# Patient Record
Sex: MALE | Race: WHITE | NOT HISPANIC OR LATINO | Employment: FULL TIME | URBAN - METROPOLITAN AREA
[De-identification: names, ages, dates, MRNs, and addresses within clinical notes are randomized per-mention and may not be internally consistent; named-entity substitution may affect disease eponyms.]

---

## 2017-01-31 ENCOUNTER — ANTICOAGULATION VISIT (OUTPATIENT)
Dept: VASCULAR LAB | Facility: MEDICAL CENTER | Age: 47
End: 2017-01-31
Attending: NURSE PRACTITIONER
Payer: COMMERCIAL

## 2017-01-31 DIAGNOSIS — D68.51 FACTOR V LEIDEN (HCC): ICD-10-CM

## 2017-01-31 LAB
INR BLD: 3.3 (ref 0.9–1.2)
INR PPP: 3.3 (ref 2–3.5)

## 2017-01-31 PROCEDURE — 99212 OFFICE O/P EST SF 10 MIN: CPT

## 2017-01-31 PROCEDURE — 85610 PROTHROMBIN TIME: CPT

## 2017-01-31 NOTE — MR AVS SNAPSHOT
José Macarioslime   2017 4:15 PM   Anticoagulation Visit   MRN: [id number]    Department:  Vascular Medicine   Dept Phone:  [phone]    Description:  Male : 1970   Provider:  [facility] EXAM 4           Allergies as of 2017     Allergen Noted Reactions    Yellow Dye 2015         You were diagnosed with     Factor V Leiden (CMS-HCC)   [512349]         Vital Signs     Smoking Status                   Never Smoker            Basic Information     Date Of Birth Sex Race Ethnicity Preferred Language    1970 Male White Non- English      Your appointments     2017  4:00 PM   Established Patient with [facility] EXAM 5   [facility]  (--)    [address] [facility] [address] [phone]              Problem List              ICD-10-CM Priority Class Noted - Resolved    Factor V Leiden (CMS-HCC) D68.51   2015 - Present    Chronic anticoagulation Z79.01   2015 - Present    Gastroesophageal reflux disease without esophagitis K21.9   2015 - Present    Type 2 diabetes mellitus without complication (CMS-HCC) E11.9   2015 - Present    Cervicalgia M54.2   2015 - Present    Tinnitus H93.19   2015 - Present      Health Maintenance        Date Due Completion Dates    IMM HEP B VACCINE (1 of 3 - Primary Series) 1970 ---    DIABETES MONOFILAMENT / LE EXAM 3/19/1970 ---    IMM DTaP/Tdap/Td Vaccine (1 - Tdap) 1988 ---    IMM PNEUMOCOCCAL 19-64 (ADULT) MEDIUM RISK SERIES (1 of 1 - PPSV23) 1988 ---    A1C SCREENING 2016, 2016, 10/10/2015    FASTING LIPID PROFILE 2017, 2016, 10/10/2015    URINE ACR / MICROALBUMIN 2017, 10/10/2015    SERUM CREATININE 2017, 2016, 2015, 10/10/2015    RETINAL SCREENING 2017            Results     POCT Protime      Component    INR    3.3                           Current Immunizations     Influenza Vaccine Quad Inj (Preserved) 10/1/2016      Below and/or attached are the medications your provider expects you to take. Review all of your home medications and newly ordered medications with your provider and/or pharmacist. Follow medication instructions as directed by your provider and/or pharmacist. Please keep your medication list with you and share with your provider. Update the information when medications are discontinued, doses are changed, or new medications (including over-the-counter products) are added; and carry medication information at all times in the event of emergency situations     Allergies:  YELLOW DYE - (reactions not documented)               Medications  Valid as of: January 31, 2017 -  4:22 PM    Generic Name Brand Name Tablet Size Instructions for use    Ascorbic Acid (Tab) ascorbic acid 500 MG Take 1,000 mg by mouth every day.        Biotin   Take 10,000 mg by mouth.        MetFORMIN HCl (Tab) GLUCOPHAGE 1000 MG Take 1 Tab by mouth 2 times a day, with meals. Indications: Type 2 Diabetes        Omeprazole (CAPSULE DELAYED RELEASE) PRILOSEC 20 MG Take 1 Cap by mouth every day.        Warfarin Sodium (Tab) COUMADIN 5 MG Take 1 Tab by mouth every day.        Warfarin Sodium (Tab) COUMADIN 5 MG Take 1 Tab by mouth every day.        .                 Medicines prescribed today were sent to:     [facility] [address] [place] [address] (), [place] - [id number] () [place] [address]    Phone: [phone] Fax: [phone]    Open 24 Hours?: No      Medication refill instructions:       If your prescription bottle indicates you have medication refills left, it is not necessary to call your provider’s office. Please contact your pharmacy and they will refill your medication.    If your prescription bottle indicates you do not have any refills left, you may request refills at any time through one of the following ways: The online [employer] system  (except Urgent Care), by calling your provider’s office, or by asking your pharmacy to contact your provider’s office with a refill request. Medication refills are processed only during regular business hours and may not be available until the next business day. Your provider may request additional information or to have a follow-up visit with you prior to refilling your medication.   *Please Note: Medication refills are assigned a new Rx number when refilled electronically. Your pharmacy may indicate that no refills were authorized even though a new prescription for the same medication is available at the pharmacy. Please request the medicine by name with the pharmacy before contacting your provider for a refill.        Warfarin Dosing Calendar   January 2017 Details    Sun Mon Tue Wed Thu Fri Sat     1               2               3               4               5               6               7                 8               9               10               11               12               13               14                 15               16               17               18               19               20               21                 22               23               24               25               26               27               28                 29               30               31   3.3   5 mg   See details           Date Details   01/31 This INR check   INR: 3.3               How to take your warfarin dose     To take:  5 mg Take 1 of the 5 mg tablets.           Warfarin Dosing Calendar   February 2017 Details    Sun Mon Tue Wed Thu Fri Sat        1      7.5 mg         2      7.5 mg         3      5 mg         4      7.5 mg           5      7.5 mg         6      5 mg         7      7.5 mg         8      7.5 mg         9      7.5 mg         10      5 mg         11      7.5 mg           12      7.5 mg         13      5 mg         14      7.5 mg         15               16               17                  18                 19               20               21               22               23               24               25                 26               27               28                    Date Details   No additional details    Date of next INR:  2/14/2017         How to take your warfarin dose     To take:  5 mg Take 1 of the 5 mg tablets.    To take:  7.5 mg Take 1.5 of the 5 mg tablets.              Shareight Access Code: Activation code not generated  Current Shareight Status: Active

## 2017-02-01 NOTE — PROGRESS NOTES
OP Anticoagulation Service Note    Date: 1/31/2017    Anticoagulation Summary as of 1/31/2017     INR goal 2.0-3.0   Selected INR 3.3! (1/31/2017)   Maintenance plan 5 mg (5 mg x 1) on Mon, Fri; 7.5 mg (5 mg x 1.5) all other days   Weekly total 47.5 mg   Plan last modified Lilian Moreno, A.P.N. (10/4/2016)   Next INR check 2/14/2017   Target end date Indefinite    Indications   Pulmonary embolism (CMS-[facility]) (Resolved) [I26.99]  Factor V Leiden (CMS-HCC) [D68.51]         Anticoagulation Episode Summary     [facility] check location [facility]    Preferred lab     Send INR reminders to     Comments       Anticoagulation Care Providers     Provider Role Specialty Phone number    Edmundo Rao M.D. Referring Family Medicine [phone]    [facility] Responsible  [phone]    Lilian Moreno A.P.N. Responsible Cardiology [phone]        Anticoagulation Patient Findings   Negatives Missed Doses, Extra Doses, Medication Changes, Antibiotic Use, Diet Changes, Dental/Other Procedures, Hospitalization, Bleeding Gums, Nose Bleeds, Blood in Urine, Blood in Stool, Any Bruising, Other Complaints          Plan:  INR is high today. Patient has not followed up with our clinic d/t cost but now has insurance which cover our services. Confirmed dosing regimen. No missed or extra doses taken. Patient denies sign/symptoms of bleeding/clotting. No recent medication change. He would like to start eating more green vegetables including juicing with green vegetables and smoothies with kale and spinach. Instructed pt to be consistent. Instructed pt to call clinic with any concerns of bleeding or thrombosis. Instructed pt to take 5 mg tonight only then resume usual regimen. Follow up in 2 weeks        Laura Calles, Pharm D

## 2017-02-14 ENCOUNTER — ANTICOAGULATION VISIT (OUTPATIENT)
Dept: VASCULAR LAB | Facility: MEDICAL CENTER | Age: 47
End: 2017-02-14
Attending: INTERNAL MEDICINE
Payer: COMMERCIAL

## 2017-02-14 DIAGNOSIS — D68.51 FACTOR V LEIDEN (HCC): ICD-10-CM

## 2017-02-14 LAB — INR PPP: 3.5 (ref 2–3.5)

## 2017-02-14 PROCEDURE — 99212 OFFICE O/P EST SF 10 MIN: CPT

## 2017-02-14 PROCEDURE — 85610 PROTHROMBIN TIME: CPT

## 2017-02-14 NOTE — MR AVS SNAPSHOT
José Macarioslime   2017 4:00 PM   Anticoagulation Visit   MRN: [id number]    Department:  Vascular Medicine   Dept Phone:  [phone]    Description:  Male : 1970   Provider:  [facility] EXAM 5           Allergies as of 2017     Allergen Noted Reactions    Yellow Dye 2015         You were diagnosed with     Factor V Leiden (CMS-HCC)   [721373]         Vital Signs     Smoking Status                   Never Smoker            Basic Information     Date Of Birth Sex Race Ethnicity Preferred Language    1970 Male White Non- English      Your appointments     Mar 01, 2017  4:15 PM   Established Patient with [facility] EXAM 1   [facility]  (--)    [address] [facility] [address] [phone]              Problem List              ICD-10-CM Priority Class Noted - Resolved    Factor V Leiden (CMS-HCC) D68.51   2015 - Present    Chronic anticoagulation Z79.01   2015 - Present    Gastroesophageal reflux disease without esophagitis K21.9   2015 - Present    Type 2 diabetes mellitus without complication (CMS-HCC) E11.9   2015 - Present    Cervicalgia M54.2   2015 - Present    Tinnitus H93.19   2015 - Present      Health Maintenance        Date Due Completion Dates    IMM HEP B VACCINE (1 of 3 - Primary Series) 1969 ---    DIABETES MONOFILAMENT / LE EXAM 3/19/1970 ---    IMM DTaP/Tdap/Td Vaccine (1 - Tdap) 1988 ---    IMM PNEUMOCOCCAL 19-64 (ADULT) MEDIUM RISK SERIES (1 of 1 - PPSV23) 1988 ---    A1C SCREENING 2016, 2016, 10/10/2015    FASTING LIPID PROFILE 2017, 2016, 10/10/2015    URINE ACR / MICROALBUMIN 2017, 10/10/2015    SERUM CREATININE 2017, 2016, 2015, 10/10/2015    RETINAL SCREENING 2017            Results     POCT Protime      Component    INR    3.5                           Current Immunizations     Influenza Vaccine Quad Inj (Preserved) 10/1/2016      Below and/or attached are the medications your provider expects you to take. Review all of your home medications and newly ordered medications with your provider and/or pharmacist. Follow medication instructions as directed by your provider and/or pharmacist. Please keep your medication list with you and share with your provider. Update the information when medications are discontinued, doses are changed, or new medications (including over-the-counter products) are added; and carry medication information at all times in the event of emergency situations     Allergies:  YELLOW DYE - (reactions not documented)               Medications  Valid as of: February 14, 2017 -  4:22 PM    Generic Name Brand Name Tablet Size Instructions for use    Ascorbic Acid (Tab) ascorbic acid 500 MG Take 1,000 mg by mouth every day.        Biotin   Take 10,000 mg by mouth.        MetFORMIN HCl (Tab) GLUCOPHAGE 1000 MG Take 1 Tab by mouth 2 times a day, with meals. Indications: Type 2 Diabetes        Omeprazole (CAPSULE DELAYED RELEASE) PRILOSEC 20 MG Take 1 Cap by mouth every day.        Warfarin Sodium (Tab) COUMADIN 5 MG Take 1 Tab by mouth every day.        Warfarin Sodium (Tab) COUMADIN 5 MG Take 1 Tab by mouth every day.        .                 Medicines prescribed today were sent to:     [facility] [address] [place] [address] (), [place] - [id number] () [place] [address]    Phone: [phone] Fax: [phone]    Open 24 Hours?: No      Medication refill instructions:       If your prescription bottle indicates you have medication refills left, it is not necessary to call your provider’s office. Please contact your pharmacy and they will refill your medication.    If your prescription bottle indicates you do not have any refills left, you may request refills at any time through one of the following ways: The online [employer] system  (except Urgent Care), by calling your provider’s office, or by asking your pharmacy to contact your provider’s office with a refill request. Medication refills are processed only during regular business hours and may not be available until the next business day. Your provider may request additional information or to have a follow-up visit with you prior to refilling your medication.   *Please Note: Medication refills are assigned a new Rx number when refilled electronically. Your pharmacy may indicate that no refills were authorized even though a new prescription for the same medication is available at the pharmacy. Please request the medicine by name with the pharmacy before contacting your provider for a refill.        Warfarin Dosing Calendar   February 2017 Details    Sun Mon Tue Wed Thu Fri Sat        1               2               3               4                 5               6               7               8               9               10               11                 12               13               14   3.5   7.5 mg   See details      15      5 mg         16      7.5 mg         17      5 mg         18      7.5 mg           19      7.5 mg         20      5 mg         21      7.5 mg         22      5 mg         23      7.5 mg         24      5 mg         25      7.5 mg           26      7.5 mg         27      5 mg         28      7.5 mg              Date Details   02/14 This INR check   INR: 3.5               How to take your warfarin dose     To take:  5 mg Take 1 of the 5 mg tablets.    To take:  7.5 mg Take 1.5 of the 5 mg tablets.           Warfarin Dosing Calendar   March 2017 Details    Sun Mon Tue Wed Thu Fri Sat        1      5 mg         2      7.5 mg         3      5 mg         4      7.5 mg           5      7.5 mg         6      5 mg         7      7.5 mg         8               9               10               11                 12               13               14               15                  16               17               18                 19               20               21               22               23               24               25                 26               27               28               29               30               31                 Date Details   No additional details    Date of next INR:  3/7/2017         How to take your warfarin dose     To take:  5 mg Take 1 of the 5 mg tablets.    To take:  7.5 mg Take 1.5 of the 5 mg tablets.              [employer] Access Code: Activation code not generated  Current [employer] Status: Active

## 2017-02-15 LAB — INR BLD: 3.5 (ref 0.9–1.2)

## 2017-02-15 NOTE — PROGRESS NOTES
Anticoagulation Summary as of 2/14/2017     INR goal 2.0-3.0   Selected INR 3.5! (2/14/2017)   Maintenance plan 5 mg (5 mg x 1) on Mon, Wed, Fri; 7.5 mg (5 mg x 1.5) all other days   Weekly total 45 mg   Plan last modified Jori Pérez, PHARMD (2/14/2017)   Next INR check 3/7/2017   Target end date Indefinite    Indications   Pulmonary embolism (CMS-[facility]) (Resolved) [I26.99]  Factor V Leiden (CMS-[facility]) [D68.51]         Anticoagulation Episode Summary     [facility]R check location [facility]    Preferred lab     Send INR reminders to     Comments       Anticoagulation Care Providers     Provider Role Specialty Phone number    Edmundo Rao M.D. Referring Family Medicine [phone]    [facility] Responsible  [phone]    TITO Ash Responsible Cardiology [phone]        Anticoagulation Patient Findings      José Bray seen in clinic today  INR  supra-therapeutic.    Denies signs/symptoms of bleeding and/or thrombosis.    Denies changes to diet or medications.   Follow up appointment in 2 week(s).    Decrease weekly warfarin dose as noted    Jori Pérez, PHARMD

## 2017-02-28 ENCOUNTER — OFFICE VISIT (OUTPATIENT)
Dept: MEDICAL GROUP | Facility: MEDICAL CENTER | Age: 47
End: 2017-02-28
Payer: COMMERCIAL

## 2017-02-28 VITALS
TEMPERATURE: 98.9 F | WEIGHT: 224 LBS | RESPIRATION RATE: 16 BRPM | HEIGHT: 74 IN | SYSTOLIC BLOOD PRESSURE: 122 MMHG | HEART RATE: 77 BPM | BODY MASS INDEX: 28.75 KG/M2 | OXYGEN SATURATION: 94 % | DIASTOLIC BLOOD PRESSURE: 82 MMHG

## 2017-02-28 DIAGNOSIS — R10.9 ABDOMINAL CRAMPING: ICD-10-CM

## 2017-02-28 DIAGNOSIS — R14.0 ABDOMINAL BLOATING: ICD-10-CM

## 2017-02-28 DIAGNOSIS — H93.13 TINNITUS, BILATERAL: ICD-10-CM

## 2017-02-28 PROCEDURE — 99213 OFFICE O/P EST LOW 20 MIN: CPT | Performed by: PHYSICIAN ASSISTANT

## 2017-02-28 NOTE — MR AVS SNAPSHOT
"        José Bray   2017 3:40 PM   Office Visit   MRN: [id number]    Department:  [facility]   Dept Phone:  [phone]    Description:  Male : 1970   Provider:  Hetal De La Cruz PA-C           Reason for Visit     Otalgia Tenitis - feels pressure and then release constantly, constant ringing    GI Problem When eating/digesting feels pain in mid torso and rectal pain, some pain when having bowel movements      Allergies as of 2017     Allergen Noted Reactions    Yellow Dye 2015         You were diagnosed with     Tinnitus, bilateral   [477162]       Abdominal bloating   [023815]       Abdominal cramping   [908349]         Vital Signs     Blood Pressure Pulse Temperature Respirations Height Weight    122/82 mmHg 77 37.2 °C (98.9 °F) 16 1.88 m (6' 2\") 101.606 kg (224 lb)    Body Mass Index Oxygen Saturation Smoking Status             28.75 kg/m2 94% Never Smoker          Basic Information     Date Of Birth Sex Race Ethnicity Preferred Language    1970 Male White Non- English      Your appointments     Mar 01, 2017  4:15 PM   Established Patient with [facility] EXAM 1   [facility]  (--)    [address] [place] [address]   [phone]              Problem List              ICD-10-CM Priority Class Noted - Resolved    Factor V Leiden (CMS-HCC) D68.51   2015 - Present    Chronic anticoagulation Z79.01   2015 - Present    Gastroesophageal reflux disease without esophagitis K21.9   2015 - Present    Type 2 diabetes mellitus without complication (CMS-HCC) E11.9   2015 - Present    Cervicalgia M54.2   2015 - Present    Tinnitus H93.19   2015 - Present      Health Maintenance        Date Due Completion Dates    IMM HEP B VACCINE (1 of 3 - Primary Series) 1970 ---    DIABETES MONOFILAMENT / LE EXAM 3/19/1970 ---    IMM DTaP/Tdap/Td Vaccine (1 - Tdap) 1988 ---    IMM PNEUMOCOCCAL " 19-64 (ADULT) MEDIUM RISK SERIES (1 of 1 - PPSV23) 9/19/1988 ---    A1C SCREENING 5/28/2017 11/28/2016, 5/13/2016, 10/10/2015    FASTING LIPID PROFILE 11/28/2017 11/28/2016, 5/13/2016, 10/10/2015    URINE ACR / MICROALBUMIN 11/28/2017 11/28/2016, 10/10/2015    SERUM CREATININE 11/28/2017 11/28/2016, 5/13/2016, 12/14/2015, 10/10/2015    RETINAL SCREENING 12/6/2017 12/6/2016            Current Immunizations     Influenza Vaccine Quad Inj (Preserved) 10/1/2016      Below and/or attached are the medications your provider expects you to take. Review all of your home medications and newly ordered medications with your provider and/or pharmacist. Follow medication instructions as directed by your provider and/or pharmacist. Please keep your medication list with you and share with your provider. Update the information when medications are discontinued, doses are changed, or new medications (including over-the-counter products) are added; and carry medication information at all times in the event of emergency situations     Allergies:  YELLOW DYE - (reactions not documented)               Medications  Valid as of: February 28, 2017 -  4:35 PM    Generic Name Brand Name Tablet Size Instructions for use    Ascorbic Acid (Tab) ascorbic acid 500 MG Take 1,000 mg by mouth every day.        B Complex Vitamins   Take  by mouth.        Biotin   Take 10,000 mg by mouth.        MetFORMIN HCl (Tab) GLUCOPHAGE 1000 MG Take 1 Tab by mouth 2 times a day, with meals. Indications: Type 2 Diabetes        Omeprazole (CAPSULE DELAYED RELEASE) PRILOSEC 20 MG Take 1 Cap by mouth every day.        Warfarin Sodium (Tab) COUMADIN 5 MG Take 1 Tab by mouth every day.        Warfarin Sodium (Tab) COUMADIN 5 MG Take 1 Tab by mouth every day.        .                 Medicines prescribed today were sent to:     [facility] [place] (), [place] - [address]    [address] [place] () [place] [address]    Phone: [phone] Fax:  [phone]    Open 24 Hours?: No      Medication refill instructions:       If your prescription bottle indicates you have medication refills left, it is not necessary to call your provider’s office. Please contact your pharmacy and they will refill your medication.    If your prescription bottle indicates you do not have any refills left, you may request refills at any time through one of the following ways: The online [employer] system (except Urgent Care), by calling your provider’s office, or by asking your pharmacy to contact your provider’s office with a refill request. Medication refills are processed only during regular business hours and may not be available until the next business day. Your provider may request additional information or to have a follow-up visit with you prior to refilling your medication.   *Please Note: Medication refills are assigned a new Rx number when refilled electronically. Your pharmacy may indicate that no refills were authorized even though a new prescription for the same medication is available at the pharmacy. Please request the medicine by name with the pharmacy before contacting your provider for a refill.        Referral     A referral request has been sent to our patient care coordination department. Please allow 3-5 business days for us to process this request and contact you either by phone or mail. If you do not hear from us by the 5th business day, please call us at [phone].           SubtleData Access Code: Activation code not generated  Current SubtleData Status: Active

## 2017-03-01 ENCOUNTER — ANTICOAGULATION VISIT (OUTPATIENT)
Dept: VASCULAR LAB | Facility: MEDICAL CENTER | Age: 47
End: 2017-03-01
Attending: INTERNAL MEDICINE
Payer: COMMERCIAL

## 2017-03-01 DIAGNOSIS — D68.51 FACTOR V LEIDEN (HCC): ICD-10-CM

## 2017-03-01 PROBLEM — R14.0 ABDOMINAL BLOATING: Status: ACTIVE | Noted: 2017-03-01

## 2017-03-01 LAB
INR BLD: 2.4 (ref 0.9–1.2)
INR PPP: 2.4 (ref 2–3.5)

## 2017-03-01 PROCEDURE — 99211 OFF/OP EST MAY X REQ PHY/QHP: CPT

## 2017-03-01 PROCEDURE — 85610 PROTHROMBIN TIME: CPT

## 2017-03-01 NOTE — PROGRESS NOTES
Subjective:   José Bray is a 46 y.o. male here today for discussion of tinnitus and abdominal bloating    Tinnitus  Chronic tinnitus bilat, usually low frequency, sometimes high frequency. For the past few months has been having more pressure in ears and now has pain that comes and goes. No dizziness or headache. No vision change. No noticeable hearing loss. Initially diagnosed with Darius Zurita. Interested in having new evaluation.    Abdominal bloating  Chronic but worsening problems with abdominal bloating, gassiness and cramping. Especially associated with eating. Feels that he can feel food going through digestive tract and especially in the LUQ and then LLQ he will have pain. Pain is better after bowel movement. Has daily or almost daily bowel movements. No blood or change in caliber of stool. Has hx of GERD and PUD. Had upper endoscopy in 2012 that showed TRISTEN and colonoscopy in 2011 that was normal. These [place] were [place]. He is under stress and has some level of anxiety and realizes symptoms could be related to this but would also like evaluation to make sure nothing else is wrong with his GI system.         Current medicines (including changes today)  Current Outpatient Prescriptions   Medication Sig Dispense Refill   • B Complex Vitamins (B COMPLEX PO) Take  by mouth.     • metformin (GLUCOPHAGE) 1000 MG tablet Take 1 Tab by mouth 2 times a day, with meals. Indications: Type 2 Diabetes 180 Tab 3   • warfarin (COUMADIN) 5 MG Tab Take 1 Tab by mouth every day. 50 Tab 5   • BIOTIN PO Take 10,000 mg by mouth.     • warfarin (COUMADIN) 5 MG Tab Take 1 Tab by mouth every day. 150 Tab 3   • ascorbic acid (ASCORBIC ACID) 500 MG Tab Take 1,000 mg by mouth every day.     • omeprazole (PRILOSEC) 20 MG delayed-release capsule Take 1 Cap by mouth every day. 90 Cap 3     No current facility-administered medications for this visit.     He  has no past medical history on file.    ROS   No fever/chills. No weight  "change. No headache/dizziness. No focal weakness. No sore throat, nasal congestion, ear pain. No chest pain, no shortness of breath, difficulty breathing. No urinary complaint. No rash or skin lesion. No joint pain or swelling.       Objective:     Blood pressure 122/82, pulse 77, temperature 37.2 °C (98.9 °F), resp. rate 16, height 1.88 m (6' 2\"), weight 101.606 kg (224 lb), SpO2 94 %. Body mass index is 28.75 kg/(m^2).   Physical Exam:  Constitutional: WDWN, NAD  Skin: Warm, dry, good turgor, no rashes in visible areas.  ENT: bilat canals cleared, TMs normal appearance  Respiratory: Unlabored respiratory effort, lungs clear to auscultation, no wheezes, no ronchi.  Cardiovascular: Normal S1, S2, no murmur,   Abdomen: Soft, non-tender, no masses, no hepatosplenomegaly.  Psych: Alert and oriented x3, normal affect and mood.        Assessment and Plan:   The following treatment plan was discussed    1. Tinnitus, bilateral    - REFERRAL TO ENT    2. Abdominal bloating    - REFERRAL TO GASTROENTEROLOGY    3. Abdominal cramping    - REFERRAL TO GASTROENTEROLOGY      Followup: with Dr. Rao as scheduled           "

## 2017-03-01 NOTE — ASSESSMENT & PLAN NOTE
Chronic tinnitus bilat, usually low frequency, sometimes high frequency. For the past few months has been having more pressure in ears and now has pain that comes and goes. No dizziness or headache. No vision change. No noticeable hearing loss. Initially diagnosed with Darius Zurita. Interested in having new evaluation.

## 2017-03-01 NOTE — ASSESSMENT & PLAN NOTE
Chronic but worsening problems with abdominal bloating, gassiness and cramping. Especially associated with eating. Feels that he can feel food going through digestive tract and especially in the LUQ and then LLQ he will have pain. Pain is better after bowel movement. Has daily or almost daily bowel movements. No blood or change in caliber of stool. Has hx of GERD and PUD. Had upper endoscopy in 2012 that showed TRISTEN and colonoscopy in 2011 that was normal. These [place] were [place]. He is under stress and has some level of anxiety and realizes symptoms could be related to this but would also like evaluation to make sure nothing else is wrong with his GI system.

## 2017-03-01 NOTE — MR AVS SNAPSHOT
José Katarina   3/1/2017 4:15 PM   Anticoagulation Visit   MRN: [id number]    Department:  Vascular Medicine   Dept Phone:  [phone]    Description:  Male : 1970   Provider:  [facility] EXAM 1           Allergies as of 3/1/2017     Allergen Noted Reactions    Yellow Dye 2015         You were diagnosed with     Factor V Leiden (CMS-HCC)   [500218]         Vital Signs     Smoking Status                   Never Smoker            Basic Information     Date Of Birth Sex Race Ethnicity Preferred Language    1970 Male White Non- English      Your appointments     Mar 15, 2017  4:15 PM   Established Patient with [facility] EXAM 5   [facility]  (--)    [address] [facility] [address] [phone]              Problem List              ICD-10-CM Priority Class Noted - Resolved    Factor V Leiden (CMS-HCC) D68.51   2015 - Present    Chronic anticoagulation Z79.01   2015 - Present    Gastroesophageal reflux disease without esophagitis K21.9   2015 - Present    Type 2 diabetes mellitus without complication (CMS-HCC) E11.9   2015 - Present    Cervicalgia M54.2   2015 - Present    Tinnitus H93.19   2015 - Present    Abdominal bloating R14.0   3/1/2017 - Present      Health Maintenance        Date Due Completion Dates    IMM HEP B VACCINE (1 of 3 - Primary Series) 1970 ---    DIABETES MONOFILAMENT / LE EXAM 3/19/1970 ---    IMM DTaP/Tdap/Td Vaccine (1 - Tdap) 1988 ---    IMM PNEUMOCOCCAL 19-64 (ADULT) MEDIUM RISK SERIES (1 of 1 - PPSV23) 1988 ---    A1C SCREENING 2016, 2016, 10/10/2015    FASTING LIPID PROFILE 2017, 2016, 10/10/2015    URINE ACR / MICROALBUMIN 2017, 10/10/2015    SERUM CREATININE 2017, 2016, 2015, 10/10/2015    RETINAL SCREENING 2017            Results     POCT Protime     Component    INR    2.4                        Current Immunizations     Influenza Vaccine Quad Inj (Preserved) 10/1/2016      Below and/or attached are the medications your provider expects you to take. Review all of your home medications and newly ordered medications with your provider and/or pharmacist. Follow medication instructions as directed by your provider and/or pharmacist. Please keep your medication list with you and share with your provider. Update the information when medications are discontinued, doses are changed, or new medications (including over-the-counter products) are added; and carry medication information at all times in the event of emergency situations     Allergies:  YELLOW DYE - (reactions not documented)               Medications  Valid as of: March 01, 2017 -  4:30 PM    Generic Name Brand Name Tablet Size Instructions for use    Ascorbic Acid (Tab) ascorbic acid 500 MG Take 1,000 mg by mouth every day.        B Complex Vitamins   Take  by mouth.        Biotin   Take 10,000 mg by mouth.        MetFORMIN HCl (Tab) GLUCOPHAGE 1000 MG Take 1 Tab by mouth 2 times a day, with meals. Indications: Type 2 Diabetes        Omeprazole (CAPSULE DELAYED RELEASE) PRILOSEC 20 MG Take 1 Cap by mouth every day.        Warfarin Sodium (Tab) COUMADIN 5 MG Take 1 Tab by mouth every day.        Warfarin Sodium (Tab) COUMADIN 5 MG Take 1 Tab by mouth every day.        .                 Medicines prescribed today were sent to:     [facility] [address] [place] [address] (), [place] - [address]    [address] () [place] [address]    Phone: [phone] Fax: [phone]    Open 24 Hours?: No      Medication refill instructions:       If your prescription bottle indicates you have medication refills left, it is not necessary to call your provider’s office. Please contact your pharmacy and they will refill your medication.    If your prescription bottle indicates you do not have any refills left, you may  request refills at any time through one of the following ways: The online [employer] system (except Urgent Care), by calling your provider’s office, or by asking your pharmacy to contact your provider’s office with a refill request. Medication refills are processed only during regular business hours and may not be available until the next business day. Your provider may request additional information or to have a follow-up visit with you prior to refilling your medication.   *Please Note: Medication refills are assigned a new Rx number when refilled electronically. Your pharmacy may indicate that no refills were authorized even though a new prescription for the same medication is available at the pharmacy. Please request the medicine by name with the pharmacy before contacting your provider for a refill.        Warfarin Dosing Calendar   March 2017 Details    Sun Mon Tue Wed Thu Fri Sat        1   2.4   5 mg   See details      2      7.5 mg         3      5 mg         4      7.5 mg           5      7.5 mg         6      5 mg         7      7.5 mg         8      5 mg         9      7.5 mg         10      5 mg         11      7.5 mg           12      7.5 mg         13      5 mg         14      7.5 mg         15      5 mg         16               17               18                 19               20               21               22               23               24               25                 26               27               28               29               30               31                 Date Details   03/01 This INR check   INR: 2.4       Date of next INR:  3/15/2017         How to take your warfarin dose     To take:  5 mg Take 1 of the 5 mg tablets.    To take:  7.5 mg Take 1.5 of the 5 mg tablets.              [employer] Access Code: Activation code not generated  Current [employer] Status: Active

## 2017-03-02 NOTE — PROGRESS NOTES
Anticoagulation Summary as of 3/1/2017     INR goal 2.0-3.0   Selected INR 2.4 (3/1/2017)   Maintenance plan 5 mg (5 mg x 1) on Mon, Wed, Fri; 7.5 mg (5 mg x 1.5) all other days   Weekly total 45 mg   Plan last modified Jori Pérez, PHARMD (2/14/2017)   Next INR check 3/15/2017   Target end date Indefinite    Indications   Pulmonary embolism (CMS-[facility]) (Resolved) [I26.99]  Factor V Leiden (CMS-[facility]) [D68.51]         Anticoagulation Episode Summary     [facility]R check location [facility]    Preferred lab     Send INR reminders to     Comments       Anticoagulation Care Providers     Provider Role Specialty Phone number    Edmundo Rao M.D. Referring Family Medicine [phone]    [facility] Responsible  [phone]    [facility] Responsible Cardiology [phone]        Anticoagulation Patient Findings    INR is therapeutic today.Verified dosing, no missed doses. Denies any changes in medications, patient has increased eating vitamin K containing foods for at least 1 week. Denies s/s of bleeding or coagulation. Follow up in 2 weeks.    Randall Gray, STUDENT    Paul Jackson, PHARMD

## 2017-03-15 ENCOUNTER — ANTICOAGULATION VISIT (OUTPATIENT)
Dept: VASCULAR LAB | Facility: MEDICAL CENTER | Age: 47
End: 2017-03-15
Attending: INTERNAL MEDICINE
Payer: COMMERCIAL

## 2017-03-15 DIAGNOSIS — D68.51 FACTOR V LEIDEN (HCC): ICD-10-CM

## 2017-03-15 LAB — INR PPP: 1.8 (ref 2–3.5)

## 2017-03-15 PROCEDURE — 99212 OFFICE O/P EST SF 10 MIN: CPT | Performed by: NURSE PRACTITIONER

## 2017-03-15 PROCEDURE — 85610 PROTHROMBIN TIME: CPT

## 2017-03-15 NOTE — PROGRESS NOTES
Anticoagulation Summary as of 3/15/2017     INR goal 2.0-3.0   Selected INR 1.8! (3/15/2017)   Maintenance plan 5 mg (5 mg x 1) on Mon, Wed, Fri; 7.5 mg (5 mg x 1.5) all other days   Weekly total 45 mg   Plan last modified Jori Pérez, PHARMD (2/14/2017)   Next INR check 4/5/2017   Target end date Indefinite    Indications   Pulmonary embolism (CMS-[facility]) (Resolved) [I26.99]  Factor V Leiden (CMS-[facility]) [D68.51]         Anticoagulation Episode Summary     [facility]R check location [facility]    Preferred lab     Send INR reminders to     Comments       Anticoagulation Care Providers     Provider Role Specialty Phone number    Edmundo Rao M.D. Referring Family Medicine [phone]    [facility] Responsible  [phone]    [facility] Ash Responsible Cardiology [phone]        Anticoagulation Patient Findings    Pt is subtherapeutic today. Denies any changes in medications.  Pt has increased his green vegetable intake with green smoothies.  He is not sure if he will be continuing this so will wait for next appt to increase his dose. Med rec completed and reviewed. Patient denies any s/sx of bleeding or clotting. Will increase tonight's dose to 7.5mg then continue dosing as outlined in calendar. Will follow-up with pt in 4 weeks.    Destiney VELIZ

## 2017-03-15 NOTE — MR AVS SNAPSHOT
José Katarina   3/15/2017 4:15 PM   Anticoagulation Visit   MRN: [id number]    Department:  Vascular Medicine   Dept Phone:  [phone]    Description:  Male : 1970   Provider:  [facility] EXAM 5           Allergies as of 3/15/2017     Allergen Noted Reactions    Yellow Dye 2015         You were diagnosed with     Factor V Leiden (CMS-HCC)   [751874]         Vital Signs     Smoking Status                   Never Smoker            Basic Information     Date Of Birth Sex Race Ethnicity Preferred Language    1970 Male White Non- English      Your appointments     2017  4:15 PM   Established Patient with [facility] EXAM 5   [facility]  (--)    [address] [facility] [address] [phone]              Problem List              ICD-10-CM Priority Class Noted - Resolved    Factor V Leiden (CMS-HCC) D68.51   2015 - Present    Chronic anticoagulation Z79.01   2015 - Present    Gastroesophageal reflux disease without esophagitis K21.9   2015 - Present    Type 2 diabetes mellitus without complication (CMS-HCC) E11.9   2015 - Present    Cervicalgia M54.2   2015 - Present    Tinnitus H93.19   2015 - Present    Abdominal bloating R14.0   3/1/2017 - Present      Health Maintenance        Date Due Completion Dates    IMM HEP B VACCINE (1 of 3 - Primary Series) 1969 ---    DIABETES MONOFILAMENT / LE EXAM 3/19/1970 ---    IMM DTaP/Tdap/Td Vaccine (1 - Tdap) 1988 ---    IMM PNEUMOCOCCAL 19-64 (ADULT) MEDIUM RISK SERIES (1 of 1 - PPSV23) 1988 ---    A1C SCREENING 2016, 2016, 10/10/2015    FASTING LIPID PROFILE 2017, 2016, 10/10/2015    URINE ACR / MICROALBUMIN 2017, 10/10/2015    SERUM CREATININE 2017, 2016, 2015, 10/10/2015    RETINAL SCREENING 2017            Results     POCT Protime     Component    INR    1.8                        Current Immunizations     Influenza Vaccine Quad Inj (Preserved) 10/1/2016      Below and/or attached are the medications your provider expects you to take. Review all of your home medications and newly ordered medications with your provider and/or pharmacist. Follow medication instructions as directed by your provider and/or pharmacist. Please keep your medication list with you and share with your provider. Update the information when medications are discontinued, doses are changed, or new medications (including over-the-counter products) are added; and carry medication information at all times in the event of emergency situations     Allergies:  YELLOW DYE - (reactions not documented)               Medications  Valid as of: March 15, 2017 -  4:25 PM    Generic Name Brand Name Tablet Size Instructions for use    Ascorbic Acid (Tab) ascorbic acid 500 MG Take 1,000 mg by mouth every day.        B Complex Vitamins   Take  by mouth.        Biotin   Take 10,000 mg by mouth.        MetFORMIN HCl (Tab) GLUCOPHAGE 1000 MG Take 1 Tab by mouth 2 times a day, with meals. Indications: Type 2 Diabetes        Omeprazole (CAPSULE DELAYED RELEASE) PRILOSEC 20 MG Take 1 Cap by mouth every day.        Warfarin Sodium (Tab) COUMADIN 5 MG Take 1 Tab by mouth every day.        Warfarin Sodium (Tab) COUMADIN 5 MG Take 1 Tab by mouth every day.        .                 Medicines prescribed today were sent to:     [facility] [address] [place] [address] (), [place] - [address]    [address] () [place] [address]    Phone: [phone] Fax: [phone]    Open 24 Hours?: No      Medication refill instructions:       If your prescription bottle indicates you have medication refills left, it is not necessary to call your provider’s office. Please contact your pharmacy and they will refill your medication.    If your prescription bottle indicates you do not have any refills left, you may  request refills at any time through one of the following ways: The online [employer] system (except Urgent Care), by calling your provider’s office, or by asking your pharmacy to contact your provider’s office with a refill request. Medication refills are processed only during regular business hours and may not be available until the next business day. Your provider may request additional information or to have a follow-up visit with you prior to refilling your medication.   *Please Note: Medication refills are assigned a new Rx number when refilled electronically. Your pharmacy may indicate that no refills were authorized even though a new prescription for the same medication is available at the pharmacy. Please request the medicine by name with the pharmacy before contacting your provider for a refill.        Warfarin Dosing Calendar   March 2017 Details    Sun Mon Tue Wed Thu Fri Sat        1               2               3               4                 5               6               7               8               9               10               11                 12               13               14               15   1.8   7.5 mg   See details      16      7.5 mg         17      5 mg         18      7.5 mg           19      7.5 mg         20      5 mg         21      7.5 mg         22      5 mg         23      7.5 mg         24      5 mg         25      7.5 mg           26      7.5 mg         27      5 mg         28      7.5 mg         29      5 mg         30      7.5 mg         31      5 mg           Date Details   03/15 This INR check   INR: 1.8               How to take your warfarin dose     To take:  5 mg Take 1 of the 5 mg tablets.    To take:  7.5 mg Take 1.5 of the 5 mg tablets.           Warfarin Dosing Calendar   April 2017 Details    Sun Mon Tue Wed Thu Fri Sat           1      7.5 mg           2      7.5 mg         3      5 mg         4      7.5 mg         5      5 mg         6               7                 8                 9               10               11               12               13               14               15                 16               17               18               19               20               21               22                 23               24               25               26               27               28               29                 30                      Date Details   No additional details    Date of next INR:  4/5/2017         How to take your warfarin dose     To take:  5 mg Take 1 of the 5 mg tablets.    To take:  7.5 mg Take 1.5 of the 5 mg tablets.              [employer] Access Code: Activation code not generated  Current [employer] Status: Active

## 2017-04-05 ENCOUNTER — ANTICOAGULATION VISIT (OUTPATIENT)
Dept: VASCULAR LAB | Facility: MEDICAL CENTER | Age: 47
End: 2017-04-05
Attending: INTERNAL MEDICINE
Payer: COMMERCIAL

## 2017-04-05 DIAGNOSIS — D68.51 FACTOR V LEIDEN (HCC): ICD-10-CM

## 2017-04-05 LAB — INR PPP: 2.2 (ref 2–3.5)

## 2017-04-05 PROCEDURE — 99211 OFF/OP EST MAY X REQ PHY/QHP: CPT | Performed by: NURSE PRACTITIONER

## 2017-04-05 PROCEDURE — 85610 PROTHROMBIN TIME: CPT

## 2017-04-05 NOTE — MR AVS SNAPSHOT
José Katarina   2017 4:15 PM   Anticoagulation Visit   MRN: [id number]    Department:  Vascular Medicine   Dept Phone:  [phone]    Description:  Male : 1970   Provider:  [facility] EXAM 5           Allergies as of 2017     Allergen Noted Reactions    Yellow Dye 2015         You were diagnosed with     Factor V Leiden (CMS-HCC)   [314053]         Vital Signs     Smoking Status                   Never Smoker            Basic Information     Date Of Birth Sex Race Ethnicity Preferred Language    1970 Male White Non- English      Your appointments     May 03, 2017  4:15 PM   Established Patient with [facility] EXAM 4   [facility]  (--)    [address] [facility] [address] [phone]              Problem List              ICD-10-CM Priority Class Noted - Resolved    Factor V Leiden (CMS-HCC) D68.51   2015 - Present    Chronic anticoagulation Z79.01   2015 - Present    Gastroesophageal reflux disease without esophagitis K21.9   2015 - Present    Type 2 diabetes mellitus without complication (CMS-HCC) E11.9   2015 - Present    Cervicalgia M54.2   2015 - Present    Tinnitus H93.19   2015 - Present    Abdominal bloating R14.0   3/1/2017 - Present      Health Maintenance        Date Due Completion Dates    IMM HEP B VACCINE (1 of 3 - Primary Series) 1970 ---    DIABETES MONOFILAMENT / LE EXAM 3/19/1970 ---    IMM DTaP/Tdap/Td Vaccine (1 - Tdap) 1988 ---    IMM PNEUMOCOCCAL 19-64 (ADULT) MEDIUM RISK SERIES (1 of 1 - PPSV23) 1988 ---    A1C SCREENING 2016, 2016, 10/10/2015    FASTING LIPID PROFILE 2017, 2016, 10/10/2015    URINE ACR / MICROALBUMIN 2017, 10/10/2015    SERUM CREATININE 2017, 2016, 2015, 10/10/2015    RETINAL SCREENING 2017            Results     POCT Protime     Component    INR    2.2                        Current Immunizations     Influenza Vaccine Quad Inj (Preserved) 10/1/2016      Below and/or attached are the medications your provider expects you to take. Review all of your home medications and newly ordered medications with your provider and/or pharmacist. Follow medication instructions as directed by your provider and/or pharmacist. Please keep your medication list with you and share with your provider. Update the information when medications are discontinued, doses are changed, or new medications (including over-the-counter products) are added; and carry medication information at all times in the event of emergency situations     Allergies:  YELLOW DYE - (reactions not documented)               Medications  Valid as of: April 05, 2017 -  4:21 PM    Generic Name Brand Name Tablet Size Instructions for use    Ascorbic Acid (Tab) ascorbic acid 500 MG Take 1,000 mg by mouth every day.        B Complex Vitamins   Take  by mouth.        Biotin   Take 10,000 mg by mouth.        MetFORMIN HCl (Tab) GLUCOPHAGE 1000 MG Take 1 Tab by mouth 2 times a day, with meals. Indications: Type 2 Diabetes        Omeprazole (CAPSULE DELAYED RELEASE) PRILOSEC 20 MG Take 1 Cap by mouth every day.        Warfarin Sodium (Tab) COUMADIN 5 MG Take 1 Tab by mouth every day.        Warfarin Sodium (Tab) COUMADIN 5 MG Take 1 Tab by mouth every day.        .                 Medicines prescribed today were sent to:     [facility] [address] [place] [address] (), [place] - [address]    [address] () [place] [address]    Phone: [phone] Fax: [phone]    Open 24 Hours?: No      Medication refill instructions:       If your prescription bottle indicates you have medication refills left, it is not necessary to call your provider’s office. Please contact your pharmacy and they will refill your medication.    If your prescription bottle indicates you do not have any refills left, you may  request refills at any time through one of the following ways: The online Com[employer] Line system (except Urgent Care), by calling your provider’s office, or by asking your pharmacy to contact your provider’s office with a refill request. Medication refills are processed only during regular business hours and may not be available until the next business day. Your provider may request additional information or to have a follow-up visit with you prior to refilling your medication.   *Please Note: Medication refills are assigned a new Rx number when refilled electronically. Your pharmacy may indicate that no refills were authorized even though a new prescription for the same medication is available at the pharmacy. Please request the medicine by name with the pharmacy before contacting your provider for a refill.        Warfarin Dosing Calendar   April 2017 Details    Sun Mon Tue Wed Thu Fri Sat           1                 2               3               4               5   2.2   5 mg   See details      6      7.5 mg         7      5 mg         8      7.5 mg           9      7.5 mg         10      5 mg         11      7.5 mg         12      5 mg         13      7.5 mg         14      5 mg         15      7.5 mg           16      7.5 mg         17      5 mg         18      7.5 mg         19      5 mg         20      7.5 mg         21      5 mg         22      7.5 mg           23      7.5 mg         24      5 mg         25      7.5 mg         26      5 mg         27      7.5 mg         28      5 mg         29      7.5 mg           30      7.5 mg                Date Details   04/05 This INR check   INR: 2.2               How to take your warfarin dose     To take:  5 mg Take 1 of the 5 mg tablets.    To take:  7.5 mg Take 1.5 of the 5 mg tablets.           Warfarin Dosing Calendar   May 2017 Details    Sun Mon Tue Wed Thu Fri Sat      1      5 mg         2      7.5 mg         3      5 mg         4               5               6                    7               8               9               10               11               12               13                 14               15               16               17               18               19               20                 21               22               23               24               25               26               27                 28               29               30               31                   Date Details   No additional details    Date of next INR:  5/3/2017         How to take your warfarin dose     To take:  5 mg Take 1 of the 5 mg tablets.    To take:  7.5 mg Take 1.5 of the 5 mg tablets.              Embedly Access Code: Activation code not generated  Current Embedly Status: Active

## 2017-04-05 NOTE — PROGRESS NOTES
Anticoagulation Summary as of 4/5/2017     INR goal 2.0-3.0   Selected INR 2.2 (4/5/2017)   Maintenance plan 5 mg (5 mg x 1) on Mon, Wed, Fri; 7.5 mg (5 mg x 1.5) all other days   Weekly total 45 mg   Plan last modified Jori Préez, PHARMD (2/14/2017)   Next INR check 5/3/2017   Target end date Indefinite    Indications   Pulmonary embolism (CMS-[facility]) (Resolved) [I26.99]  Factor V Leiden (CMS-[facility]) [D68.51]         Anticoagulation Episode Summary     [facility]R check location [facility]    Preferred lab     Send INR reminders to     Comments       Anticoagulation Care Providers     Provider Role Specialty Phone number    Edmundo Rao M.D. Referring Family Medicine [phone]    [facility] Responsible  [phone]    TITO Ash Responsible Cardiology [phone]        Patient is therapeutic today. Denies any medication or diet changes. No current symptoms of bleeding or thrombosis reported. Continue current regimen. Follow up in 4 weeks.    Next Appointment: Wednesday, May 3, 2017 at 4:15 pm.    Leatha VELIZ

## 2017-04-07 LAB — INR BLD: 2.2 (ref 0.9–1.2)

## 2017-05-04 ENCOUNTER — PATIENT MESSAGE (OUTPATIENT)
Dept: MEDICAL GROUP | Facility: CLINIC | Age: 47
End: 2017-05-04

## 2017-05-04 DIAGNOSIS — E11.9 TYPE 2 DIABETES MELLITUS WITHOUT COMPLICATION, UNSPECIFIED LONG TERM INSULIN USE STATUS: ICD-10-CM

## 2017-05-09 ENCOUNTER — ANTICOAGULATION VISIT (OUTPATIENT)
Dept: VASCULAR LAB | Facility: MEDICAL CENTER | Age: 47
End: 2017-05-09
Attending: INTERNAL MEDICINE
Payer: COMMERCIAL

## 2017-05-09 DIAGNOSIS — D68.51 FACTOR V LEIDEN (HCC): ICD-10-CM

## 2017-05-09 LAB — INR PPP: 1.7 (ref 2–3.5)

## 2017-05-09 PROCEDURE — 99212 OFFICE O/P EST SF 10 MIN: CPT

## 2017-05-09 PROCEDURE — 85610 PROTHROMBIN TIME: CPT

## 2017-05-09 NOTE — PROGRESS NOTES
Anticoagulation Summary as of 5/9/2017     INR goal 2.0-3.0   Selected INR 1.7! (5/9/2017)   Maintenance plan 5 mg (5 mg x 1) on Mon, Wed, Fri; 7.5 mg (5 mg x 1.5) all other days   Weekly total 45 mg   Plan last modified Jori Pérez, PHARMD (2/14/2017)   Next INR check 5/23/2017   Target end date Indefinite    Indications   Pulmonary embolism (CMS-[facility]) (Resolved) [I26.99]  Factor V Leiden (CMS-[facility]) [D68.51]         Anticoagulation Episode Summary     [facility]R check location [facility]    Preferred lab     Send INR reminders to     Comments       Anticoagulation Care Providers     Provider Role Specialty Phone number    Edmundo Rao M.D. Referring Family Medicine [phone]    [facility] Responsible  [phone]    TITO Ash Responsible Cardiology [phone]        Anticoagulation Patient Findings      José Bray seen in clinic today  INR  sub-therapeutic.    Denies signs/symptoms of bleeding and/or thrombosis.    Denies changes to diet or medications.   Follow up appointment in 2 week(s).      10mg today then continue weekly warfarin dose as noted    Jori Pérez, PHARMD

## 2017-05-09 NOTE — MR AVS SNAPSHOT
José Bray   2017 1:45 PM   Anticoagulation Visit   MRN: [id number]    Department:  Vascular Medicine   Dept Phone:  [phone]    Description:  Male : 1970   Provider:  [facility] EXAM 4           Allergies as of 2017     Allergen Noted Reactions    Yellow Dye 2015         You were diagnosed with     Factor V Leiden (CMS-HCC)   [605703]         Vital Signs     Smoking Status                   Never Smoker            Basic Information     Date Of Birth Sex Race Ethnicity Preferred Language    1970 Male White Non- English      Your appointments     May 09, 2017  1:45 PM   Established Patient with [facility] EXAM 4   [facility]  (--)    [address] [facility] [id number] [phone]            May 16, 2017  7:45 AM   Adult Draw/Collection with LAB [facility]   [facility] (--)    [address]  [facility] [address] [phone]            May 23, 2017  2:00 PM   Established Patient with [facility] EXAM 5   [facility]  (--)    [address] [facility] [address]   [phone]            2017  4:20 PM   Established Patient with Edmundo Rao M.D.   [facility] (--)    [address]  [address]  [facility] [id number] [phone]           You will be receiving a confirmation call a few days before your appointment from our automated call confirmation system.              Problem List              ICD-10-CM Priority Class Noted - Resolved    Factor V Leiden (CMS-[facility]) D68.51   2015 - Present    Chronic anticoagulation Z79.01   2015 - Present    Gastroesophageal reflux disease without esophagitis K21.9   2015 - Present    Type 2 diabetes mellitus without complication (CMS-[facility]) E11.9   2015 - Present    Cervicalgia M54.2   2015 - Present    Tinnitus H93.19   2015 - Present    Abdominal bloating R14.0   3/1/2017 -  Present      Health Maintenance        Date Due Completion Dates    IMM HEP B VACCINE (1 of 3 - Primary Series) 1970 ---    IMM DTaP/Tdap/Td Vaccine (1 - Tdap) 9/19/1988 ---    IMM PNEUMOCOCCAL 19-64 (ADULT) MEDIUM RISK SERIES (1 of 1 - PPSV23) 9/19/1988 ---    A1C SCREENING 5/28/2017 11/28/2016, 5/13/2016, 10/10/2015    FASTING LIPID PROFILE 11/28/2017 11/28/2016, 5/13/2016, 10/10/2015    URINE ACR / MICROALBUMIN 11/28/2017 11/28/2016, 10/10/2015    SERUM CREATININE 11/28/2017 11/28/2016, 5/13/2016, 12/14/2015, 10/10/2015    RETINAL SCREENING 12/6/2017 12/6/2016    DIABETES MONOFILAMENT / LE EXAM 5/5/2018 5/5/2017            Results     POCT Protime      Component    INR    1.7                        Current Immunizations     Influenza Vaccine Quad Inj (Preserved) 10/1/2016      Below and/or attached are the medications your provider expects you to take. Review all of your home medications and newly ordered medications with your provider and/or pharmacist. Follow medication instructions as directed by your provider and/or pharmacist. Please keep your medication list with you and share with your provider. Update the information when medications are discontinued, doses are changed, or new medications (including over-the-counter products) are added; and carry medication information at all times in the event of emergency situations     Allergies:  YELLOW DYE - (reactions not documented)               Medications  Valid as of: May 09, 2017 -  1:43 PM    Generic Name Brand Name Tablet Size Instructions for use    Ascorbic Acid (Tab) ascorbic acid 500 MG Take 1,000 mg by mouth every day.        B Complex Vitamins   Take  by mouth.        Biotin   Take 10,000 mg by mouth.        MetFORMIN HCl (Tab) GLUCOPHAGE 1000 MG Take 1 Tab by mouth 2 times a day, with meals. Indications: Type 2 Diabetes        Omeprazole (CAPSULE DELAYED RELEASE) PRILOSEC 20 MG Take 1 Cap by mouth every day.        Warfarin Sodium (Tab) COUMADIN  5 MG Take 1 Tab by mouth every day.        Warfarin Sodium (Tab) COUMADIN 5 MG Take 1 Tab by mouth every day.        .                 Medicines prescribed today were sent to:     [facility] [address] [place] [address] (), [place] - [address]    [address] () [place] [address]    Phone: [phone] Fax: [phone]    Open 24 Hours?: No      Medication refill instructions:       If your prescription bottle indicates you have medication refills left, it is not necessary to call your provider’s office. Please contact your pharmacy and they will refill your medication.    If your prescription bottle indicates you do not have any refills left, you may request refills at any time through one of the following ways: The online [employer]aximm system (except Urgent Care), by calling your provider’s office, or by asking your pharmacy to contact your provider’s office with a refill request. Medication refills are processed only during regular business hours and may not be available until the next business day. Your provider may request additional information or to have a follow-up visit with you prior to refilling your medication.   *Please Note: Medication refills are assigned a new Rx number when refilled electronically. Your pharmacy may indicate that no refills were authorized even though a new prescription for the same medication is available at the pharmacy. Please request the medicine by name with the pharmacy before contacting your provider for a refill.        Warfarin Dosing Calendar   May 2017 Details    Sun Mon Tue Wed Thu Fri Sat      1               2               3               4               5               6                 7               8               9   1.7   10 mg   See details      10      5 mg         11      7.5 mg         12      5 mg         13      7.5 mg           14      7.5 mg         15      5 mg         16      7.5 mg         17      5 mg         18      7.5 mg         19      5 mg          20      7.5 mg           21      7.5 mg         22      5 mg         23      7.5 mg         24               25               26               27                 28               29               30               31                   Date Details   05/09 This INR check   INR: 1.7       Date of next INR:  5/23/2017         How to take your warfarin dose     To take:  5 mg Take 1 of the 5 mg tablets.    To take:  7.5 mg Take 1.5 of the 5 mg tablets.    To take:  10 mg Take 2 of the 5 mg tablets.              [email or web address] Access Code: Activation code not generated  Current [email or web address] Status: Active

## 2017-05-10 LAB — INR BLD: 1.7 (ref 0.9–1.2)

## 2017-05-16 ENCOUNTER — HOSPITAL ENCOUNTER (OUTPATIENT)
Dept: LAB | Facility: MEDICAL CENTER | Age: 47
End: 2017-05-16
Attending: FAMILY MEDICINE
Payer: COMMERCIAL

## 2017-05-16 DIAGNOSIS — E11.9 TYPE 2 DIABETES MELLITUS WITHOUT COMPLICATION, UNSPECIFIED LONG TERM INSULIN USE STATUS: ICD-10-CM

## 2017-05-16 LAB
ALBUMIN SERPL BCP-MCNC: 4.3 G/DL (ref 3.2–4.9)
ALBUMIN/GLOB SERPL: 1.5 G/DL
ALP SERPL-CCNC: 55 U/L (ref 30–99)
ALT SERPL-CCNC: 25 U/L (ref 2–50)
ANION GAP SERPL CALC-SCNC: 7 MMOL/L (ref 0–11.9)
AST SERPL-CCNC: 26 U/L (ref 12–45)
BILIRUB SERPL-MCNC: 1 MG/DL (ref 0.1–1.5)
BUN SERPL-MCNC: 17 MG/DL (ref 8–22)
CALCIUM SERPL-MCNC: 9.2 MG/DL (ref 8.5–10.5)
CHLORIDE SERPL-SCNC: 104 MMOL/L (ref 96–112)
CHOLEST SERPL-MCNC: 203 MG/DL (ref 100–199)
CO2 SERPL-SCNC: 26 MMOL/L (ref 20–33)
CREAT SERPL-MCNC: 0.95 MG/DL (ref 0.5–1.4)
ERYTHROCYTE [DISTWIDTH] IN BLOOD BY AUTOMATED COUNT: 41.9 FL (ref 35.9–50)
EST. AVERAGE GLUCOSE BLD GHB EST-MCNC: 169 MG/DL
GFR SERPL CREATININE-BSD FRML MDRD: >60 ML/MIN/1.73 M 2
GLOBULIN SER CALC-MCNC: 2.8 G/DL (ref 1.9–3.5)
GLUCOSE SERPL-MCNC: 174 MG/DL (ref 65–99)
HBA1C MFR BLD: 7.5 % (ref 0–5.6)
HCT VFR BLD AUTO: 46.1 % (ref 42–52)
HDLC SERPL-MCNC: 39 MG/DL
HGB BLD-MCNC: 15.8 G/DL (ref 14–18)
LDLC SERPL CALC-MCNC: 137 MG/DL
MCH RBC QN AUTO: 30.9 PG (ref 27–33)
MCHC RBC AUTO-ENTMCNC: 34.3 G/DL (ref 33.7–35.3)
MCV RBC AUTO: 90 FL (ref 81.4–97.8)
PLATELET # BLD AUTO: 205 K/UL (ref 164–446)
PMV BLD AUTO: 11.9 FL (ref 9–12.9)
POTASSIUM SERPL-SCNC: 4.3 MMOL/L (ref 3.6–5.5)
PROT SERPL-MCNC: 7.1 G/DL (ref 6–8.2)
RBC # BLD AUTO: 5.12 M/UL (ref 4.7–6.1)
SODIUM SERPL-SCNC: 137 MMOL/L (ref 135–145)
TRIGL SERPL-MCNC: 134 MG/DL (ref 0–149)
WBC # BLD AUTO: 4.2 K/UL (ref 4.8–10.8)

## 2017-05-16 PROCEDURE — 36415 COLL VENOUS BLD VENIPUNCTURE: CPT

## 2017-05-16 PROCEDURE — 80053 COMPREHEN METABOLIC PANEL: CPT

## 2017-05-16 PROCEDURE — 80061 LIPID PANEL: CPT

## 2017-05-16 PROCEDURE — 85027 COMPLETE CBC AUTOMATED: CPT

## 2017-05-16 PROCEDURE — 83036 HEMOGLOBIN GLYCOSYLATED A1C: CPT

## 2017-05-23 ENCOUNTER — ANTICOAGULATION VISIT (OUTPATIENT)
Dept: VASCULAR LAB | Facility: MEDICAL CENTER | Age: 47
End: 2017-05-23
Attending: INTERNAL MEDICINE
Payer: COMMERCIAL

## 2017-05-23 DIAGNOSIS — D68.51 FACTOR V LEIDEN (HCC): ICD-10-CM

## 2017-05-23 LAB
INR BLD: 2 (ref 0.9–1.2)
INR PPP: 2 (ref 2–3.5)

## 2017-05-23 PROCEDURE — 99211 OFF/OP EST MAY X REQ PHY/QHP: CPT

## 2017-05-23 PROCEDURE — 85610 PROTHROMBIN TIME: CPT

## 2017-05-23 NOTE — MR AVS SNAPSHOT
José Bray   2017 2:00 PM   Anticoagulation Visit   MRN: [id number]    Department:  Vascular Medicine   Dept Phone:  [phone]    Description:  Male : 1970   Provider:  [facility] EXAM 5           Allergies as of 2017     Allergen Noted Reactions    Yellow Dye 2015         You were diagnosed with     Factor V Leiden (CMS-[facility])   [525766]         Vital Signs     Smoking Status                   Never Smoker            Basic Information     Date Of Birth Sex Race Ethnicity Preferred Language    1970 Male White Non- English      Your appointments     2017  8:00 AM   Established Patient with [facility] EXAM 4   [facility]  (--)    [address] [facility]  [place] [address] [phone]            2017  4:20 PM   Established Patient with Edmundo Rao M.D.   [facility] (--)    [address]  [address]  [place] [id number] [phone]           You will be receiving a confirmation call a few days before your appointment from our automated call confirmation system.              Problem List              ICD-10-CM Priority Class Noted - Resolved    Factor V Leiden (CMS-[facility]) D68.51   2015 - Present    Chronic anticoagulation Z79.01   2015 - Present    Gastroesophageal reflux disease without esophagitis K21.9   2015 - Present    Type 2 diabetes mellitus without complication (CMS-HCC) E11.9   2015 - Present    Cervicalgia M54.2   2015 - Present    Tinnitus H93.19   2015 - Present    Abdominal bloating R14.0   3/1/2017 - Present      Health Maintenance        Date Due Completion Dates    IMM HEP B VACCINE (1 of 3 - Primary Series) 1970 ---    IMM DTaP/Tdap/Td Vaccine (1 - Tdap) 1988 ---    IMM PNEUMOCOCCAL 19-64 (ADULT) MEDIUM RISK SERIES (1 of 1 - PPSV23) 1988 ---    A1C SCREENING 2017, 2016, 2016, 10/10/2015    URINE  ACR / MICROALBUMIN 11/28/2017 11/28/2016, 10/10/2015    RETINAL SCREENING 12/6/2017 12/6/2016    DIABETES MONOFILAMENT / LE EXAM 5/5/2018 5/5/2017    FASTING LIPID PROFILE 5/16/2018 5/16/2017, 11/28/2016, 5/13/2016, 10/10/2015    SERUM CREATININE 5/16/2018 5/16/2017, 11/28/2016, 5/13/2016, 12/14/2015, 10/10/2015            Results     POCT Protime      Component    INR    2.0                        Current Immunizations     Influenza Vaccine Quad Inj (Preserved) 10/1/2016      Below and/or attached are the medications your provider expects you to take. Review all of your home medications and newly ordered medications with your provider and/or pharmacist. Follow medication instructions as directed by your provider and/or pharmacist. Please keep your medication list with you and share with your provider. Update the information when medications are discontinued, doses are changed, or new medications (including over-the-counter products) are added; and carry medication information at all times in the event of emergency situations     Allergies:  YELLOW DYE - (reactions not documented)               Medications  Valid as of: May 23, 2017 -  2:10 PM    Generic Name Brand Name Tablet Size Instructions for use    Ascorbic Acid (Tab) ascorbic acid 500 MG Take 1,000 mg by mouth every day.        B Complex Vitamins   Take  by mouth.        Biotin   Take 10,000 mg by mouth.        MetFORMIN HCl (Tab) GLUCOPHAGE 1000 MG Take 1 Tab by mouth 2 times a day, with meals. Indications: Type 2 Diabetes        Omeprazole (CAPSULE DELAYED RELEASE) PRILOSEC 20 MG Take 1 Cap by mouth every day.        Warfarin Sodium (Tab) COUMADIN 5 MG Take 1 Tab by mouth every day.        Warfarin Sodium (Tab) COUMADIN 5 MG Take 1 Tab by mouth every day.        .                 Medicines prescribed today were sent to:     [facility] [place] (), [place] - [address]    [address] [place] () [place] [address]    Phone: [phone] Fax:  [phone]    Open 24 Hours?: No      Medication refill instructions:       If your prescription bottle indicates you have medication refills left, it is not necessary to call your provider’s office. Please contact your pharmacy and they will refill your medication.    If your prescription bottle indicates you do not have any refills left, you may request refills at any time through one of the following ways: The online [employer] system (except Urgent Care), by calling your provider’s office, or by asking your pharmacy to contact your provider’s office with a refill request. Medication refills are processed only during regular business hours and may not be available until the next business day. Your provider may request additional information or to have a follow-up visit with you prior to refilling your medication.   *Please Note: Medication refills are assigned a new Rx number when refilled electronically. Your pharmacy may indicate that no refills were authorized even though a new prescription for the same medication is available at the pharmacy. Please request the medicine by name with the pharmacy before contacting your provider for a refill.        Warfarin Dosing Calendar   May 2017 Details    Sun Mon Tue Wed Thu Fri Sat      1               2               3               4               5               6                 7               8               9               10               11               12               13                 14               15               16               17               18               19               20                 21               22               23   2.0   7.5 mg   See details      24      7.5 mg         25      7.5 mg         26      5 mg         27      7.5 mg           28      7.5 mg         29      5 mg         30      7.5 mg         31      7.5 mg             Date Details   05/23 This INR check   INR: 2.0               How to take your warfarin dose     To  take:  5 mg Take 1 of the 5 mg tablets.    To take:  7.5 mg Take 1.5 of the 5 mg tablets.           Warfarin Dosing Calendar   June 2017 Details    Sun Mon Tue Wed Thu Fri Sat         1      7.5 mg         2      5 mg         3      7.5 mg           4      7.5 mg         5      5 mg         6               7               8               9               10                 11               12               13               14               15               16               17                 18               19               20               21               22               23               24                 25               26               27               28               29               30                 Date Details   No additional details    Date of next INR:  6/5/2017         How to take your warfarin dose     To take:  5 mg Take 1 of the 5 mg tablets.    To take:  7.5 mg Take 1.5 of the 5 mg tablets.              [employer]ia Access Code: Activation code not generated  Current [employer] Status: Active

## 2017-05-23 NOTE — PROGRESS NOTES
OP Anticoagulation Service Note    Date: 5/23/2017    Anticoagulation Summary as of 5/23/2017     INR goal 2.0-3.0   Selected INR 2.0 (5/23/2017)   Maintenance plan 5 mg (5 mg x 1) on Mon, Fri; 7.5 mg (5 mg x 1.5) all other days   Weekly total 47.5 mg   Plan last modified Laura Calles PHARMD (5/23/2017)   Next INR check 6/5/2017   Target end date Indefinite    Indications   Pulmonary embolism (CMS-[facility]) (Resolved) [I26.99]  Factor V Leiden (CMS-[facility]) [D68.51]         Anticoagulation Episode Summary     [facility] check location [facility]    Preferred lab     Send INR reminders to     Comments       Anticoagulation Care Providers     Provider Role Specialty Phone number    Edmundo Rao M.D. Referring Family Medicine [phone]    [facility] Anticoagulation Services Responsible  [phone]    TITO Ash Responsible Cardiology [phone]        Anticoagulation Patient Findings   Negatives Missed Doses, Extra Doses, Medication Changes, Antibiotic Use, Diet Changes, Dental/Other Procedures, Hospitalization, Bleeding Gums, Nose Bleeds, Blood in Urine, Blood in Stool, Any Bruising, Other Complaints          Plan:  Patient is therapeutic today. Confirmed dosing regimen. Patient denies any changes in medications or diet. Patient denies any signs or symptoms of bleeding or clotting. Instructed patient to call clinic if any unusual bleeding or bruising occurs. INR is on low end of range, previously subtherapeutic, will increase weekly regimen as outlined. Will follow-up with patient in 2 week(s) prior to dental procedure. Dentist requesting INR to be < 3.0 for procedure.         Laura Calles, FARZANAD

## 2017-06-05 ENCOUNTER — ANTICOAGULATION VISIT (OUTPATIENT)
Dept: VASCULAR LAB | Facility: MEDICAL CENTER | Age: 47
End: 2017-06-05
Attending: INTERNAL MEDICINE
Payer: COMMERCIAL

## 2017-06-05 ENCOUNTER — PATIENT MESSAGE (OUTPATIENT)
Dept: MEDICAL GROUP | Facility: MEDICAL CENTER | Age: 47
End: 2017-06-05

## 2017-06-05 DIAGNOSIS — D68.51 FACTOR V LEIDEN (HCC): ICD-10-CM

## 2017-06-05 LAB
INR BLD: 1.5 (ref 0.9–1.2)
INR PPP: 1.5 (ref 2–3.5)

## 2017-06-05 PROCEDURE — 99212 OFFICE O/P EST SF 10 MIN: CPT | Performed by: NURSE PRACTITIONER

## 2017-06-05 PROCEDURE — 85610 PROTHROMBIN TIME: CPT

## 2017-06-05 NOTE — MR AVS SNAPSHOT
José Bray   2017 8:00 AM   Anticoagulation Visit   MRN: [id number]    Department:  Vascular Medicine   Dept Phone:  [phone]    Description:  Male : 1970   Provider:  [facility] EXAM 4           Allergies as of 2017     Allergen Noted Reactions    Yellow Dye 2015         You were diagnosed with     Factor V Leiden (CMS-[facility])   [753760]         Vital Signs     Smoking Status                   Never Smoker            Basic Information     Date Of Birth Sex Race Ethnicity Preferred Language    1970 Male White Non- English      Your appointments     2017  8:00 AM   Established Patient with [facility] EXAM 4   [facility]  (--)    [address]  [place] [address] [phone]            2017  4:20 PM   Established Patient with Edmundo Rao M.D.   [facility] (--)    [address]  [address]  [place] [address] [phone]           You will be receiving a confirmation call a few days before your appointment from our automated call confirmation system.              Problem List              ICD-10-CM Priority Class Noted - Resolved    Factor V Leiden (CMS-[facility]) D68.51   2015 - Present    Chronic anticoagulation Z79.01   2015 - Present    Gastroesophageal reflux disease without esophagitis K21.9   2015 - Present    Type 2 diabetes mellitus without complication (CMS-HCC) E11.9   2015 - Present    Cervicalgia M54.2   2015 - Present    Tinnitus H93.19   2015 - Present    Abdominal bloating R14.0   3/1/2017 - Present      Health Maintenance        Date Due Completion Dates    IMM HEP B VACCINE (1 of 3 - Primary Series) 1970 ---    IMM DTaP/Tdap/Td Vaccine (1 - Tdap) 1988 ---    IMM PNEUMOCOCCAL 19-64 (ADULT) MEDIUM RISK SERIES (1 of 1 - PPSV23) 1988 ---    A1C SCREENING 2017, 2016, 2016, 10/10/2015    URINE ACR  / MICROALBUMIN 11/28/2017 11/28/2016, 10/10/2015    RETINAL SCREENING 12/6/2017 12/6/2016    DIABETES MONOFILAMENT / LE EXAM 5/5/2018 5/5/2017    FASTING LIPID PROFILE 5/16/2018 5/16/2017, 11/28/2016, 5/13/2016, 10/10/2015    SERUM CREATININE 5/16/2018 5/16/2017, 11/28/2016, 5/13/2016, 12/14/2015, 10/10/2015            Results     POCT Protime      Component    INR    1.5                        Current Immunizations     Influenza Vaccine Quad Inj (Preserved) 10/1/2016      Below and/or attached are the medications your provider expects you to take. Review all of your home medications and newly ordered medications with your provider and/or pharmacist. Follow medication instructions as directed by your provider and/or pharmacist. Please keep your medication list with you and share with your provider. Update the information when medications are discontinued, doses are changed, or new medications (including over-the-counter products) are added; and carry medication information at all times in the event of emergency situations     Allergies:  YELLOW DYE - (reactions not documented)               Medications  Valid as of: June 05, 2017 -  8:20 AM    Generic Name Brand Name Tablet Size Instructions for use    Ascorbic Acid (Tab) ascorbic acid 500 MG Take 1,000 mg by mouth every day.        B Complex Vitamins   Take  by mouth.        Biotin   Take 10,000 mg by mouth.        MetFORMIN HCl (Tab) GLUCOPHAGE 1000 MG Take 1 Tab by mouth 2 times a day, with meals. Indications: Type 2 Diabetes        Omeprazole (CAPSULE DELAYED RELEASE) PRILOSEC 20 MG Take 1 Cap by mouth every day.        Warfarin Sodium (Tab) COUMADIN 5 MG Take 1 Tab by mouth every day.        Warfarin Sodium (Tab) COUMADIN 5 MG Take 1 Tab by mouth every day.        .                 Medicines prescribed today were sent to:     [facility] [address] [place] [address] (), [place] - [address]    [address] [place] () [place] [address]    Phone: [phone] Fax:  [phone]    Open 24 Hours?: No      Medication refill instructions:       If your prescription bottle indicates you have medication refills left, it is not necessary to call your provider’s office. Please contact your pharmacy and they will refill your medication.    If your prescription bottle indicates you do not have any refills left, you may request refills at any time through one of the following ways: The online [employer] system (except Urgent Care), by calling your provider’s office, or by asking your pharmacy to contact your provider’s office with a refill request. Medication refills are processed only during regular business hours and may not be available until the next business day. Your provider may request additional information or to have a follow-up visit with you prior to refilling your medication.   *Please Note: Medication refills are assigned a new Rx number when refilled electronically. Your pharmacy may indicate that no refills were authorized even though a new prescription for the same medication is available at the pharmacy. Please request the medicine by name with the pharmacy before contacting your provider for a refill.        Warfarin Dosing Calendar   June 2017 Details    Sun Mon Tue Wed Thu Fri Sat         1               2               3                 4               5   1.5   5 mg   See details      6      10 mg         7      7.5 mg         8      7.5 mg         9      5 mg         10      7.5 mg           11      7.5 mg         12      5 mg         13               14               15               16               17                 18               19               20               21               22               23               24                 25               26               27               28               29               30                 Date Details   06/05 This INR check   INR: 1.5       Date of next INR:  6/12/2017         How to take your warfarin dose     To  take:  5 mg Take 1 of the 5 mg tablets.    To take:  7.5 mg Take 1.5 of the 5 mg tablets.    To take:  10 mg Take 2 of the 5 mg tablets.              [employer] Access Code: Activation code not generated  Current [employer] Status: Active

## 2017-06-05 NOTE — PROGRESS NOTES
Anticoagulation Summary as of 6/5/2017     INR goal 2.0-3.0   Selected INR 1.5! (6/5/2017)   Maintenance plan 5 mg (5 mg x 1) on Mon, Fri; 7.5 mg (5 mg x 1.5) all other days   Weekly total 47.5 mg   Plan last modified Laura Calles, PHARMD (5/23/2017)   Next INR check 6/12/2017   Target end date Indefinite    Indications   Pulmonary embolism (CMS-HCC) (Resolved) [I26.99]  Factor V Leiden (CMS-HCC) [D68.51]         Anticoagulation Episode Summary     [facility] location [facility]    Preferred lab     Send INR reminders to     Comments       Anticoagulation Care Providers     Provider Role Specialty Phone number    Edmundo Rao M.D. Referring Family Medicine [phone]    [facility] Anticoagulation Services Responsible  [phone]    TITO Ash Responsible Cardiology [phone]        Patient is subtherapeutic today. Thinks he missed a dose on Friday. He is scheduled to have a dental procedure today and his dentist wants his INR <3.0 . Denies any medication or diet changes. No current symptoms of bleeding or thrombosis reported. Will increase two doses then continue current regimen. Follow up in 1 week.    Next Appointment: Monday, June 12, 2017 at 8:00 am.      Leatha VELIZ

## 2017-06-07 RX ORDER — ACETAZOLAMIDE 125 MG/1
125 TABLET ORAL 2 TIMES DAILY
Qty: 60 TAB | Refills: 0 | Status: SHIPPED | OUTPATIENT
Start: 2017-06-07 | End: 2017-09-27

## 2017-06-12 ENCOUNTER — ANTICOAGULATION VISIT (OUTPATIENT)
Dept: VASCULAR LAB | Facility: MEDICAL CENTER | Age: 47
End: 2017-06-12
Attending: INTERNAL MEDICINE
Payer: COMMERCIAL

## 2017-06-12 DIAGNOSIS — D68.51 FACTOR V LEIDEN (HCC): ICD-10-CM

## 2017-06-12 LAB
INR BLD: 1.9 (ref 0.9–1.2)
INR PPP: 1.9 (ref 2–3.5)

## 2017-06-12 PROCEDURE — 99212 OFFICE O/P EST SF 10 MIN: CPT

## 2017-06-12 PROCEDURE — 85610 PROTHROMBIN TIME: CPT

## 2017-06-12 NOTE — MR AVS SNAPSHOT
José Bray   2017 8:00 AM   Anticoagulation Visit   MRN: [id number]    Department:  Vascular Medicine   Dept Phone:  [phone]    Description:  Male : 1970   Provider:  [facility] EXAM 4           Allergies as of 2017     Allergen Noted Reactions    Yellow Dye 2015         You were diagnosed with     Factor V Leiden (CMS-[facility])   [132670]         Vital Signs     Smoking Status                   Never Smoker            Basic Information     Date Of Birth Sex Race Ethnicity Preferred Language    1970 Male White Non- English      Your appointments     2017  8:15 AM   Established Patient with [facility] EXAM 4   [facility]  (--)    [address]  [place] [address] [phone]            2017  4:20 PM   Established Patient with Edmundo Rao M.D.   [facility] (--)    [address]  [address]  [place] [address] [phone]           You will be receiving a confirmation call a few days before your appointment from our automated call confirmation system.              Problem List              ICD-10-CM Priority Class Noted - Resolved    Factor V Leiden (CMS-[facility]) D68.51   2015 - Present    Chronic anticoagulation Z79.01   2015 - Present    Gastroesophageal reflux disease without esophagitis K21.9   2015 - Present    Type 2 diabetes mellitus without complication (CMS-HCC) E11.9   2015 - Present    Cervicalgia M54.2   2015 - Present    Tinnitus H93.19   2015 - Present    Abdominal bloating R14.0   3/1/2017 - Present      Health Maintenance        Date Due Completion Dates    IMM HEP B VACCINE (1 of 3 - Primary Series) 1970 ---    IMM DTaP/Tdap/Td Vaccine (1 - Tdap) 1988 ---    IMM PNEUMOCOCCAL 19-64 (ADULT) MEDIUM RISK SERIES (1 of 1 - PPSV23) 1988 ---    A1C SCREENING 2017, 2016, 2016, 10/10/2015    URINE  ACR / MICROALBUMIN 11/28/2017 11/28/2016, 10/10/2015    RETINAL SCREENING 12/6/2017 12/6/2016    DIABETES MONOFILAMENT / LE EXAM 5/5/2018 5/5/2017    FASTING LIPID PROFILE 5/16/2018 5/16/2017, 11/28/2016, 5/13/2016, 10/10/2015    SERUM CREATININE 5/16/2018 5/16/2017, 11/28/2016, 5/13/2016, 12/14/2015, 10/10/2015            Results     POCT Protime      Component    INR    1.9                        Current Immunizations     Influenza Vaccine Quad Inj (Preserved) 10/1/2016      Below and/or attached are the medications your provider expects you to take. Review all of your home medications and newly ordered medications with your provider and/or pharmacist. Follow medication instructions as directed by your provider and/or pharmacist. Please keep your medication list with you and share with your provider. Update the information when medications are discontinued, doses are changed, or new medications (including over-the-counter products) are added; and carry medication information at all times in the event of emergency situations     Allergies:  YELLOW DYE - (reactions not documented)               Medications  Valid as of: June 12, 2017 -  8:27 AM    Generic Name Brand Name Tablet Size Instructions for use    AcetaZOLAMIDE (Tab) DIAMOX 125 MG Take 1 Tab by mouth 2 times a day.        Ascorbic Acid (Tab) ascorbic acid 500 MG Take 1,000 mg by mouth every day.        B Complex Vitamins   Take  by mouth.        Biotin   Take 10,000 mg by mouth.        MetFORMIN HCl (Tab) GLUCOPHAGE 1000 MG Take 1 Tab by mouth 2 times a day, with meals. Indications: Type 2 Diabetes        Omeprazole (CAPSULE DELAYED RELEASE) PRILOSEC 20 MG Take 1 Cap by mouth every day.        Warfarin Sodium (Tab) COUMADIN 5 MG Take 1 Tab by mouth every day.        Warfarin Sodium (Tab) COUMADIN 5 MG Take 1 Tab by mouth every day.        .                 Medicines prescribed today were sent to:     [facility] [address] (), [place] [address]    [address] [place] () [place] [address]    Phone: [phone] Fax: [phone]    Open 24 Hours?: No      Medication refill instructions:       If your prescription bottle indicates you have medication refills left, it is not necessary to call your provider’s office. Please contact your pharmacy and they will refill your medication.    If your prescription bottle indicates you do not have any refills left, you may request refills at any time through one of the following ways: The online [employer] system (except Urgent Care), by calling your provider’s office, or by asking your pharmacy to contact your provider’s office with a refill request. Medication refills are processed only during regular business hours and may not be available until the next business day. Your provider may request additional information or to have a follow-up visit with you prior to refilling your medication.   *Please Note: Medication refills are assigned a new Rx number when refilled electronically. Your pharmacy may indicate that no refills were authorized even though a new prescription for the same medication is available at the pharmacy. Please request the medicine by name with the pharmacy before contacting your provider for a refill.        Warfarin Dosing Calendar   June 2017 Details    Sun Mon Tue Wed Thu Fri Sat         1               2               3                 4               5               6               7               8               9               10                 11               12   1.9   7.5 mg   See details      13      7.5 mg         14      7.5 mg         15      7.5 mg         16      5 mg         17      7.5 mg           18      7.5 mg         19      5 mg         20      7.5 mg         21      7.5 mg         22      7.5 mg         23      5 mg         24      7.5 mg           25      7.5 mg         26      5 mg         27               28               29               30                 Date  Details   06/12 This INR check   INR: 1.9       Date of next INR:  6/26/2017         How to take your warfarin dose     To take:  5 mg Take 1 of the 5 mg tablets.    To take:  7.5 mg Take 1.5 of the 5 mg tablets.              [employer] Access Code: Activation code not generated  Current [employer] Status: Active

## 2017-06-12 NOTE — PROGRESS NOTES
Anticoagulation Summary as of 6/12/2017     INR goal 2.0-3.0   Selected INR 1.9! (6/12/2017)   Maintenance plan 5 mg (5 mg x 1) on Mon, Fri; 7.5 mg (5 mg x 1.5) all other days   Weekly total 47.5 mg   Plan last modified FARZANA AraujoD (5/23/2017)   Next INR check 6/26/2017   Target end date Indefinite    Indications   Pulmonary embolism (CMS-HCC) (Resolved) [I26.99]  Factor V Leiden (CMS-HCC) [D68.51]         Anticoagulation Episode Summary     [facility] location [facility]    Preferred lab     Send INR reminders to     Comments       Anticoagulation Care Providers     Provider Role Specialty Phone number    Edmundo Rao M.D. Referring Family Medicine [phone]    [facility] Anticoagulation Services Responsible  [phone]    TITO Ash Responsible Cardiology [phone]        Anticoagulation Patient Findings    INR  sub-therapeutic. Pt did not take 10mg bolus the day after subtherapeutic lab last visit as planned.   Denies signs/symptoms of bleeding and/or thrombosis.   Denies changes to diet or medications.  Pt is to take 7.5mg tonight then continue dose as outlined above.     Follow up appointment in 2 week(s) per pt preference.      Pt has a dental procedure on June 26th. Per last note - Dentist wants INR below 3.0 prior to procedure. Explained to pt we would like it under 3.0 as well so I will not make any changes to therapy at this time. Pt is coming in June 26th to check INR    /63 MmHg; HR 65    Salima Heredia, PharmD.  Pharmacy Practice Resident, PGY1

## 2017-06-26 ENCOUNTER — OFFICE VISIT (OUTPATIENT)
Dept: MEDICAL GROUP | Facility: PHYSICIAN GROUP | Age: 47
End: 2017-06-26
Payer: COMMERCIAL

## 2017-06-26 ENCOUNTER — ANTICOAGULATION VISIT (OUTPATIENT)
Dept: VASCULAR LAB | Facility: MEDICAL CENTER | Age: 47
End: 2017-06-26
Attending: INTERNAL MEDICINE
Payer: COMMERCIAL

## 2017-06-26 VITALS
WEIGHT: 217 LBS | SYSTOLIC BLOOD PRESSURE: 122 MMHG | HEART RATE: 74 BPM | RESPIRATION RATE: 20 BRPM | HEIGHT: 74 IN | OXYGEN SATURATION: 94 % | BODY MASS INDEX: 27.85 KG/M2 | TEMPERATURE: 98.1 F | DIASTOLIC BLOOD PRESSURE: 80 MMHG

## 2017-06-26 DIAGNOSIS — D68.51 FACTOR V LEIDEN (HCC): ICD-10-CM

## 2017-06-26 DIAGNOSIS — M25.471 ANKLE SWELLING, RIGHT: ICD-10-CM

## 2017-06-26 DIAGNOSIS — S93.401A SPRAIN OF RIGHT ANKLE, UNSPECIFIED LIGAMENT, INITIAL ENCOUNTER: ICD-10-CM

## 2017-06-26 DIAGNOSIS — E11.9 DIABETES MELLITUS TYPE 2, NONINSULIN DEPENDENT (HCC): ICD-10-CM

## 2017-06-26 LAB
INR BLD: 2.7 (ref 0.9–1.2)
INR PPP: 2.7 (ref 2–3.5)

## 2017-06-26 PROCEDURE — 85610 PROTHROMBIN TIME: CPT

## 2017-06-26 PROCEDURE — 99212 OFFICE O/P EST SF 10 MIN: CPT | Performed by: NURSE PRACTITIONER

## 2017-06-26 PROCEDURE — 99213 OFFICE O/P EST LOW 20 MIN: CPT | Performed by: INTERNAL MEDICINE

## 2017-06-26 RX ORDER — PREDNISONE 20 MG/1
20 TABLET ORAL DAILY
Qty: 10 TAB | Refills: 0 | Status: SHIPPED | OUTPATIENT
Start: 2017-06-26 | End: 2017-09-27

## 2017-06-26 RX ORDER — GLIPIZIDE 5 MG/1
5 TABLET ORAL 2 TIMES DAILY
Qty: 60 TAB | Refills: 6 | Status: SHIPPED | OUTPATIENT
Start: 2017-06-26 | End: 2018-01-22 | Stop reason: SDUPTHER

## 2017-06-26 NOTE — MR AVS SNAPSHOT
"        José Bray   2017 4:00 PM   Office Visit   MRN: [id number]    Department:  [facility]   Dept Phone:  [phone]    Description:  Male : 1970   Provider:  Desmond Calderon M.D.           Reason for Visit     Ankle Injury Rt ankle injury and LT ankle swollen      Allergies as of 2017     Allergen Noted Reactions    Yellow Dye 2015         You were diagnosed with     Sprain of right ankle, unspecified ligament, initial encounter   [6297135]       Ankle swelling, right   [360045]       Diabetes mellitus type 2, noninsulin dependent (CMS-HCC)   [326902]         Vital Signs     Blood Pressure Pulse Temperature Respirations Height Weight    122/80 mmHg 74 36.7 °C (98.1 °F) 20 1.88 m (6' 2.02\") 98.431 kg (217 lb)    Body Mass Index Oxygen Saturation Smoking Status             27.85 kg/m2 94% Never Smoker          Basic Information     Date Of Birth Sex Race Ethnicity Preferred Language    1970 Male White Non- English      Your appointments     2017  3:00 PM   Established Patient with Hetal De La Cruz PA-C   [facility] (--)    [address]  [address]  [place] [address] [phone]           You will be receiving a confirmation call a few days before your appointment from our automated call confirmation system.            2017  2:30 PM   DX FLUORO 30 with [facility] DX 1, RADIOLOGIST, [facility] - DIAGNOSTIC - [facility]  [address]  [place] [address] [phone]            2017  8:15 AM   Established Patient with [facility] EXAM 4   [facility] [place]  (--)    [address]  [place] [address]   [phone]              Problem List              ICD-10-CM Priority Class Noted - Resolved    Factor V Leiden (CMS-HCC) D68.51   2015 - Present    Chronic anticoagulation Z79.01   2015 - " Present    Gastroesophageal reflux disease without esophagitis K21.9   9/25/2015 - Present    Type 2 diabetes mellitus without complication (CMS-HCC) E11.9   9/25/2015 - Present    Cervicalgia M54.2   9/25/2015 - Present    Tinnitus H93.19   9/25/2015 - Present    Abdominal bloating R14.0   3/1/2017 - Present      Health Maintenance        Date Due Completion Dates    IMM HEP B VACCINE (1 of 3 - Primary Series) 1970 ---    IMM DTaP/Tdap/Td Vaccine (1 - Tdap) 9/19/1988 ---    IMM PNEUMOCOCCAL 19-64 (ADULT) MEDIUM RISK SERIES (1 of 1 - PPSV23) 9/19/1988 ---    A1C SCREENING 11/16/2017 5/16/2017, 11/28/2016, 5/13/2016, 10/10/2015    URINE ACR / MICROALBUMIN 11/28/2017 11/28/2016, 10/10/2015    RETINAL SCREENING 12/6/2017 12/6/2016    DIABETES MONOFILAMENT / LE EXAM 5/5/2018 5/5/2017    FASTING LIPID PROFILE 5/16/2018 5/16/2017, 11/28/2016, 5/13/2016, 10/10/2015    SERUM CREATININE 5/16/2018 5/16/2017, 11/28/2016, 5/13/2016, 12/14/2015, 10/10/2015            Current Immunizations     Influenza Vaccine Quad Inj (Preserved) 10/1/2016      Below and/or attached are the medications your provider expects you to take. Review all of your home medications and newly ordered medications with your provider and/or pharmacist. Follow medication instructions as directed by your provider and/or pharmacist. Please keep your medication list with you and share with your provider. Update the information when medications are discontinued, doses are changed, or new medications (including over-the-counter products) are added; and carry medication information at all times in the event of emergency situations     Allergies:  YELLOW DYE - (reactions not documented)               Medications  Valid as of: June 26, 2017 -  4:30 PM    Generic Name Brand Name Tablet Size Instructions for use    AcetaZOLAMIDE (Tab) DIAMOX 125 MG Take 1 Tab by mouth 2 times a day.        Ascorbic Acid (Tab) ascorbic acid 500 MG Take 1,000 mg by mouth every day.          B Complex Vitamins   Take  by mouth.        Biotin   Take 10,000 mg by mouth.        GlipiZIDE (Tab) GLUCOTROL 5 MG Take 1 Tab by mouth 2 times a day.        MetFORMIN HCl (Tab) GLUCOPHAGE 1000 MG Take 1 Tab by mouth 2 times a day, with meals. Indications: Type 2 Diabetes        Omeprazole (CAPSULE DELAYED RELEASE) PRILOSEC 20 MG Take 1 Cap by mouth every day.        PredniSONE (Tab) DELTASONE 20 MG Take 1 Tab by mouth every day.        Warfarin Sodium (Tab) COUMADIN 5 MG Take 1 Tab by mouth every day.        Warfarin Sodium (Tab) COUMADIN 5 MG Take 1 Tab by mouth every day.        .                 Medicines prescribed today were sent to:     [facility] [address] [place] [address] (), [place] - [address]    [address] () [place] [address]    Phone: [phone] Fax: [phone]    Open 24 Hours?: No      Medication refill instructions:       If your prescription bottle indicates you have medication refills left, it is not necessary to call your provider’s office. Please contact your pharmacy and they will refill your medication.    If your prescription bottle indicates you do not have any refills left, you may request refills at any time through one of the following ways: The online [employer] system (except Urgent Care), by calling your provider’s office, or by asking your pharmacy to contact your provider’s office with a refill request. Medication refills are processed only during regular business hours and may not be available until the next business day. Your provider may request additional information or to have a follow-up visit with you prior to refilling your medication.   *Please Note: Medication refills are assigned a new Rx number when refilled electronically. Your pharmacy may indicate that no refills were authorized even though a new prescription for the same medication is available at the pharmacy. Please request the medicine by name with the pharmacy before contacting your provider for a  refill.        Your To Do List     Future Labs/Procedures Complete By Expires    CREATININE  As directed 6/26/2018    HEMOGLOBIN A1C  As directed 6/27/2018    LIPOPROTEIN A  As directed 6/26/2018    LIPOPROTEIN A  As directed 6/26/2018    VITAMIN D,25 HYDROXY  As directed 6/26/2018         MyChart Access Code: Activation code not generated  Current [employer] Status: Active

## 2017-06-26 NOTE — MR AVS SNAPSHOT
José Bray   2017 8:15 AM   Anticoagulation Visit   MRN: [id number]    Department:  Vascular Medicine   Dept Phone:  [phone]    Description:  Male : 1970   Provider:  [facility] EXAM 4           Allergies as of 2017     Allergen Noted Reactions    Yellow Dye 2015         You were diagnosed with     Factor V Leiden (CMS-[facility])   [241139]         Vital Signs     Smoking Status                   Never Smoker            Basic Information     Date Of Birth Sex Race Ethnicity Preferred Language    1970 Male White Non- English      Your appointments     2017  4:20 PM   Established Patient with Edmundo Rao M.D.   [facility] (--)    [address]  [address]  [place] [address] [phone]           You will be receiving a confirmation call a few days before your appointment from our automated call confirmation system.            2017  8:15 AM   Established Patient with [facility] EXAM 4   [facility]  (--)    [address] [place] [address]   [phone]              Problem List              ICD-10-CM Priority Class Noted - Resolved    Factor V Leiden (CMS-[facility]) D68.51   2015 - Present    Chronic anticoagulation Z79.01   2015 - Present    Gastroesophageal reflux disease without esophagitis K21.9   2015 - Present    Type 2 diabetes mellitus without complication (CMS-[facility]) E11.9   2015 - Present    Cervicalgia M54.2   2015 - Present    Tinnitus H93.19   2015 - Present    Abdominal bloating R14.0   3/1/2017 - Present      Health Maintenance        Date Due Completion Dates    IMM HEP B VACCINE (1 of 3 - Primary Series) 1970 ---    IMM DTaP/Tdap/Td Vaccine (1 - Tdap) 1988 ---    IMM PNEUMOCOCCAL 19-64 (ADULT) MEDIUM RISK SERIES (1 of 1 - PPSV23) 1988 ---    A1C SCREENING 2017, 2016, 2016, 10/10/2015    URINE  ACR / MICROALBUMIN 11/28/2017 11/28/2016, 10/10/2015    RETINAL SCREENING 12/6/2017 12/6/2016    DIABETES MONOFILAMENT / LE EXAM 5/5/2018 5/5/2017    FASTING LIPID PROFILE 5/16/2018 5/16/2017, 11/28/2016, 5/13/2016, 10/10/2015    SERUM CREATININE 5/16/2018 5/16/2017, 11/28/2016, 5/13/2016, 12/14/2015, 10/10/2015            Results     POCT Protime      Component    INR    2.7                        Current Immunizations     Influenza Vaccine Quad Inj (Preserved) 10/1/2016      Below and/or attached are the medications your provider expects you to take. Review all of your home medications and newly ordered medications with your provider and/or pharmacist. Follow medication instructions as directed by your provider and/or pharmacist. Please keep your medication list with you and share with your provider. Update the information when medications are discontinued, doses are changed, or new medications (including over-the-counter products) are added; and carry medication information at all times in the event of emergency situations     Allergies:  YELLOW DYE - (reactions not documented)               Medications  Valid as of: June 26, 2017 -  8:16 AM    Generic Name Brand Name Tablet Size Instructions for use    AcetaZOLAMIDE (Tab) DIAMOX 125 MG Take 1 Tab by mouth 2 times a day.        Ascorbic Acid (Tab) ascorbic acid 500 MG Take 1,000 mg by mouth every day.        B Complex Vitamins   Take  by mouth.        Biotin   Take 10,000 mg by mouth.        MetFORMIN HCl (Tab) GLUCOPHAGE 1000 MG Take 1 Tab by mouth 2 times a day, with meals. Indications: Type 2 Diabetes        Omeprazole (CAPSULE DELAYED RELEASE) PRILOSEC 20 MG Take 1 Cap by mouth every day.        Warfarin Sodium (Tab) COUMADIN 5 MG Take 1 Tab by mouth every day.        Warfarin Sodium (Tab) COUMADIN 5 MG Take 1 Tab by mouth every day.        .                 Medicines prescribed today were sent to:     [facility] [address] [facility] [address] (), [place] [address]    [address] [place] () [place] [address]    Phone: [phone] Fax: [phone]    Open 24 Hours?: No      Medication refill instructions:       If your prescription bottle indicates you have medication refills left, it is not necessary to call your provider’s office. Please contact your pharmacy and they will refill your medication.    If your prescription bottle indicates you do not have any refills left, you may request refills at any time through one of the following ways: The online [employer] system (except Urgent Care), by calling your provider’s office, or by asking your pharmacy to contact your provider’s office with a refill request. Medication refills are processed only during regular business hours and may not be available until the next business day. Your provider may request additional information or to have a follow-up visit with you prior to refilling your medication.   *Please Note: Medication refills are assigned a new Rx number when refilled electronically. Your pharmacy may indicate that no refills were authorized even though a new prescription for the same medication is available at the pharmacy. Please request the medicine by name with the pharmacy before contacting your provider for a refill.        Warfarin Dosing Calendar   June 2017 Details    Sun Mon Tue Wed Thu Fri Sat         1               2               3                 4               5               6               7               8               9               10                 11               12               13               14               15               16               17                 18               19               20               21               22               23               24                 25               26   2.7   5 mg   See details      27      7.5 mg         28      7.5 mg         29      7.5 mg         30      5 mg           Date Details   06/26 This INR check   INR: 2.7               How  to take your warfarin dose     To take:  5 mg Take 1 of the 5 mg tablets.    To take:  7.5 mg Take 1.5 of the 5 mg tablets.           Warfarin Dosing Calendar   July 2017 Details    Sun Mon Tue Wed Thu Fri Sat           1      7.5 mg           2      7.5 mg         3      5 mg         4      7.5 mg         5      7.5 mg         6      7.5 mg         7      5 mg         8      7.5 mg           9      7.5 mg         10      5 mg         11      7.5 mg         12      7.5 mg         13      7.5 mg         14      5 mg         15      7.5 mg           16      7.5 mg         17      5 mg         18               19               20               21               22                 23               24               25               26               27               28               29                 30               31                     Date Details   No additional details    Date of next INR:  7/17/2017         How to take your warfarin dose     To take:  5 mg Take 1 of the 5 mg tablets.    To take:  7.5 mg Take 1.5 of the 5 mg tablets.              [employer] Access Code: Activation code not generated  Current [employer] Status: Active

## 2017-06-27 NOTE — PROGRESS NOTES
"Subjective:  José is a 46 y.o. male with the following No past medical history on file. No family history on file.  The patient is on the following medications:   Current outpatient prescriptions:   •  glipiZIDE (GLUCOTROL) 5 MG Tab, Take 1 Tab by mouth 2 times a day., Disp: 60 Tab, Rfl: 6  •  predniSONE (DELTASONE) 20 MG Tab, Take 1 Tab by mouth every day., Disp: 10 Tab, Rfl: 0  •  acetaZOLAMIDE (DIAMOX) 125 MG Tab, Take 1 Tab by mouth 2 times a day., Disp: 60 Tab, Rfl: 0  •  B Complex Vitamins (B COMPLEX PO), Take  by mouth., Disp: , Rfl:   •  metformin (GLUCOPHAGE) 1000 MG tablet, Take 1 Tab by mouth 2 times a day, with meals. Indications: Type 2 Diabetes, Disp: 180 Tab, Rfl: 3  •  warfarin (COUMADIN) 5 MG Tab, Take 1 Tab by mouth every day., Disp: 150 Tab, Rfl: 3  •  warfarin (COUMADIN) 5 MG Tab, Take 1 Tab by mouth every day., Disp: 50 Tab, Rfl: 5  •  BIOTIN PO, Take 10,000 mg by mouth., Disp: , Rfl:   •  ascorbic acid (ASCORBIC ACID) 500 MG Tab, Take 1,000 mg by mouth every day., Disp: , Rfl:   •  omeprazole (PRILOSEC) 20 MG delayed-release capsule, Take 1 Cap by mouth every day., Disp: 90 Cap, Rfl: 3    HPI; 46-year-old male patient of Dr. Rao is here today complaining of the right ankle swelling and pain after he has sprained his ankle [place] , denies having shortness of breath or chest pain.  Patient is also on metformin for diabetes, denies having polyuria or polydipsia, on warfarin for history of factor V Leiden deficiency and pulmonary embolism extremity DVT denies any bleeding..    ROS:  See HPI    Blood pressure 122/80, pulse 74, temperature 36.7 °C (98.1 °F), resp. rate 20, height 1.88 m (6' 2.02\"), weight 98.431 kg (217 lb), SpO2 94 %.on RA  Objective:  Patient is well appearing and in no acute distress.  Pharynx is clear.  Neck is soft and supple with no cervical or supraclavicular lymphadenopathy, thyromegaly or masses, no JVD.  Lungs clear to " auscultation bilaterally with normal respiratory effort. Abdomen soft, non-tender on palpation,not distended. Heart regular rate and rhythm without murmur. Extremities ; right ankle  / foot localized swelling and tenderness on palpation, no erythema or open wound.     Assessment and Plan:  1. Right ankle sprain ; per patient began several days ago while hiking. Physical exam is positive for right ankle and foot localized swelling and tenderness on palpation without erythema or open wound.       2. Diabetic mellitus; on metformin 1000 mg by mouth twice a day     Ref. Range 5/13/2016 08:07 11/28/2016 08:13 5/16/2017 08:08   Glycohemoglobin Latest Ref Range: 0.0-5.6 % 6.3 (H) 7.0 (H) 7.5 (H)         3. Factor V Leiden deficiency/ history of pulmonary embolism; currently on Coumadin, follow INR ;     Ref. Range 6/26/2017 00:00   INR Unknown 2.7       Patient is advised on preventive and supportive care regarding ankle sprain, Epsom salts soaking, short-term steroid therapy, informed that might temporarily elevated blood sugar, glipizide 5 mg by mouth twice a day, alternative therapies and monitor labs. If symptoms not improved or worsen return for evaluation.      Please note that this dictation was created using voice recognition software. I have worked with consultants from the vendor as well as technical experts from [facility] [employer] to optimize the interface. I have made every reasonable attempt to correct obvious errors, but I expect that there are errors of grammar and possibly content that I did not discover before finalizing the note.

## 2017-06-28 ENCOUNTER — OFFICE VISIT (OUTPATIENT)
Dept: MEDICAL GROUP | Facility: MEDICAL CENTER | Age: 47
End: 2017-06-28
Payer: COMMERCIAL

## 2017-06-28 ENCOUNTER — APPOINTMENT (OUTPATIENT)
Dept: RADIOLOGY | Facility: IMAGING CENTER | Age: 47
End: 2017-06-28
Attending: PHYSICIAN ASSISTANT
Payer: COMMERCIAL

## 2017-06-28 VITALS
BODY MASS INDEX: 28.06 KG/M2 | TEMPERATURE: 99.7 F | OXYGEN SATURATION: 94 % | RESPIRATION RATE: 16 BRPM | SYSTOLIC BLOOD PRESSURE: 112 MMHG | HEART RATE: 77 BPM | HEIGHT: 74 IN | WEIGHT: 218.6 LBS | DIASTOLIC BLOOD PRESSURE: 80 MMHG

## 2017-06-28 DIAGNOSIS — S99.911D RIGHT ANKLE INJURY, SUBSEQUENT ENCOUNTER: ICD-10-CM

## 2017-06-28 PROCEDURE — 73610 X-RAY EXAM OF ANKLE: CPT | Mod: TC,RT | Performed by: PHYSICIAN ASSISTANT

## 2017-06-28 PROCEDURE — 99213 OFFICE O/P EST LOW 20 MIN: CPT | Performed by: PHYSICIAN ASSISTANT

## 2017-06-28 NOTE — MR AVS SNAPSHOT
"        José Bray   2017 3:00 PM   Office Visit   MRN: [id number]    Department:  [facility]   Dept Phone:  [phone]    Description:  Male : 1970   Provider:  Hetal De La Cruz PA-C           Reason for Visit     Labs Only           Allergies as of 2017     Allergen Noted Reactions    Yellow Dye 2015         You were diagnosed with     Right ankle injury, subsequent encounter   [[id number]]         Vital Signs     Blood Pressure Pulse Temperature Respirations Height Weight    112/80 mmHg 77 37.6 °C (99.7 °F) 16 1.88 m (6' 2.02\") 99.156 kg (218 lb 9.6 oz)    Body Mass Index Oxygen Saturation Smoking Status             28.05 kg/m2 94% Never Smoker          Basic Information     Date Of Birth Sex Race Ethnicity Preferred Language    1970 Male White Non- English      Your appointments     2017 10:30 AM   Adult Draw/Collection with [facility]   [facility] (--)    [address] [place] [address]   [phone]            2017  2:30 PM   DX FLUORO 30 with [facility] DX 1, RADIOLOGIST, [facility] IMAGING - DIAGNOSTIC - [facility]  [address]  [place] [address] [phone]            2017  8:15 AM   Established Patient with [facility] EXAM 4   [facility]  (--)    [address]  [place] [address]   [phone]            Sep 27, 2017  4:00 PM   NEW TO YOU with Desmond Calderon M.D.   [facility] (--)    [address]  [address]  [place] [address] [phone]              Problem List              ICD-10-CM Priority Class Noted - Resolved    Factor V Leiden (CMS-HCC) D68.51   2015 - Present    Chronic anticoagulation Z79.01   2015 - Present    Gastroesophageal reflux disease without esophagitis K21.9   2015 - Present    Type 2 diabetes mellitus without complication (CMS-HCC) E11.9   " 9/25/2015 - Present    Cervicalgia M54.2   9/25/2015 - Present    Tinnitus H93.19   9/25/2015 - Present    Abdominal bloating R14.0   3/1/2017 - Present      Health Maintenance        Date Due Completion Dates    IMM HEP B VACCINE (1 of 3 - Primary Series) 1970 ---    IMM DTaP/Tdap/Td Vaccine (1 - Tdap) 9/19/1988 ---    IMM PNEUMOCOCCAL 19-64 (ADULT) MEDIUM RISK SERIES (1 of 1 - PPSV23) 9/19/1988 ---    A1C SCREENING 11/16/2017 5/16/2017, 11/28/2016, 5/13/2016, 10/10/2015    URINE ACR / MICROALBUMIN 11/28/2017 11/28/2016, 10/10/2015    RETINAL SCREENING 12/6/2017 12/6/2016    DIABETES MONOFILAMENT / LE EXAM 5/5/2018 5/5/2017    FASTING LIPID PROFILE 5/16/2018 5/16/2017, 11/28/2016, 5/13/2016, 10/10/2015    SERUM CREATININE 5/16/2018 5/16/2017, 11/28/2016, 5/13/2016, 12/14/2015, 10/10/2015            Current Immunizations     Influenza Vaccine Quad Inj (Preserved) 10/1/2016      Below and/or attached are the medications your provider expects you to take. Review all of your home medications and newly ordered medications with your provider and/or pharmacist. Follow medication instructions as directed by your provider and/or pharmacist. Please keep your medication list with you and share with your provider. Update the information when medications are discontinued, doses are changed, or new medications (including over-the-counter products) are added; and carry medication information at all times in the event of emergency situations     Allergies:  YELLOW DYE - (reactions not documented)               Medications  Valid as of: June 28, 2017 -  3:40 PM    Generic Name Brand Name Tablet Size Instructions for use    AcetaZOLAMIDE (Tab) DIAMOX 125 MG Take 1 Tab by mouth 2 times a day.        Ascorbic Acid (Tab) ascorbic acid 500 MG Take 1,000 mg by mouth every day.        B Complex Vitamins   Take  by mouth.        Biotin   Take 10,000 mg by mouth.        GlipiZIDE (Tab) GLUCOTROL 5 MG Take 1 Tab by mouth 2 times a day.          MetFORMIN HCl (Tab) GLUCOPHAGE 1000 MG Take 1 Tab by mouth 2 times a day, with meals. Indications: Type 2 Diabetes        Omeprazole (CAPSULE DELAYED RELEASE) PRILOSEC 20 MG Take 1 Cap by mouth every day.        PredniSONE (Tab) DELTASONE 20 MG Take 1 Tab by mouth every day.        Warfarin Sodium (Tab) COUMADIN 5 MG Take 1 Tab by mouth every day.        Warfarin Sodium (Tab) COUMADIN 5 MG Take 1 Tab by mouth every day.        .                 Medicines prescribed today were sent to:     [address] (), [place] - [address]    [address] () [place] [address]    Phone: [phone] Fax: [phone]    Open 24 Hours?: No      Medication refill instructions:       If your prescription bottle indicates you have medication refills left, it is not necessary to call your provider’s office. Please contact your pharmacy and they will refill your medication.    If your prescription bottle indicates you do not have any refills left, you may request refills at any time through one of the following ways: The online [employer] system (except Urgent Care), by calling your provider’s office, or by asking your pharmacy to contact your provider’s office with a refill request. Medication refills are processed only during regular business hours and may not be available until the next business day. Your provider may request additional information or to have a follow-up visit with you prior to refilling your medication.   *Please Note: Medication refills are assigned a new Rx number when refilled electronically. Your pharmacy may indicate that no refills were authorized even though a new prescription for the same medication is available at the pharmacy. Please request the medicine by name with the pharmacy before contacting your provider for a refill.        Your To Do List     Future Labs/Procedures Complete By Expires    DX-ANKLE 3+ VIEWS RIGHT  As directed 6/28/2018         [employer] Access Code:  Activation code not generated  Current MyChart Status: Active

## 2017-06-29 NOTE — PROGRESS NOTES
Subjective:      José Bray is a 46 y.o. male who presents with Labs Only            HPIPatient presents for lab review and right ankle pain. Establishing with Dr. Calderon. Discussed results of CBC, CMP, Lipid and HgA1C at length. Right ankle rolled about 7-10 days ago. Swollen, painful, bruising. Wearing brace. No xray done at this point. No other injury.     ROSno weight change. No fever/chills. No neck or back pain. No rash or skin lesion. No other joint pain or swelling    Active Ambulatory Problems     Diagnosis Date Noted   • Factor V Leiden (CMS-HCC) 09/25/2015   • Chronic anticoagulation 09/25/2015   • Gastroesophageal reflux disease without esophagitis 09/25/2015   • Type 2 diabetes mellitus without complication (CMS-HCC) 09/25/2015   • Cervicalgia 09/25/2015   • Tinnitus 09/25/2015   • Abdominal bloating 03/01/2017     Resolved Ambulatory Problems     Diagnosis Date Noted   • Pulmonary embolism (CMS-HCC) 09/25/2015   • Generalized abdominal pain 11/23/2015     No Additional Past Medical History     Current Outpatient Prescriptions   Medication Sig Dispense Refill   • glipiZIDE (GLUCOTROL) 5 MG Tab Take 1 Tab by mouth 2 times a day. 60 Tab 6   • predniSONE (DELTASONE) 20 MG Tab Take 1 Tab by mouth every day. 10 Tab 0   • B Complex Vitamins (B COMPLEX PO) Take  by mouth.     • metformin (GLUCOPHAGE) 1000 MG tablet Take 1 Tab by mouth 2 times a day, with meals. Indications: Type 2 Diabetes 180 Tab 3   • warfarin (COUMADIN) 5 MG Tab Take 1 Tab by mouth every day. 50 Tab 5   • BIOTIN PO Take 10,000 mg by mouth.     • acetaZOLAMIDE (DIAMOX) 125 MG Tab Take 1 Tab by mouth 2 times a day. 60 Tab 0   • warfarin (COUMADIN) 5 MG Tab Take 1 Tab by mouth every day. 150 Tab 3   • ascorbic acid (ASCORBIC ACID) 500 MG Tab Take 1,000 mg by mouth every day.     • omeprazole (PRILOSEC) 20 MG delayed-release capsule Take 1 Cap by mouth every day. 90 Cap 3     No current facility-administered medications for this visit.  "  allergy to yellow dye  Non-smoker   Objective:     /80 mmHg  Pulse 77  Temp(Src) 37.6 °C (99.7 °F)  Resp 16  Ht 1.88 m (6' 2.02\")  Wt 99.156 kg (218 lb 9.6 oz)  BMI 28.05 kg/m2  SpO2 94%     Physical Exam   Constitutional: He is oriented to person, place, and time. He appears well-developed and well-nourished. No distress.   Musculoskeletal:        Right ankle: He exhibits decreased range of motion, swelling and ecchymosis. He exhibits normal pulse. Tenderness. Lateral malleolus tenderness found. Achilles tendon normal.        Feet:    Neurological: He is alert and oriented to person, place, and time.   Skin: Skin is warm and dry. No rash noted. He is not diaphoretic. No pallor.   Psychiatric: He has a normal mood and affect. His behavior is normal. Judgment and thought content normal.   Vitals reviewed.              Assessment/Plan:     1. Right ankle injury, subsequent encounter  Cont rest, ice, elevation, brace. May need PT as pain resolves  - DX-ANKLE 3+ VIEWS RIGHT; Future        "

## 2017-07-05 ENCOUNTER — HOSPITAL ENCOUNTER (OUTPATIENT)
Dept: RADIOLOGY | Facility: MEDICAL CENTER | Age: 47
End: 2017-07-05
Attending: OTOLARYNGOLOGY
Payer: COMMERCIAL

## 2017-07-05 DIAGNOSIS — H92.03 OTALGIA OF BOTH EARS: ICD-10-CM

## 2017-07-05 DIAGNOSIS — R13.10 DYSPHAGIA, UNSPECIFIED TYPE: ICD-10-CM

## 2017-07-05 PROCEDURE — 74230 X-RAY XM SWLNG FUNCJ C+: CPT

## 2017-07-05 PROCEDURE — 92611 MOTION FLUOROSCOPY/SWALLOW: CPT

## 2017-07-17 ENCOUNTER — ANTICOAGULATION VISIT (OUTPATIENT)
Dept: VASCULAR LAB | Facility: MEDICAL CENTER | Age: 47
End: 2017-07-17
Attending: INTERNAL MEDICINE
Payer: COMMERCIAL

## 2017-07-17 DIAGNOSIS — D68.51 FACTOR V LEIDEN (HCC): ICD-10-CM

## 2017-07-17 LAB
INR BLD: 1.9 (ref 0.9–1.2)
INR PPP: 1.9 (ref 2–3.5)

## 2017-07-17 PROCEDURE — 85610 PROTHROMBIN TIME: CPT

## 2017-07-17 PROCEDURE — 99212 OFFICE O/P EST SF 10 MIN: CPT

## 2017-07-17 NOTE — PROGRESS NOTES
OP Anticoagulation Service Note    Date: 7/17/2017    Anticoagulation Summary as of 7/17/2017     INR goal 2.0-3.0   Selected INR 1.9! (7/17/2017)   Maintenance plan 5 mg (5 mg x 1) on Mon, Fri; 7.5 mg (5 mg x 1.5) all other days   Weekly total 47.5 mg   Plan last modified Laura Calles PHARMD (5/23/2017)   Next INR check 7/31/2017   Target end date Indefinite    Indications   Pulmonary embolism (CMS-[facility]) (Resolved) [I26.99]  Factor V Leiden (CMS-[facility]) [D68.51]         Anticoagulation Episode Summary     INR check location [facility]    Preferred lab     Send INR reminders to     Comments       Anticoagulation Care Providers     Provider Role Specialty Phone number    Edmundo Rao M.D. Referring Family Medicine [phone]    [facility] Responsible  [phone]    [facility] Responsible Cardiology [phone]          Plan:  INR is low today.   Confirmed dosing regimen. No missed or extra dosing taken. Patient denies sign/symptoms of bleeding/clotting. No recent medication changes. He reports juicing more frequently, he will cut back.  Instructed pt to call clinic with any concerns of bleeding or thrombosis. Instructed pt to take 7.5 mg tonight then resume usual regimen.  Follow up in 2 week(s)        Laura Calles, FARZANAD

## 2017-07-17 NOTE — MR AVS SNAPSHOT
José Bray   2017 8:15 AM   Anticoagulation Visit   MRN: [id number]    Department:  Vascular Medicine   Dept Phone:  [phone]    Description:  Male : 1970   Provider:  [facility] EXAM 4           Allergies as of 2017     Allergen Noted Reactions    Yellow Dye 2015         You were diagnosed with     Factor V Leiden (CMS-HCC)   [372189]         Vital Signs     Smoking Status                   Never Smoker            Basic Information     Date Of Birth Sex Race Ethnicity Preferred Language    1970 Male White Non- English      Your appointments     2017  8:15 AM   Established Patient with [facility] EXAM 5   [facility]  (--)    [address]  [place] [address]   [phone]            Sep 27, 2017  4:00 PM   NEW TO YOU with Desmond Calderon M.D.   [facility] [place] (--)    [address]  [address]  [place] [id number] [phone]              Problem List              ICD-10-CM Priority Class Noted - Resolved    Factor V Leiden (CMS-HCC) D68.51   2015 - Present    Chronic anticoagulation Z79.01   2015 - Present    Gastroesophageal reflux disease without esophagitis K21.9   2015 - Present    Type 2 diabetes mellitus without complication (CMS-HCC) E11.9   2015 - Present    Cervicalgia M54.2   2015 - Present    Tinnitus H93.19   2015 - Present    Abdominal bloating R14.0   3/1/2017 - Present      Health Maintenance        Date Due Completion Dates    IMM HEP B VACCINE (1 of 3 - Primary Series) 1970 ---    IMM DTaP/Tdap/Td Vaccine (1 - Tdap) 1988 ---    IMM PNEUMOCOCCAL 19-64 (ADULT) MEDIUM RISK SERIES (1 of 1 - PPSV23) 1988 ---    IMM INFLUENZA (1) 2017 10/1/2016    A1C SCREENING 2017, 2016, 2016, 10/10/2015    URINE ACR / MICROALBUMIN 2017, 10/10/2015    RETINAL SCREENING 2017    DIABETES  MONOFILAMENT / LE EXAM 5/5/2018 5/5/2017    FASTING LIPID PROFILE 5/16/2018 5/16/2017, 11/28/2016, 5/13/2016, 10/10/2015    SERUM CREATININE 5/16/2018 5/16/2017, 11/28/2016, 5/13/2016, 12/14/2015, 10/10/2015            Results     POCT Protime      Component    INR    1.9                        Current Immunizations     Influenza Vaccine Quad Inj (Preserved) 10/1/2016      Below and/or attached are the medications your provider expects you to take. Review all of your home medications and newly ordered medications with your provider and/or pharmacist. Follow medication instructions as directed by your provider and/or pharmacist. Please keep your medication list with you and share with your provider. Update the information when medications are discontinued, doses are changed, or new medications (including over-the-counter products) are added; and carry medication information at all times in the event of emergency situations     Allergies:  YELLOW DYE - (reactions not documented)               Medications  Valid as of: July 17, 2017 -  8:38 AM    Generic Name Brand Name Tablet Size Instructions for use    AcetaZOLAMIDE (Tab) DIAMOX 125 MG Take 1 Tab by mouth 2 times a day.        Ascorbic Acid (Tab) ascorbic acid 500 MG Take 1,000 mg by mouth every day.        B Complex Vitamins   Take  by mouth.        Biotin   Take 10,000 mg by mouth.        GlipiZIDE (Tab) GLUCOTROL 5 MG Take 1 Tab by mouth 2 times a day.        MetFORMIN HCl (Tab) GLUCOPHAGE 1000 MG Take 1 Tab by mouth 2 times a day, with meals. Indications: Type 2 Diabetes        Omeprazole (CAPSULE DELAYED RELEASE) PRILOSEC 20 MG Take 1 Cap by mouth every day.        PredniSONE (Tab) DELTASONE 20 MG Take 1 Tab by mouth every day.        Warfarin Sodium (Tab) COUMADIN 5 MG Take 1 Tab by mouth every day.        Warfarin Sodium (Tab) COUMADIN 5 MG Take 1 Tab by mouth every day.        .                 Medicines prescribed today were sent to:     [facility]  [address] - [place] (), [place] - [address]    [address] [place] () [place] [address]    Phone: [phone] Fax: [phone]    Open 24 Hours?: No      Medication refill instructions:       If your prescription bottle indicates you have medication refills left, it is not necessary to call your provider’s office. Please contact your pharmacy and they will refill your medication.    If your prescription bottle indicates you do not have any refills left, you may request refills at any time through one of the following ways: The online [employer] system (except Urgent Care), by calling your provider’s office, or by asking your pharmacy to contact your provider’s office with a refill request. Medication refills are processed only during regular business hours and may not be available until the next business day. Your provider may request additional information or to have a follow-up visit with you prior to refilling your medication.   *Please Note: Medication refills are assigned a new Rx number when refilled electronically. Your pharmacy may indicate that no refills were authorized even though a new prescription for the same medication is available at the pharmacy. Please request the medicine by name with the pharmacy before contacting your provider for a refill.        Warfarin Dosing Calendar   July 2017 Details    Sun Mon Tue Wed Thu Fri Sat           1                 2               3               4               5               6               7               8                 9               10               11               12               13               14               15                 16               17   1.9   7.5 mg   See details      18      7.5 mg         19      7.5 mg         20      7.5 mg         21      5 mg         22      7.5 mg           23      7.5 mg         24      5 mg         25      7.5 mg         26      7.5 mg         27      7.5 mg         28      5 mg         29      7.5 mg             30      7.5 mg         31      5 mg               Date Details   07/17 This INR check   INR: 1.9      Date of next INR: No date specified         How to take your warfarin dose     To take:  5 mg Take 1 of the 5 mg tablets.    To take:  7.5 mg Take 1.5 of the 5 mg tablets.              Dat[employer] Access Code: Activation code not generated  Current Dat[employer] Status: Active

## 2017-07-31 ENCOUNTER — ANTICOAGULATION VISIT (OUTPATIENT)
Dept: VASCULAR LAB | Facility: MEDICAL CENTER | Age: 47
End: 2017-07-31
Attending: INTERNAL MEDICINE
Payer: COMMERCIAL

## 2017-07-31 DIAGNOSIS — D68.51 FACTOR V LEIDEN (HCC): ICD-10-CM

## 2017-07-31 LAB
INR BLD: 1.7 (ref 0.9–1.2)
INR PPP: 1.7 (ref 2–3.5)

## 2017-07-31 PROCEDURE — 85610 PROTHROMBIN TIME: CPT

## 2017-07-31 PROCEDURE — 99212 OFFICE O/P EST SF 10 MIN: CPT

## 2017-07-31 NOTE — MR AVS SNAPSHOT
José Bray   2017 8:15 AM   Anticoagulation Visit   MRN: [id number]    Department:  Vascular Medicine   Dept Phone:  [phone]    Description:  Male : 1970   Provider:  [facility] EXAM 5           Allergies as of 2017     Allergen Noted Reactions    Yellow Dye 2015         You were diagnosed with     Factor V Leiden (CMS-HCC)   [921055]         Vital Signs     Smoking Status                   Never Smoker            Basic Information     Date Of Birth Sex Race Ethnicity Preferred Language    1970 Male White Non- English      Your appointments     Aug 14, 2017  8:15 AM   Established Patient with [facility] EXAM 4   [facility]  (--)    [address]  [place] [address]   [phone]            Sep 27, 2017  4:00 PM   NEW TO YOU with Desmond Calderon M.D.   [facility] [place] (--)    [address]  [address]  [place] [id number] [phone]              Problem List              ICD-10-CM Priority Class Noted - Resolved    Factor V Leiden (CMS-HCC) D68.51   2015 - Present    Chronic anticoagulation Z79.01   2015 - Present    Gastroesophageal reflux disease without esophagitis K21.9   2015 - Present    Type 2 diabetes mellitus without complication (CMS-HCC) E11.9   2015 - Present    Cervicalgia M54.2   2015 - Present    Tinnitus H93.19   2015 - Present    Abdominal bloating R14.0   3/1/2017 - Present      Health Maintenance        Date Due Completion Dates    IMM HEP B VACCINE (1 of 3 - Primary Series) 1970 ---    IMM DTaP/Tdap/Td Vaccine (1 - Tdap) 1988 ---    IMM PNEUMOCOCCAL 19-64 (ADULT) MEDIUM RISK SERIES (1 of 1 - PPSV23) 1988 ---    IMM INFLUENZA (1) 2017 10/1/2016    A1C SCREENING 2017, 2016, 2016, 10/10/2015    URINE ACR / MICROALBUMIN 2017, 10/10/2015    RETINAL SCREENING 2017    DIABETES  MONOFILAMENT / LE EXAM 5/5/2018 5/5/2017    FASTING LIPID PROFILE 5/16/2018 5/16/2017, 11/28/2016, 5/13/2016, 10/10/2015    SERUM CREATININE 5/16/2018 5/16/2017, 11/28/2016, 5/13/2016, 12/14/2015, 10/10/2015            Results     POCT Protime      Component    INR    1.7                        Current Immunizations     Influenza Vaccine Quad Inj (Preserved) 10/1/2016      Below and/or attached are the medications your provider expects you to take. Review all of your home medications and newly ordered medications with your provider and/or pharmacist. Follow medication instructions as directed by your provider and/or pharmacist. Please keep your medication list with you and share with your provider. Update the information when medications are discontinued, doses are changed, or new medications (including over-the-counter products) are added; and carry medication information at all times in the event of emergency situations     Allergies:  YELLOW DYE - (reactions not documented)               Medications  Valid as of: July 31, 2017 -  8:40 AM    Generic Name Brand Name Tablet Size Instructions for use    AcetaZOLAMIDE (Tab) DIAMOX 125 MG Take 1 Tab by mouth 2 times a day.        Ascorbic Acid (Tab) ascorbic acid 500 MG Take 1,000 mg by mouth every day.        B Complex Vitamins   Take  by mouth.        Biotin   Take 10,000 mg by mouth.        GlipiZIDE (Tab) GLUCOTROL 5 MG Take 1 Tab by mouth 2 times a day.        MetFORMIN HCl (Tab) GLUCOPHAGE 1000 MG Take 1 Tab by mouth 2 times a day, with meals. Indications: Type 2 Diabetes        Omeprazole (CAPSULE DELAYED RELEASE) PRILOSEC 20 MG Take 1 Cap by mouth every day.        PredniSONE (Tab) DELTASONE 20 MG Take 1 Tab by mouth every day.        Warfarin Sodium (Tab) COUMADIN 5 MG Take 1 Tab by mouth every day.        Warfarin Sodium (Tab) COUMADIN 5 MG Take 1 Tab by mouth every day.        .                 Medicines prescribed today were sent to:     [facility]  [address] - [place] (), [place] - [address]    [address] [place] () [place] [address]    Phone: [phone] Fax: [phone]    Open 24 Hours?: No      Medication refill instructions:       If your prescription bottle indicates you have medication refills left, it is not necessary to call your provider’s office. Please contact your pharmacy and they will refill your medication.    If your prescription bottle indicates you do not have any refills left, you may request refills at any time through one of the following ways: The online [employer] system (except Urgent Care), by calling your provider’s office, or by asking your pharmacy to contact your provider’s office with a refill request. Medication refills are processed only during regular business hours and may not be available until the next business day. Your provider may request additional information or to have a follow-up visit with you prior to refilling your medication.   *Please Note: Medication refills are assigned a new Rx number when refilled electronically. Your pharmacy may indicate that no refills were authorized even though a new prescription for the same medication is available at the pharmacy. Please request the medicine by name with the pharmacy before contacting your provider for a refill.        Warfarin Dosing Calendar   July 2017 Details    Sun Mon Tue Wed Thu Fri Sat           1                 2               3               4               5               6               7               8                 9               10               11               12               13               14               15                 16               17               18               19               20               21               22                 23               24               25               26               27               28               29                 30               31   1.7   7.5 mg   See details            Date Details   07/31  This INR check   INR: 1.7               How to take your warfarin dose     To take:  7.5 mg Take 1.5 of the 5 mg tablets.           Warfarin Dosing Calendar   August 2017 Details    Sun Mon Tue Wed Thu Fri Sat       1      7.5 mg         2      7.5 mg         3      7.5 mg         4      5 mg         5      7.5 mg           6      7.5 mg         7      7.5 mg         8      7.5 mg         9      7.5 mg         10      7.5 mg         11      5 mg         12      7.5 mg           13      7.5 mg         14      7.5 mg         15               16               17               18               19                 20               21               22               23               24               25               26                 27               28               29               30               31                  Date Details   No additional details    Date of next INR:  8/14/2017         How to take your warfarin dose     To take:  5 mg Take 1 of the 5 mg tablets.    To take:  7.5 mg Take 1.5 of the 5 mg tablets.              H[employer] Access Code: Activation code not generated  Current Hiri Status: Active

## 2017-07-31 NOTE — PROGRESS NOTES
OP Anticoagulation Service Note    Date: 7/31/2017    Anticoagulation Summary as of 7/31/2017     INR goal 2.0-3.0   Selected INR 1.7! (7/31/2017)   Maintenance plan 5 mg (5 mg x 1) on Fri; 7.5 mg (5 mg x 1.5) all other days   Weekly total 50 mg   Plan last modified Laura Calles PHARMD (7/31/2017)   Next INR check 8/14/2017   Target end date Indefinite    Indications   Pulmonary embolism (CMS-HCC) (Resolved) [I26.99]  Factor V Leiden (CMS-HCC) [D68.51]         Anticoagulation Episode Summary     [facility] check location [facility]    Preferred lab     Send INR reminders to     Comments       Anticoagulation Care Providers     Provider Role Specialty Phone number    Edmundo Rao M.D. Referring Family Medicine [phone]    [facility] Responsible  [phone]    TITO Ash Responsible Cardiology [phone]        Anticoagulation Patient Findings   Negatives Missed Doses, Extra Doses, Medication Changes, Antibiotic Use, Diet Changes, Dental/Other Procedures, Hospitalization, Bleeding Gums, Nose Bleeds, Blood in Urine, Blood in Stool, Any Bruising, Other Complaints          Plan:  INR is low today.   Confirmed dosing regimen. No missed or extra dosing taken. Patient denies sign/symptoms of bleeding/clotting. No recent medication changes.  He has been juicing every other day or so. Instructed pt to call clinic with any concerns of bleeding or thrombosis. Will have pt begin increased weekly regimen. Follow up in 2 week(s). Pt is interested in switching to NOAC, he will research some himself and will discuss further at next visit.             Laura Calles, Pharm D

## 2017-08-14 ENCOUNTER — ANTICOAGULATION VISIT (OUTPATIENT)
Dept: VASCULAR LAB | Facility: MEDICAL CENTER | Age: 47
End: 2017-08-14
Attending: INTERNAL MEDICINE
Payer: COMMERCIAL

## 2017-08-14 DIAGNOSIS — D68.51 FACTOR V LEIDEN (HCC): ICD-10-CM

## 2017-08-14 LAB
INR BLD: 1.5 (ref 0.9–1.2)
INR PPP: 1.5 (ref 2–3.5)

## 2017-08-14 PROCEDURE — 85610 PROTHROMBIN TIME: CPT

## 2017-08-14 PROCEDURE — 99212 OFFICE O/P EST SF 10 MIN: CPT

## 2017-08-14 NOTE — MR AVS SNAPSHOT
José Bray   2017 8:15 AM   Anticoagulation Visit   MRN: [id number]    Department:  Vascular Medicine   Dept Phone:  [phone]    Description:  Male : 1970   Provider:  [facility] EXAM 4           Allergies as of 2017     Allergen Noted Reactions    Yellow Dye 2015         You were diagnosed with     Factor V Leiden (CMS-HCC)   [580471]         Vital Signs     Smoking Status                   Never Smoker            Basic Information     Date Of Birth Sex Race Ethnicity Preferred Language    1970 Male White Non- English      Your appointments     Aug 21, 2017  4:30 PM   Established Patient with [facility] EXAM 4   [facility]  (--)    [address]  [place] [address]   [phone]            Sep 27, 2017  4:00 PM   NEW TO YOU with Desmond Calderon M.D.   [facility] [place] (--)    [address]  [address]  [place] [id number] [phone]              Problem List              ICD-10-CM Priority Class Noted - Resolved    Factor V Leiden (CMS-HCC) D68.51   2015 - Present    Chronic anticoagulation Z79.01   2015 - Present    Gastroesophageal reflux disease without esophagitis K21.9   2015 - Present    Type 2 diabetes mellitus without complication (CMS-HCC) E11.9   2015 - Present    Cervicalgia M54.2   2015 - Present    Tinnitus H93.19   2015 - Present    Abdominal bloating R14.0   3/1/2017 - Present      Health Maintenance        Date Due Completion Dates    IMM HEP B VACCINE (1 of 3 - Primary Series) 1969 ---    IMM DTaP/Tdap/Td Vaccine (1 - Tdap) 1988 ---    IMM PNEUMOCOCCAL 19-64 (ADULT) MEDIUM RISK SERIES (1 of 1 - PPSV23) 1988 ---    IMM INFLUENZA (1) 2017 10/1/2016    A1C SCREENING 2017, 2016, 2016, 10/10/2015    URINE ACR / MICROALBUMIN 2017, 10/10/2015    RETINAL SCREENING 2017    DIABETES  MONOFILAMENT / LE EXAM 5/5/2018 5/5/2017    FASTING LIPID PROFILE 5/16/2018 5/16/2017, 11/28/2016, 5/13/2016, 10/10/2015    SERUM CREATININE 5/16/2018 5/16/2017, 11/28/2016, 5/13/2016, 12/14/2015, 10/10/2015            Results     POCT Protime      Component    INR    1.5                        Current Immunizations     Influenza Vaccine Quad Inj (Preserved) 10/1/2016      Below and/or attached are the medications your provider expects you to take. Review all of your home medications and newly ordered medications with your provider and/or pharmacist. Follow medication instructions as directed by your provider and/or pharmacist. Please keep your medication list with you and share with your provider. Update the information when medications are discontinued, doses are changed, or new medications (including over-the-counter products) are added; and carry medication information at all times in the event of emergency situations     Allergies:  YELLOW DYE - (reactions not documented)               Medications  Valid as of: August 14, 2017 -  8:46 AM    Generic Name Brand Name Tablet Size Instructions for use    AcetaZOLAMIDE (Tab) DIAMOX 125 MG Take 1 Tab by mouth 2 times a day.        Ascorbic Acid (Tab) ascorbic acid 500 MG Take 1,000 mg by mouth every day.        B Complex Vitamins   Take  by mouth.        Biotin   Take 10,000 mg by mouth.        GlipiZIDE (Tab) GLUCOTROL 5 MG Take 1 Tab by mouth 2 times a day.        MetFORMIN HCl (Tab) GLUCOPHAGE 1000 MG Take 1 Tab by mouth 2 times a day, with meals. Indications: Type 2 Diabetes        Omeprazole (CAPSULE DELAYED RELEASE) PRILOSEC 20 MG Take 1 Cap by mouth every day.        PredniSONE (Tab) DELTASONE 20 MG Take 1 Tab by mouth every day.        Warfarin Sodium (Tab) COUMADIN 5 MG Take 1 Tab by mouth every day.        Warfarin Sodium (Tab) COUMADIN 5 MG Take 1 Tab by mouth every day.        .                 Medicines prescribed today were sent to:     [facility]  [address] - [place] (), [place] - [address]    [address] [place] () [place] [address]    Phone: [phone] Fax: [phone]    Open 24 Hours?: No      Medication refill instructions:       If your prescription bottle indicates you have medication refills left, it is not necessary to call your provider’s office. Please contact your pharmacy and they will refill your medication.    If your prescription bottle indicates you do not have any refills left, you may request refills at any time through one of the following ways: The online [email or web address] system (except Urgent Care), by calling your provider’s office, or by asking your pharmacy to contact your provider’s office with a refill request. Medication refills are processed only during regular business hours and may not be available until the next business day. Your provider may request additional information or to have a follow-up visit with you prior to refilling your medication.   *Please Note: Medication refills are assigned a new Rx number when refilled electronically. Your pharmacy may indicate that no refills were authorized even though a new prescription for the same medication is available at the pharmacy. Please request the medicine by name with the pharmacy before contacting your provider for a refill.        Warfarin Dosing Calendar   August 2017 Details    Sun Mon Tue Wed Thu Fri Sat       1               2               3               4               5                 6               7               8               9               10               11               12                 13               14   1.5   10 mg   See details      15      10 mg         16      7.5 mg         17      7.5 mg         18      7.5 mg         19      7.5 mg           20      7.5 mg         21      10 mg         22               23               24               25               26                 27               28               29               30               31                     Date Details   08/14 This INR check   INR: 1.5       Date of next INR:  8/21/2017         How to take your warfarin dose     To take:  7.5 mg Take 1.5 of the 5 mg tablets.    To take:  10 mg Take 2 of the 5 mg tablets.              [employer] Access Code: Activation code not generated  Current [employer] Status: Active

## 2017-08-14 NOTE — PROGRESS NOTES
Anticoagulation Summary as of 8/14/2017     INR goal 2.0-3.0   Selected INR 1.5! (8/14/2017)   Maintenance plan 10 mg (5 mg x 2) on Mon; 7.5 mg (5 mg x 1.5) all other days   Weekly total 55 mg   Plan last modified Paul Jackson, PHARMD (8/14/2017)   Next INR check 8/21/2017   Target end date Indefinite    Indications   Pulmonary embolism (CMS-HCC) (Resolved) [I26.99]  Factor V Leiden (CMS-[facility]) [D68.51]         Anticoagulation Episode Summary     [facility]    Preferred lab     Send INR reminders to     Comments       Anticoagulation Care Providers     Provider Role Specialty Phone number    Edmundo Rao M.D. Referring Family Medicine [phone]    [facility] Responsible  [phone]    TITO Ash Responsible Cardiology [phone]        Anticoagulation Patient Findings    Pt's INR is SUBtherapeutic.  Pt denies any unusual s/s of bleeding, bruising, clotting or any changes to medications.  States he is still juicing and uses kale.  Educated pt of keeping a consistent diet.  Moderately interested in a DOAC.  Sent in ProNoxis for pt to check the price with his insurance.  He would be a good DOAC candidate given his diet changes frequently.  Forgo bridging as VTE was >1 year ago.    Bolus warfarin x2 days then begin 10% increased regimen    Follow up in 1 week.      Paul Jackson, PHARMD

## 2017-08-21 ENCOUNTER — ANTICOAGULATION VISIT (OUTPATIENT)
Dept: VASCULAR LAB | Facility: MEDICAL CENTER | Age: 47
End: 2017-08-21
Attending: INTERNAL MEDICINE
Payer: COMMERCIAL

## 2017-08-21 DIAGNOSIS — D68.51 FACTOR V LEIDEN (HCC): ICD-10-CM

## 2017-08-21 LAB
INR BLD: 2.3 (ref 0.9–1.2)
INR PPP: 2.3 (ref 2–3.5)

## 2017-08-21 PROCEDURE — 99211 OFF/OP EST MAY X REQ PHY/QHP: CPT

## 2017-08-21 PROCEDURE — 85610 PROTHROMBIN TIME: CPT

## 2017-08-21 NOTE — MR AVS SNAPSHOT
José Bray   2017 4:30 PM   Anticoagulation Visit   MRN: [id number]    Department:  Vascular Medicine   Dept Phone:  [phone]    Description:  Male : 1970   Provider:  [facility] EXAM 4           Allergies as of 2017     Allergen Noted Reactions    Yellow Dye 2015         You were diagnosed with     Factor V Leiden (CMS-HCC)   [782968]         Vital Signs     Smoking Status                   Never Smoker            Basic Information     Date Of Birth Sex Race Ethnicity Preferred Language    1970 Male White Non- English      Your appointments     Sep 18, 2017  4:45 PM   Established Patient with [facility] EXAM 5   [facility]  (--)    [address]  [place] [address]   [phone]            Sep 27, 2017  4:00 PM   NEW TO YOU with Desmond Calderon M.D.   [facility] (--)    [address]  [address]  [place] [id number] [phone]              Problem List              ICD-10-CM Priority Class Noted - Resolved    Factor V Leiden (CMS-HCC) D68.51   2015 - Present    Chronic anticoagulation Z79.01   2015 - Present    Gastroesophageal reflux disease without esophagitis K21.9   2015 - Present    Type 2 diabetes mellitus without complication (CMS-HCC) E11.9   2015 - Present    Cervicalgia M54.2   2015 - Present    Tinnitus H93.19   2015 - Present    Abdominal bloating R14.0   3/1/2017 - Present      Health Maintenance        Date Due Completion Dates    IMM HEP B VACCINE (1 of 3 - Primary Series) 1970 ---    IMM DTaP/Tdap/Td Vaccine (1 - Tdap) 1988 ---    IMM PNEUMOCOCCAL 19-64 (ADULT) MEDIUM RISK SERIES (1 of 1 - PPSV23) 1988 ---    IMM INFLUENZA (1) 2017 10/1/2016    A1C SCREENING 2017, 2016, 2016, 10/10/2015    URINE ACR / MICROALBUMIN 2017, 10/10/2015    RETINAL SCREENING 2017    DIABETES  MONOFILAMENT / LE EXAM 5/5/2018 5/5/2017    FASTING LIPID PROFILE 5/16/2018 5/16/2017, 11/28/2016, 5/13/2016, 10/10/2015    SERUM CREATININE 5/16/2018 5/16/2017, 11/28/2016, 5/13/2016, 12/14/2015, 10/10/2015            Results     POCT Protime      Component    INR    2.3                        Current Immunizations     Influenza Vaccine Quad Inj (Preserved) 10/1/2016      Below and/or attached are the medications your provider expects you to take. Review all of your home medications and newly ordered medications with your provider and/or pharmacist. Follow medication instructions as directed by your provider and/or pharmacist. Please keep your medication list with you and share with your provider. Update the information when medications are discontinued, doses are changed, or new medications (including over-the-counter products) are added; and carry medication information at all times in the event of emergency situations     Allergies:  YELLOW DYE - (reactions not documented)               Medications  Valid as of: August 21, 2017 -  5:09 PM    Generic Name Brand Name Tablet Size Instructions for use    AcetaZOLAMIDE (Tab) DIAMOX 125 MG Take 1 Tab by mouth 2 times a day.        Ascorbic Acid (Tab) ascorbic acid 500 MG Take 1,000 mg by mouth every day.        B Complex Vitamins   Take  by mouth.        Biotin   Take 10,000 mg by mouth.        GlipiZIDE (Tab) GLUCOTROL 5 MG Take 1 Tab by mouth 2 times a day.        MetFORMIN HCl (Tab) GLUCOPHAGE 1000 MG Take 1 Tab by mouth 2 times a day, with meals. Indications: Type 2 Diabetes        Omeprazole (CAPSULE DELAYED RELEASE) PRILOSEC 20 MG Take 1 Cap by mouth every day.        PredniSONE (Tab) DELTASONE 20 MG Take 1 Tab by mouth every day.        Rivaroxaban (Tab) XARELTO 20 MG Take 1 Tab by mouth with dinner.        Warfarin Sodium (Tab) COUMADIN 5 MG Take 1 Tab by mouth every day.        Warfarin Sodium (Tab) COUMADIN 5 MG Take 1 Tab by mouth every day.        .                  Medicines prescribed today were sent to:     [id number] - [place] (), [place] - [address]    [address] () [place] [address]    Phone: [phone] Fax: [phone]    Open 24 Hours?: No      Medication refill instructions:       If your prescription bottle indicates you have medication refills left, it is not necessary to call your provider’s office. Please contact your pharmacy and they will refill your medication.    If your prescription bottle indicates you do not have any refills left, you may request refills at any time through one of the following ways: The online mediafeedia system (except Urgent Care), by calling your provider’s office, or by asking your pharmacy to contact your provider’s office with a refill request. Medication refills are processed only during regular business hours and may not be available until the next business day. Your provider may request additional information or to have a follow-up visit with you prior to refilling your medication.   *Please Note: Medication refills are assigned a new Rx number when refilled electronically. Your pharmacy may indicate that no refills were authorized even though a new prescription for the same medication is available at the pharmacy. Please request the medicine by name with the pharmacy before contacting your provider for a refill.        Warfarin Dosing Calendar   August 2017 Details    Sun Mon Tue Wed Thu Fri Sat       1               2               3               4               5                 6               7               8               9               10               11               12                 13               14               15               16               17               18               19                 20               21   2.3   10 mg   See details      22      7.5 mg         23      7.5 mg         24      7.5 mg         25      7.5 mg         26      7.5 mg           27      7.5 mg            28      10 mg         29               30               31                  Date Details   08/21 This INR check   INR: 2.3       Date of next INR:  8/28/2017         How to take your warfarin dose     To take:  7.5 mg Take 1.5 of the 5 mg tablets.    To take:  10 mg Take 2 of the 5 mg tablets.              [employer] Access Code: Activation code not generated  Current [employer] Status: Active

## 2017-08-22 NOTE — PROGRESS NOTES
OP Anticoagulation Service Note    Date: 8/21/2017    Anticoagulation Summary as of 8/21/2017     INR goal 2.0-3.0   Selected INR 2.3 (8/21/2017)   Maintenance plan 10 mg (5 mg x 2) on Mon; 7.5 mg (5 mg x 1.5) all other days   Weekly total 55 mg   Plan last modified Paul Jackson, PHARMD (8/14/2017)   Next INR check 8/28/2017   Target end date Indefinite    Indications   Pulmonary embolism (CMS-HCC) (Resolved) [I26.99]  Factor V Leiden (CMS-HCC) [D68.51]         Anticoagulation Episode Summary     INR check location [facility]    Preferred lab     Send INR reminders to     Comments       Anticoagulation Care Providers     Provider Role Specialty Phone number    Edmundo Rao M.D. Referring Family Medicine [phone]    [facility] Responsible  [phone]    TITO Ash Responsible Cardiology [phone]        Anticoagulation Patient Findings   Negatives Missed Doses, Extra Doses, Medication Changes, Antibiotic Use, Diet Changes, Dental/Other Procedures, Hospitalization, Bleeding Gums, Nose Bleeds, Blood in Urine, Blood in Stool, Any Bruising, Other Complaints          Plan:  Patient is therapeutic today. Confirmed dosing regimen. Patient denies any changes in medications or diet. Patient denies any signs or symptoms of bleeding or clotting. Instructed patient to call clinic if any unusual bleeding or bruising occurs. Pt wants to switch to NOAC, educatd pt on on Xarelto. He will stop wafarin today, start Xarelto 20 mg once daily. Reviewed most recent lab, WNL. Provided pt with box of xarelto 20 mg samples. He has Rx savings card as well. Follow up 4 weeks.       Laura Calles, Pharm D

## 2017-09-16 ENCOUNTER — HOSPITAL ENCOUNTER (OUTPATIENT)
Dept: LAB | Facility: MEDICAL CENTER | Age: 47
End: 2017-09-16
Attending: INTERNAL MEDICINE
Payer: COMMERCIAL

## 2017-09-16 DIAGNOSIS — S93.401A SPRAIN OF RIGHT ANKLE, UNSPECIFIED LIGAMENT, INITIAL ENCOUNTER: ICD-10-CM

## 2017-09-16 DIAGNOSIS — E11.9 DIABETES MELLITUS TYPE 2, NONINSULIN DEPENDENT (HCC): ICD-10-CM

## 2017-09-16 DIAGNOSIS — M25.471 ANKLE SWELLING, RIGHT: ICD-10-CM

## 2017-09-16 LAB
25(OH)D3 SERPL-MCNC: 25 NG/ML (ref 30–100)
CREAT SERPL-MCNC: 0.91 MG/DL (ref 0.5–1.4)
EST. AVERAGE GLUCOSE BLD GHB EST-MCNC: 143 MG/DL
GFR SERPL CREATININE-BSD FRML MDRD: >60 ML/MIN/1.73 M 2
HBA1C MFR BLD: 6.6 % (ref 0–5.6)
T4 FREE SERPL-MCNC: 1.14 NG/DL (ref 0.53–1.43)
TSH SERPL DL<=0.005 MIU/L-ACNC: 1.13 UIU/ML (ref 0.3–3.7)

## 2017-09-16 PROCEDURE — 82306 VITAMIN D 25 HYDROXY: CPT

## 2017-09-16 PROCEDURE — 84439 ASSAY OF FREE THYROXINE: CPT

## 2017-09-16 PROCEDURE — 84443 ASSAY THYROID STIM HORMONE: CPT

## 2017-09-16 PROCEDURE — 82565 ASSAY OF CREATININE: CPT

## 2017-09-16 PROCEDURE — 83695 ASSAY OF LIPOPROTEIN(A): CPT

## 2017-09-16 PROCEDURE — 36415 COLL VENOUS BLD VENIPUNCTURE: CPT

## 2017-09-16 PROCEDURE — 83036 HEMOGLOBIN GLYCOSYLATED A1C: CPT

## 2017-09-18 ENCOUNTER — ANTICOAGULATION VISIT (OUTPATIENT)
Dept: VASCULAR LAB | Facility: MEDICAL CENTER | Age: 47
End: 2017-09-18
Attending: INTERNAL MEDICINE
Payer: COMMERCIAL

## 2017-09-18 VITALS — SYSTOLIC BLOOD PRESSURE: 135 MMHG | HEART RATE: 75 BPM | DIASTOLIC BLOOD PRESSURE: 78 MMHG

## 2017-09-18 DIAGNOSIS — Z79.01 CHRONIC ANTICOAGULATION: ICD-10-CM

## 2017-09-18 LAB — LPA SERPL-MCNC: 51 MG/DL

## 2017-09-18 PROCEDURE — 99211 OFF/OP EST MAY X REQ PHY/QHP: CPT

## 2017-09-18 NOTE — PROGRESS NOTES
OP Anticoagulation Service Note    Date: 9/18/2017    Anticoaguation   Pt is on anticoagulation for PE , duration of treatment indefinite    Health Status Since Last Assessment   Patient denies any new relevant medical problems, ED visits or hospitalizations   Patient denies any embolic events (stroke/tia/systemic embolism)    Adherence with DOAC Therapy   Pt has NOT missed any doses in the average week    Bleeding Risk Assessment     Denies Epistaxis   Pt denies any excessive or unusual bleeding/hematomas.  Pt denies any GI bleeds or hematemesis.  Pt denies any concerning daily headache or sub dural hematoma symptoms.     Pt denies any hematuria or abnormal vaginal bleeding.   Latest Hemoglobin 15.8   ETOH overuse NONE     Creatinine Clearance/Renal Function     Latest ClCr >60     Drug Interactions   ASA/other antiplatelets NONE   NSAID NONE   Other drug interactions  NONE    Examination   Blood Pressure WNL   Symptomatic hypotension NONE   Significant gait impairment/imbalance/high risk for falls? NONE    Final Assessment and Recommendations:   Patient appears stable from the anticoagulation staindpoint.     Benefits of continued D0AC therapy ouyweigh risks for this patient   Recommend pt continue with current DOAC therapy     Medication:     Xarelto 20 mg once daily for remainder of treatment.     DO NOT STOP anticoagulation unless directed by provider or our clinic.     Next Appointment: 6 months, March 19th 2018       For questions, please contact Outpatient Anticoagulation Service [phone].     Laura Calles, Pharm D

## 2017-09-27 ENCOUNTER — OFFICE VISIT (OUTPATIENT)
Dept: MEDICAL GROUP | Facility: PHYSICIAN GROUP | Age: 47
End: 2017-09-27
Payer: COMMERCIAL

## 2017-09-27 VITALS
HEIGHT: 75 IN | SYSTOLIC BLOOD PRESSURE: 114 MMHG | BODY MASS INDEX: 28.4 KG/M2 | OXYGEN SATURATION: 94 % | TEMPERATURE: 98.7 F | HEART RATE: 80 BPM | WEIGHT: 228.4 LBS | RESPIRATION RATE: 16 BRPM | DIASTOLIC BLOOD PRESSURE: 74 MMHG

## 2017-09-27 DIAGNOSIS — E11.9 TYPE 2 DIABETES MELLITUS WITHOUT COMPLICATION, UNSPECIFIED LONG TERM INSULIN USE STATUS: ICD-10-CM

## 2017-09-27 DIAGNOSIS — Z79.01 CHRONIC ANTICOAGULATION: ICD-10-CM

## 2017-09-27 DIAGNOSIS — E55.9 VITAMIN D DEFICIENCY: ICD-10-CM

## 2017-09-27 DIAGNOSIS — E78.5 DYSLIPIDEMIA: ICD-10-CM

## 2017-09-27 DIAGNOSIS — Z23 FLU VACCINE NEED: ICD-10-CM

## 2017-09-27 PROCEDURE — 90471 IMMUNIZATION ADMIN: CPT | Performed by: INTERNAL MEDICINE

## 2017-09-27 PROCEDURE — 90686 IIV4 VACC NO PRSV 0.5 ML IM: CPT | Performed by: INTERNAL MEDICINE

## 2017-09-27 PROCEDURE — 99396 PREV VISIT EST AGE 40-64: CPT | Mod: 25 | Performed by: INTERNAL MEDICINE

## 2017-09-27 NOTE — PROGRESS NOTES
"Subjective:  José is a 47 y.o. male with the following No past medical history on file. History reviewed. No pertinent family history.  The patient is on the following medications:   Current Outpatient Prescriptions:   •  rivaroxaban (XARELTO) 20 MG Tab tablet, Take 1 Tab by mouth with dinner., Disp: 30 Tab, Rfl: 5  •  glipiZIDE (GLUCOTROL) 5 MG Tab, Take 1 Tab by mouth 2 times a day., Disp: 60 Tab, Rfl: 6  •  B Complex Vitamins (B COMPLEX PO), Take  by mouth., Disp: , Rfl:   •  metformin (GLUCOPHAGE) 1000 MG tablet, Take 1 Tab by mouth 2 times a day, with meals. Indications: Type 2 Diabetes, Disp: 180 Tab, Rfl: 3  •  BIOTIN PO, Take 10,000 mg by mouth., Disp: , Rfl:   •  ascorbic acid (ASCORBIC ACID) 500 MG Tab, Take 1,000 mg by mouth every day., Disp: , Rfl:     HPI; 47-year-old male patient is here to establish care, currently on glipizide and metformin for diabetes denies having polyuria or polydipsia, on xarelto for history of right lower extremity DVT and pulmonary embolism after long distance traveling, patient also has family history of DVT and factor V Leiden mutation, denies having lower extremity pain, chest pain, shortness of breath or cough.         ROS:  See HPI    Blood pressure 114/74, pulse 80, temperature 37.1 °C (98.7 °F), resp. rate 16, height 1.892 m (6' 2.5\"), weight 103.6 kg (228 lb 6.4 oz), SpO2 94 %.on RA  Objective:  Patient is well appearing and in no acute distress.  Pharynx is clear.  Neck is soft and supple with no cervical or supraclavicular lymphadenopathy, thyromegaly or masses, no JVD.  Lungs clear to auscultation bilaterally with normal respiratory effort. Abdomen soft, non-tender on palpation,not distended. Heart regular rate and rhythm without murmur. Extremities without any clubbing, cyanosis, or edema.   Assessment and Plan:  1. DM; on glipizide 5 mg po bid, metformin 1000 mg po po bid.    Ref. Range 11/28/2016 08:13 5/16/2017 08:08 9/16/2017 10:02   Glycohemoglobin Latest " Ref Range: 0.0 - 5.6 % 7.0 (H) 7.5 (H) 6.6 (H)        Ref. Range 11/28/2016 08:13 5/16/2017 08:08 9/16/2017 10:02   Creatinine Latest Ref Range: 0.50 - 1.40 mg/dL 0.73 (L) 0.95 0.91   GFR If  Latest Ref Range: >60 mL/min/1.73 m 2 128 >60 >60   GFR If Non  Latest Ref Range: >60 mL/min/1.73 m 2 110 >60 >60       2. History of DVT PE/ factor V Leiden mutation; family history of DVT, currently on Xarelto 20 mg daily, asymptomatic.     3. Dyslipidemia   Ref. Range 11/28/2016 08:13 5/16/2017 08:08 9/16/2017 10:02   Cholesterol,Tot Latest Ref Range: 100 - 199 mg/dL 219 (H) 203 (H)    Triglycerides Latest Ref Range: 0 - 149 mg/dL 350 (H) 134    HDL Latest Ref Range: >=40 mg/dL 33 (L) 39 (A)    LDL Latest Ref Range: <100 mg/dL 116 (H) 137 (H)    VLDL Cholesterol Calc Latest Ref Range: 5 - 40 mg/dL 70 (H)     Lipoprotein A Latest Ref Range: <=29 mg/dL   51 (H)       4. Vitamin D insufficiency    Ref. Range 9/16/2017 10:02   25-Hydroxy   Vitamin D 25 Latest Ref Range: 30 - 100 ng/mL 25 (L)       5. Requests flu vaccine        Patient is advised on preventive and supportive care, diet and lifestyle modification, daily walking ,exercise and weight loss. Discussed alternative therapy for dyslipidemia and diabetes, OTC vitamin D3 supplements as instructed, monitor labs. Flu vaccine was given.        Please note that this dictation was created using voice recognition software. I have worked with consultants from the vendor as well as technical experts from [employer] to optimize the interface. I have made every reasonable attempt to correct obvious errors, but I expect that there are errors of grammar and possibly content that I did not discover before finalizing the note.

## 2017-11-14 DIAGNOSIS — D68.51 FACTOR V LEIDEN (HCC): ICD-10-CM

## 2017-11-22 DIAGNOSIS — E11.9 TYPE 2 DIABETES MELLITUS WITHOUT COMPLICATION, WITHOUT LONG-TERM CURRENT USE OF INSULIN (HCC): ICD-10-CM

## 2018-01-23 RX ORDER — GLIPIZIDE 5 MG/1
5 TABLET ORAL 2 TIMES DAILY
Qty: 180 TAB | Refills: 0 | Status: SHIPPED | OUTPATIENT
Start: 2018-01-23 | End: 2018-04-03

## 2018-03-19 ENCOUNTER — ANTICOAGULATION VISIT (OUTPATIENT)
Dept: VASCULAR LAB | Facility: MEDICAL CENTER | Age: 48
End: 2018-03-19
Attending: INTERNAL MEDICINE
Payer: COMMERCIAL

## 2018-03-19 VITALS — DIASTOLIC BLOOD PRESSURE: 81 MMHG | HEART RATE: 88 BPM | SYSTOLIC BLOOD PRESSURE: 132 MMHG

## 2018-03-19 DIAGNOSIS — D68.51 FACTOR V LEIDEN (HCC): ICD-10-CM

## 2018-03-19 LAB
INR BLD: 1 (ref 0.9–1.2)
INR PPP: 1 (ref 2–3.5)

## 2018-03-19 PROCEDURE — 99212 OFFICE O/P EST SF 10 MIN: CPT | Performed by: NURSE PRACTITIONER

## 2018-03-19 PROCEDURE — 85610 PROTHROMBIN TIME: CPT

## 2018-03-19 NOTE — PROGRESS NOTES
Diagnosis: PE, Factor V leiden  Drug: Xarelto 20 mg QD  Duration of therapy: Indefinite    Health Status Since Last Assessment  No changes since last visit. Denies any medication changes. Tolerating Xarelto without problem. Copay is $25 which he can afford. He is establishing with new PCP in April.    Adherence with DOAC Therapy  NoneBLEEDING RISK ASSESSMENT NB:    Bleeding Risk Assessment  None of the following:  Severe epistaxis   Hemoptysis    Excessive or unusual bruising / hematomas   GIB / melena / BRBPR / hematemesis    Hematuria  Concerning daily headache or subdural hematoma symptoms    Decreasing hemoglobin or new anemia    Latest hemoglobin:     Lab Results   Component Value Date/Time    WBC 4.2 (L) 05/16/2017 08:08 AM    RBC 5.12 05/16/2017 08:08 AM    HEMOGLOBIN 15.8 05/16/2017 08:08 AM    HEMATOCRIT 46.1 05/16/2017 08:08 AM    MCV 90.0 05/16/2017 08:08 AM    MCH 30.9 05/16/2017 08:08 AM    MCHC 34.3 05/16/2017 08:08 AM    MPV 11.9 05/16/2017 08:08 AM    NEUTSPOLYS 56.60 10/10/2015 11:08 AM    LYMPHOCYTES 31.00 10/10/2015 11:08 AM    MONOCYTES 9.30 10/10/2015 11:08 AM    EOSINOPHILS 1.80 10/10/2015 11:08 AM    BASOPHILS 1.30 10/10/2015 11:08 AM        EtOH overuse - no  Falls, presyncope, syncope, or seizures - no  Uncontrolled hypertension - no  CREATININE CLEARANCE /    Creatinine Clearance/Renal Function  Latest eGFR / creatinine:  Lab Results   Component Value Date/Time    SODIUM 137 05/16/2017 08:08 AM    POTASSIUM 4.3 05/16/2017 08:08 AM    CHLORIDE 104 05/16/2017 08:08 AM    CO2 26 05/16/2017 08:08 AM    GLUCOSE 174 (H) 05/16/2017 08:08 AM    BUN 17 05/16/2017 08:08 AM    CREATININE 0.91 09/16/2017 10:02 AM    BUNCREATRAT 12 11/28/2016 08:13 AM      • Is eGFR less than 50ml/min - no  Last labs done 9/2018. Will have pt check BMP.  If YES, calculate CrCl (see back)  Any recent dehydrating illness or medications added/changed? i.e. diuretics    Drug Interactions  ASA / other antiplatelets -  avoids  NSAID - avoids  Other drug interactions   (Review med list / OTCs;)  Current Outpatient Prescriptions on File Prior to Visit   Medication Sig Dispense Refill   • glipiZIDE (GLUCOTROL) 5 MG Tab Take 1 Tab by mouth 2 times a day. 180 Tab 0   • metformin (GLUCOPHAGE) 1000 MG tablet TAKE ONE TABLET BY MOUTH TWICE DAILY WITH MEALS 180 Tab 3   • B Complex Vitamins (B COMPLEX PO) Take  by mouth.     • BIOTIN PO Take 10,000 mg by mouth.     • ascorbic acid (ASCORBIC ACID) 500 MG Tab Take 1,000 mg by mouth every day.       No current facility-administered medications on file prior to visit.        Examination  Blood pressure:  132/81  • Elevated BP - no (sBP greater than 160 mmHg)  • Symptomatic hypotension  no  Significant gait impairment / imbalance / high risk for falls  no    Final Assessment and Recommendations:  Patient appears stable from the anticoagulation standpoint  Benefits of continued DOAC therapy outweigh risks for this patient  Recommend continue current DOAC at same dose    Continue taking Xarelto 20 mg daily with food  Get labs done in the 1-2 weeks (make sure to fast after midnight)  Get labs done prior to next visit in September 2018  Don't stop this medication without permission from your doctor  Let all your doctors know you have a history of blood clots and that you take Xarelto  Report bleeding immediately  Avoid other anti-platelet agents    Other Actions:   The rationale for continued DOAC therapy  The potential for minor, major or life-threatening bleeding  Dosing instructions, adherence, risks of non-adherence, handling missed doses  Avoiding OTC ASA & NSAIDs & minimizing EtOH to reduce bleeding risks  Dosing around upcoming procedure / surgery if applicable (see back)    Follow up:  Will follow up with patient in 6 months with labs.    Next Appointment: Monday, September 17, 2018 at 4:30 pm.     Leatha VELIZ

## 2018-03-19 NOTE — Clinical Note
Hi Dr. Milton -  I saw this pt in [facility]. He is establishing with you in 2 weeks. I need recent creatinine so I ordered that and a few other labs you might need because pt was hoping to get all blood drawn at once. Just wanted to let you know.  Thanks,  Leatha VELIZ

## 2018-03-24 ENCOUNTER — HOSPITAL ENCOUNTER (OUTPATIENT)
Dept: LAB | Facility: MEDICAL CENTER | Age: 48
End: 2018-03-24
Attending: INTERNAL MEDICINE
Payer: COMMERCIAL

## 2018-03-24 ENCOUNTER — HOSPITAL ENCOUNTER (OUTPATIENT)
Dept: LAB | Facility: MEDICAL CENTER | Age: 48
End: 2018-03-24
Attending: NURSE PRACTITIONER
Payer: COMMERCIAL

## 2018-03-24 DIAGNOSIS — E11.9 TYPE 2 DIABETES MELLITUS WITHOUT COMPLICATION, UNSPECIFIED LONG TERM INSULIN USE STATUS: ICD-10-CM

## 2018-03-24 DIAGNOSIS — E78.5 DYSLIPIDEMIA: ICD-10-CM

## 2018-03-24 DIAGNOSIS — E55.9 VITAMIN D DEFICIENCY: ICD-10-CM

## 2018-03-24 DIAGNOSIS — D68.51 FACTOR V LEIDEN (HCC): ICD-10-CM

## 2018-03-24 LAB
25(OH)D3 SERPL-MCNC: 28 NG/ML (ref 30–100)
ALBUMIN SERPL BCP-MCNC: 4.2 G/DL (ref 3.2–4.9)
ALBUMIN/GLOB SERPL: 1.7 G/DL
ALP SERPL-CCNC: 69 U/L (ref 30–99)
ALT SERPL-CCNC: 36 U/L (ref 2–50)
ANION GAP SERPL CALC-SCNC: 8 MMOL/L (ref 0–11.9)
AST SERPL-CCNC: 28 U/L (ref 12–45)
BASOPHILS # BLD AUTO: 1.8 % (ref 0–1.8)
BASOPHILS # BLD: 0.09 K/UL (ref 0–0.12)
BILIRUB SERPL-MCNC: 0.9 MG/DL (ref 0.1–1.5)
BUN SERPL-MCNC: 13 MG/DL (ref 8–22)
CALCIUM SERPL-MCNC: 8.7 MG/DL (ref 8.5–10.5)
CHLORIDE SERPL-SCNC: 105 MMOL/L (ref 96–112)
CHOLEST SERPL-MCNC: 186 MG/DL (ref 100–199)
CO2 SERPL-SCNC: 24 MMOL/L (ref 20–33)
CREAT SERPL-MCNC: 0.9 MG/DL (ref 0.5–1.4)
CREAT SERPL-MCNC: 0.9 MG/DL (ref 0.5–1.4)
EOSINOPHIL # BLD AUTO: 0.12 K/UL (ref 0–0.51)
EOSINOPHIL NFR BLD: 2.4 % (ref 0–6.9)
ERYTHROCYTE [DISTWIDTH] IN BLOOD BY AUTOMATED COUNT: 38.9 FL (ref 35.9–50)
EST. AVERAGE GLUCOSE BLD GHB EST-MCNC: 223 MG/DL
GLOBULIN SER CALC-MCNC: 2.5 G/DL (ref 1.9–3.5)
GLUCOSE SERPL-MCNC: 269 MG/DL (ref 65–99)
HBA1C MFR BLD: 9.4 % (ref 0–5.6)
HCT VFR BLD AUTO: 45.7 % (ref 42–52)
HDLC SERPL-MCNC: 35 MG/DL
HGB BLD-MCNC: 15.7 G/DL (ref 14–18)
IMM GRANULOCYTES # BLD AUTO: 0.01 K/UL (ref 0–0.11)
IMM GRANULOCYTES NFR BLD AUTO: 0.2 % (ref 0–0.9)
LDLC SERPL CALC-MCNC: 113 MG/DL
LYMPHOCYTES # BLD AUTO: 2.27 K/UL (ref 1–4.8)
LYMPHOCYTES NFR BLD: 44.7 % (ref 22–41)
MCH RBC QN AUTO: 30.3 PG (ref 27–33)
MCHC RBC AUTO-ENTMCNC: 34.4 G/DL (ref 33.7–35.3)
MCV RBC AUTO: 88.2 FL (ref 81.4–97.8)
MONOCYTES # BLD AUTO: 0.45 K/UL (ref 0–0.85)
MONOCYTES NFR BLD AUTO: 8.9 % (ref 0–13.4)
NEUTROPHILS # BLD AUTO: 2.14 K/UL (ref 1.82–7.42)
NEUTROPHILS NFR BLD: 42 % (ref 44–72)
NRBC # BLD AUTO: 0 K/UL
NRBC BLD-RTO: 0 /100 WBC
PLATELET # BLD AUTO: 200 K/UL (ref 164–446)
PMV BLD AUTO: 11.7 FL (ref 9–12.9)
POTASSIUM SERPL-SCNC: 3.8 MMOL/L (ref 3.6–5.5)
PROT SERPL-MCNC: 6.7 G/DL (ref 6–8.2)
PSA SERPL-MCNC: 0.54 NG/ML (ref 0–4)
RBC # BLD AUTO: 5.18 M/UL (ref 4.7–6.1)
SODIUM SERPL-SCNC: 137 MMOL/L (ref 135–145)
TRIGL SERPL-MCNC: 191 MG/DL (ref 0–149)
WBC # BLD AUTO: 5.1 K/UL (ref 4.8–10.8)

## 2018-03-24 PROCEDURE — 82306 VITAMIN D 25 HYDROXY: CPT

## 2018-03-24 PROCEDURE — 36415 COLL VENOUS BLD VENIPUNCTURE: CPT

## 2018-03-24 PROCEDURE — 84153 ASSAY OF PSA TOTAL: CPT

## 2018-03-24 PROCEDURE — 80053 COMPREHEN METABOLIC PANEL: CPT

## 2018-03-24 PROCEDURE — 85025 COMPLETE CBC W/AUTO DIFF WBC: CPT

## 2018-03-24 PROCEDURE — 82565 ASSAY OF CREATININE: CPT

## 2018-03-24 PROCEDURE — 83036 HEMOGLOBIN GLYCOSYLATED A1C: CPT

## 2018-03-24 PROCEDURE — 80061 LIPID PANEL: CPT

## 2018-03-24 PROCEDURE — 83695 ASSAY OF LIPOPROTEIN(A): CPT

## 2018-03-26 LAB — LPA SERPL-MCNC: 39 MG/DL

## 2018-04-03 ENCOUNTER — OFFICE VISIT (OUTPATIENT)
Dept: MEDICAL GROUP | Facility: PHYSICIAN GROUP | Age: 48
End: 2018-04-03
Payer: COMMERCIAL

## 2018-04-03 VITALS
TEMPERATURE: 97.1 F | WEIGHT: 228.8 LBS | BODY MASS INDEX: 28.45 KG/M2 | OXYGEN SATURATION: 94 % | HEART RATE: 80 BPM | DIASTOLIC BLOOD PRESSURE: 82 MMHG | RESPIRATION RATE: 16 BRPM | SYSTOLIC BLOOD PRESSURE: 108 MMHG | HEIGHT: 75 IN

## 2018-04-03 DIAGNOSIS — D68.51 FACTOR V LEIDEN (HCC): ICD-10-CM

## 2018-04-03 DIAGNOSIS — Z86.718 HISTORY OF DVT (DEEP VEIN THROMBOSIS): ICD-10-CM

## 2018-04-03 DIAGNOSIS — Z79.01 CHRONIC ANTICOAGULATION: ICD-10-CM

## 2018-04-03 DIAGNOSIS — E11.9 TYPE 2 DIABETES MELLITUS WITHOUT COMPLICATION, UNSPECIFIED LONG TERM INSULIN USE STATUS: ICD-10-CM

## 2018-04-03 DIAGNOSIS — E78.2 MIXED HYPERLIPIDEMIA: ICD-10-CM

## 2018-04-03 PROBLEM — R14.0 ABDOMINAL BLOATING: Status: RESOLVED | Noted: 2017-03-01 | Resolved: 2018-04-03

## 2018-04-03 PROCEDURE — 99214 OFFICE O/P EST MOD 30 MIN: CPT | Performed by: INTERNAL MEDICINE

## 2018-04-03 RX ORDER — CHOLECALCIFEROL (VITAMIN D3) 125 MCG
4000 CAPSULE ORAL
COMMUNITY

## 2018-04-03 RX ORDER — GLIPIZIDE 10 MG/1
10 TABLET ORAL 2 TIMES DAILY
Qty: 180 TAB | Refills: 3 | Status: SHIPPED | OUTPATIENT
Start: 2018-04-03 | End: 2018-12-04 | Stop reason: SDUPTHER

## 2018-04-03 ASSESSMENT — PATIENT HEALTH QUESTIONNAIRE - PHQ9
CLINICAL INTERPRETATION OF PHQ2 SCORE: 4
SUM OF ALL RESPONSES TO PHQ QUESTIONS 1-9: 6
5. POOR APPETITE OR OVEREATING: 0 - NOT AT ALL

## 2018-04-03 ASSESSMENT — PAIN SCALES - GENERAL: PAINLEVEL: 5=MODERATE PAIN

## 2018-04-03 NOTE — LETTER
[facility]  Maldonado Milton M.D.  [address] [place] [address]  Fax: [phone]   Authorization for Release/Disclosure of   Protected Health Information   Name: VIMAL GARCIA : 1970 SSN: [id number]   Address: [address] [place] [address]  [place] [address] Phone:    [phone] (home) [phone] (work)   I authorize the entity listed below to release/disclose the PHI below to:   [facility]/Maldonado Milton M.D. and Maldonado Milton M.D.   Provider or Entity Name:  [facility]   Address   [facility], [address] [place] [address] Phone:  [phone]    Fax:     Reason for request: continuity of care   Information to be released:    [  ] LAST COLONOSCOPY,  including any PATH REPORT and follow-up  [  ] LAST FIT/COLOGUARD RESULT [  ] LAST DEXA  [  ] LAST MAMMOGRAM  [  ] LAST PAP  [  ] LAST LABS [ X ] RETINA EXAM REPORT  [  ] IMMUNIZATION RECORDS  [  ] Release all info      [  ] Check here and initial the line next to each item to release ALL health information INCLUDING  _____ Care and treatment for drug and / or alcohol abuse  _____ HIV testing, infection status, or AIDS  _____ Genetic Testing    DATES OF SERVICE OR TIME PERIOD TO BE DISCLOSED: _____________  I understand and acknowledge that:  * This Authorization may be revoked at any time by you in writing, except if your health information has already been used or disclosed.  * Your health information that will be used or disclosed as a result of you signing this authorization could be re-disclosed by the recipient. If this occurs, your re-disclosed health information may no longer be protected by State or Federal laws.  * You may refuse to sign this Authorization. Your refusal will not affect your ability to obtain treatment.  * This Authorization becomes effective upon signing and will  on (date) __________.      If no date is indicated, this Authorization will  one (1) year from the signature date.    Name:  José Bray    Signature:   Date:     4/3/2018       PLEASE FAX REQUESTED RECORDS BACK TO: [phone]

## 2018-04-03 NOTE — ASSESSMENT & PLAN NOTE
Poorly controled. a1c 9.4.. He contributes to diet. He is [employer]. He eats junk food, M&M during this season. Never > 7.5 in the past. He does not check glucose at home. Because of his work not able to exercise lately. No complications., gets eye exam regular. He is on metformin 1000 mg bid, glipizide 5 mg BID. He was on statin but he decided to stop it because thought that lipid panel is well. He is not on ace. BP well controled

## 2018-04-03 NOTE — ASSESSMENT & PLAN NOTE
History of DVTs/PE. Mother with factor V deficiency. He is also diagnosed with factor V Leyden deficiency. He is on xarelto. Continue to monitor. No side effects

## 2018-04-03 NOTE — PROGRESS NOTES
Subjective:   José Bray is a 47 y.o. male here today for diabetes, lab work review     Type 2 diabetes mellitus without complication  Poorly controled. a1c 9.4.. He contributes to diet. He is [employer]. He eats junk food, M[employer] during this season. Never > 7.5 in the past. He does not check glucose at home. Because of his work not able to exercise lately. No complications., gets eye exam regular. He is on metformin 1000 mg bid, glipizide 5 mg BID. He was on statin but he decided to stop it because thought that lipid panel is well. He is not on ace. BP well controled     History of DVT (deep vein thrombosis)  Chronic anticoagulation  Factor V Leiden  History of DVTs/PE. Mother with factor V deficiency. He is also diagnosed with factor V Leyden deficiency. He is on xarelto. Continue to monitor. No side effects     Mixed hyperlipidemia  LDL elevated. He refuses statin, he would like to continue healthy lifestyle management        Current medicines (including changes today)  Current Outpatient Prescriptions   Medication Sig Dispense Refill   • Multiple Vitamins-Minerals (MULTIVITAMIN ADULT PO) Take  by mouth.     • Cholecalciferol (VITAMIN D3) 2000 units Tab Take  by mouth.     • glipiZIDE (GLUCOTROL) 10 MG Tab Take 1 Tab by mouth 2 times a day. 180 Tab 3   • rivaroxaban (XARELTO) 20 MG Tab tablet Take 1 Tab by mouth with dinner. 30 Tab 5   • metformin (GLUCOPHAGE) 1000 MG tablet TAKE ONE TABLET BY MOUTH TWICE DAILY WITH MEALS 180 Tab 3   • B Complex Vitamins (B COMPLEX PO) Take  by mouth.     • BIOTIN PO Take 10,000 mg by mouth.     • ascorbic acid (ASCORBIC ACID) 500 MG Tab Take 1,000 mg by mouth every day.       No current facility-administered medications for this visit.      He  has a past medical history of Diabetes (CMS-[facility]); Factor V deficiency (CMS-HCC); and Hyperlipidemia.    Current Outpatient Prescriptions   Medication Sig Dispense Refill   • Multiple Vitamins-Minerals (MULTIVITAMIN ADULT PO) Take  by mouth.  "    • Cholecalciferol (VITAMIN D3) 2000 units Tab Take  by mouth.     • glipiZIDE (GLUCOTROL) 10 MG Tab Take 1 Tab by mouth 2 times a day. 180 Tab 3   • rivaroxaban (XARELTO) 20 MG Tab tablet Take 1 Tab by mouth with dinner. 30 Tab 5   • metformin (GLUCOPHAGE) 1000 MG tablet TAKE ONE TABLET BY MOUTH TWICE DAILY WITH MEALS 180 Tab 3   • B Complex Vitamins (B COMPLEX PO) Take  by mouth.     • BIOTIN PO Take 10,000 mg by mouth.     • ascorbic acid (ASCORBIC ACID) 500 MG Tab Take 1,000 mg by mouth every day.       No current facility-administered medications for this visit.        Allergies as of 04/03/2018 - Reviewed 04/03/2018   Allergen Reaction Noted   • Yellow dye  09/25/2015       Social History     Social History   • Marital status:      Spouse name: N/A   • Number of children: N/A   • Years of education: N/A     Occupational History   • Not on file.     Social History Main Topics   • Smoking status: Former Smoker     Quit date: 11/3/2001   • Smokeless tobacco: Former User     Quit date: 3/1/2011   • Alcohol use No      Comment: drinks non-alcoholic beer   • Drug use: No   • Sexual activity: Yes     Partners: Female      Comment: daughter      Other Topics Concern   • Not on file     Social History Narrative   • No narrative on file        Family History   Problem Relation Age of Onset   • Cancer Mother      melanoma   • Heart Disease Father 61     MI, stent   • Diabetes Maternal Grandmother    • Hyperlipidemia Paternal Grandmother    • Heart Disease Paternal Grandfather      valve problems   • Alcohol/Drug Neg Hx        Past Surgical History:   Procedure Laterality Date   • TONSILLECTOMY         ROS   All systems reviewed are negative except for HPI       Objective:     Blood pressure 108/82, pulse 80, temperature 36.2 °C (97.1 °F), resp. rate 16, height 1.892 m (6' 2.5\"), weight 103.8 kg (228 lb 12.8 oz), SpO2 94 %. Body mass index is 28.98 kg/m².   Physical Exam:  Constitutional: Alert, no " distress.  Skin: Warm, dry, good turgor, no rashes in visible areas.  Eye: Equal, round and reactive, conjunctiva clear, lids normal.  ENMT: Lips without lesions, good dentition, oropharynx clear.  Neck: Trachea midline, no masses, no thyromegaly. No cervical or supraclavicular lymphadenopathy  Respiratory: Unlabored respiratory effort, lungs clear to auscultation, no wheezes, no ronchi.  Cardiovascular: Normal S1, S2, no murmur, no edema.  Abdomen: Soft, non-tender, no masses, no hepatosplenomegaly.  Psych: Alert and oriented x3, normal affect and mood.        Assessment and Plan:   The following treatment plan was discussed    1. Type 2 diabetes mellitus without complication, unspecified long term insulin use status (CMS-HCC)  Poorly controled. Increase glipizide to 10 mg bid. He needs to be on statin, asa, ace. He is hesitant to start meds. Slowly working with him. Healthy lifestyle  - glipiZIDE (GLUCOTROL) 10 MG Tab; Take 1 Tab by mouth 2 times a day.  Dispense: 180 Tab; Refill: 3  - HEMOGLOBIN A1C; Future  - MICROALBUMIN CREAT RATIO URINE; Future  - COMP METABOLIC PANEL; Future    2. Factor V Leiden (CMS-HCC)  3. History of DVT (deep vein thrombosis)  4. Chronic anticoagulation  Continue same treatment, continue to monitor     5. Mixed hyperlipidemia  Continue to monitor       Followup: Return in about 2 months (around 6/3/2018), or if symptoms worsen or fail to improve, for Long, medication management, follow up on DMII.

## 2018-05-11 ENCOUNTER — OFFICE VISIT (OUTPATIENT)
Dept: MEDICAL GROUP | Facility: PHYSICIAN GROUP | Age: 48
End: 2018-05-11
Payer: COMMERCIAL

## 2018-05-11 VITALS
HEIGHT: 75 IN | OXYGEN SATURATION: 94 % | DIASTOLIC BLOOD PRESSURE: 68 MMHG | HEART RATE: 71 BPM | BODY MASS INDEX: 28.35 KG/M2 | SYSTOLIC BLOOD PRESSURE: 110 MMHG | WEIGHT: 228 LBS | TEMPERATURE: 98.4 F | RESPIRATION RATE: 14 BRPM

## 2018-05-11 DIAGNOSIS — R20.0 ARM NUMBNESS LEFT: ICD-10-CM

## 2018-05-11 DIAGNOSIS — E11.9 TYPE 2 DIABETES MELLITUS WITHOUT COMPLICATION, UNSPECIFIED WHETHER LONG TERM INSULIN USE (HCC): ICD-10-CM

## 2018-05-11 PROCEDURE — 99214 OFFICE O/P EST MOD 30 MIN: CPT | Performed by: INTERNAL MEDICINE

## 2018-05-12 NOTE — ASSESSMENT & PLAN NOTE
Pt is here mainly for numbness from shoulder, lower on the left arm. Feels as his arm is falling a sleep. No trauma. It is been going on for two weeks. He has some tenderness on left lower scapula. Range of motion intact. He used to play sports when he was younger. He tells me that he has has C-spine MRI and showed DDD. Sensation intact. He is not taking anything. numbness is intermittent. He denies chest pain, sob, palpitations. He exercise regular. He has no chest pain during exercise

## 2018-05-12 NOTE — PROGRESS NOTES
Subjective:   José Bray is a 47 y.o. male here today for left arm numbness, diet. Diabetes     Arm numbness left  Pt is here mainly for numbness from shoulder, lower on the left arm. Feels as his arm is falling a sleep. No trauma. It is been going on for two weeks. He has some tenderness on left lower scapula. Range of motion intact. He used to play sports when he was younger. He tells me that he has has C-spine MRI and showed DDD. Sensation intact. He is not taking anything. numbness is intermittent. He denies chest pain, sob, palpitations. He exercise regular. He has no chest pain during exercise     Type 2 diabetes mellitus without complication  Lab work is pending. He tells me that he has increased glipizide. He had tried to modify diet, but still has some candies at work. No known complications . Statin speech at next apt . Length discussion in regards to diet, healthy, low carb.        Current medicines (including changes today)  Current Outpatient Prescriptions   Medication Sig Dispense Refill   • Multiple Vitamins-Minerals (MULTIVITAMIN ADULT PO) Take  by mouth.     • Cholecalciferol (VITAMIN D3) 2000 units Tab Take  by mouth.     • glipiZIDE (GLUCOTROL) 10 MG Tab Take 1 Tab by mouth 2 times a day. 180 Tab 3   • rivaroxaban (XARELTO) 20 MG Tab tablet Take 1 Tab by mouth with dinner. 30 Tab 5   • metformin (GLUCOPHAGE) 1000 MG tablet TAKE ONE TABLET BY MOUTH TWICE DAILY WITH MEALS 180 Tab 3   • B Complex Vitamins (B COMPLEX PO) Take  by mouth.     • BIOTIN PO Take 10,000 mg by mouth.     • ascorbic acid (ASCORBIC ACID) 500 MG Tab Take 1,000 mg by mouth every day.       No current facility-administered medications for this visit.      He  has a past medical history of Diabetes (HCC); Factor V deficiency (HCC); and Hyperlipidemia.    Current Outpatient Prescriptions   Medication Sig Dispense Refill   • Multiple Vitamins-Minerals (MULTIVITAMIN ADULT PO) Take  by mouth.     • Cholecalciferol (VITAMIN D3)  "2000 units Tab Take  by mouth.     • glipiZIDE (GLUCOTROL) 10 MG Tab Take 1 Tab by mouth 2 times a day. 180 Tab 3   • rivaroxaban (XARELTO) 20 MG Tab tablet Take 1 Tab by mouth with dinner. 30 Tab 5   • metformin (GLUCOPHAGE) 1000 MG tablet TAKE ONE TABLET BY MOUTH TWICE DAILY WITH MEALS 180 Tab 3   • B Complex Vitamins (B COMPLEX PO) Take  by mouth.     • BIOTIN PO Take 10,000 mg by mouth.     • ascorbic acid (ASCORBIC ACID) 500 MG Tab Take 1,000 mg by mouth every day.       No current facility-administered medications for this visit.        Allergies as of 05/11/2018 - Reviewed 05/11/2018   Allergen Reaction Noted   • Yellow dye  09/25/2015       Social History     Social History   • Marital status:      Spouse name: N/A   • Number of children: N/A   • Years of education: N/A     Occupational History   • Not on file.     Social History Main Topics   • Smoking status: Former Smoker     Quit date: 11/3/2001   • Smokeless tobacco: Former User     Quit date: 3/1/2011   • Alcohol use No      Comment: drinks non-alcoholic beer   • Drug use: No   • Sexual activity: Yes     Partners: Female      Comment: daughter      Other Topics Concern   • Not on file     Social History Narrative   • No narrative on file        Family History   Problem Relation Age of Onset   • Cancer Mother      melanoma   • Heart Disease Father 61     MI, stent   • Diabetes Maternal Grandmother    • Hyperlipidemia Paternal Grandmother    • Heart Disease Paternal Grandfather      valve problems   • Alcohol/Drug Neg Hx        Past Surgical History:   Procedure Laterality Date   • TONSILLECTOMY         ROS   All systems reviewed are negative except for HPI       Objective:     Blood pressure 110/68, pulse 71, temperature 36.9 °C (98.4 °F), resp. rate 14, height 1.892 m (6' 2.5\"), weight 103.4 kg (228 lb), SpO2 94 %. Body mass index is 28.88 kg/m².   Physical Exam:  Constitutional: Alert, no distress.  Skin: Warm, dry, good turgor, no rashes in " visible areas.  Eye: Equal, round and reactive, conjunctiva clear, lids normal.  ENMT: Lips without lesions, good dentition, oropharynx clear.  Neck: Trachea midline, no masses, no thyromegaly. No cervical or supraclavicular lymphadenopathy  Respiratory: Unlabored respiratory effort, lungs clear to auscultation, no wheezes, no ronchi.  Cardiovascular: Normal S1, S2, no murmur, no edema.  Abdomen: Soft, non-tender, no masses, no hepatosplenomegaly.  Psych: Alert and oriented x3, normal affect and mood.  Neck exam: No spinal tenderness to palpation. Normal flexion, extension and lateral rotation ROM. Spurling's test negative.   Shoulder/arm exam: No deformity, erythema, edema or ecchymosis. Tenderness to palpation posterior shoulder, lower scapula. . ROM intact 5/5 strength bilaterally.   Elbow/forearm: Tenderness to palpation: none. Full ROM.  5/5 strength throughout bilaterally. 2+ DTR biceps and triceps bilaterally.         Assessment and Plan:   The following treatment plan was discussed    1. Arm numbness left  MRI of shoulder and neck. Consider PT after MRI. No other reasons.   - MR-SHOULDER-W/O LEFT; Future  - MR-CERVICAL SPINE-W/O; Future    2. Type 2 diabetes mellitus without complication, unspecified whether long term insulin use (HCC)  Continue to monitor     Total 24 minutes face-to-face time spent with patient, with greater than 50% of the total time discussing patient's issues and symptoms as listed above in assessment and plan, as well as managing coordination of care for future evaluation and treatment.    Followup: Return in about 4 weeks (around 6/8/2018), or if symptoms worsen or fail to improve, for Short.

## 2018-05-12 NOTE — ASSESSMENT & PLAN NOTE
Lab work is pending. He tells me that he has increased glipizide. He had tried to modify diet, but still has some candies at work. No known complications . Statin speech at next apt . Length discussion in regards to diet, healthy, low carb.

## 2018-05-23 ENCOUNTER — TELEPHONE (OUTPATIENT)
Dept: MEDICAL GROUP | Facility: PHYSICIAN GROUP | Age: 48
End: 2018-05-23

## 2018-05-23 ENCOUNTER — PATIENT MESSAGE (OUTPATIENT)
Dept: MEDICAL GROUP | Facility: PHYSICIAN GROUP | Age: 48
End: 2018-05-23

## 2018-05-23 ENCOUNTER — HOSPITAL ENCOUNTER (OUTPATIENT)
Dept: RADIOLOGY | Facility: MEDICAL CENTER | Age: 48
End: 2018-05-23
Attending: INTERNAL MEDICINE
Payer: COMMERCIAL

## 2018-05-23 DIAGNOSIS — R20.0 ARM NUMBNESS LEFT: ICD-10-CM

## 2018-05-23 DIAGNOSIS — M50.30 DDD (DEGENERATIVE DISC DISEASE), CERVICAL: ICD-10-CM

## 2018-05-23 PROCEDURE — 72141 MRI NECK SPINE W/O DYE: CPT

## 2018-05-23 PROCEDURE — 73221 MRI JOINT UPR EXTREM W/O DYE: CPT | Mod: LT

## 2018-05-23 NOTE — TELEPHONE ENCOUNTER
Called left voice mail. I reviewed imaging of her neck and shoulder. I think his problems on his arm is due to cervical spine problem. There is some narrowing, moderate to severe to lower cervical spine, which can explain the numbness. He also has some shoulder arthritis, and tendin tear, but my concerned is mostly from the neck. If pt agrees I can place a consultation for spine specialist to start, and if there is a need to see orthopedic later, I can help with that too. Waiting for pt to call us back   Maldonado Milton M.D.

## 2018-05-24 NOTE — TELEPHONE ENCOUNTER
----- Message from Louie Mcdonald Ass't sent at 5/23/2018  2:17 PM PDT -----  Regarding: FW: Test Result Question  Contact: [phone]  Routing to PCP.  Deirdre BARTON    ----- Message -----  From: José Bray  Sent: 5/23/2018   1:44 PM  To: Devan Rhoades Ma  Subject: Test Result Question                             Hi Dr. Milton,    Thank you for the phone call. I agree with your recommendation for next steps. I assume you will process the referral, and I will wait to hear from the spine surgeon.    Thank you again for your help.    Best,    José Bray

## 2018-05-31 ENCOUNTER — HOSPITAL ENCOUNTER (OUTPATIENT)
Dept: LAB | Facility: MEDICAL CENTER | Age: 48
End: 2018-05-31
Attending: INTERNAL MEDICINE
Payer: COMMERCIAL

## 2018-05-31 DIAGNOSIS — E11.9 TYPE 2 DIABETES MELLITUS WITHOUT COMPLICATION (HCC): ICD-10-CM

## 2018-05-31 LAB
ALBUMIN SERPL BCP-MCNC: 4.4 G/DL (ref 3.2–4.9)
ALBUMIN/GLOB SERPL: 1.8 G/DL
ALP SERPL-CCNC: 63 U/L (ref 30–99)
ALT SERPL-CCNC: 29 U/L (ref 2–50)
ANION GAP SERPL CALC-SCNC: 9 MMOL/L (ref 0–11.9)
AST SERPL-CCNC: 20 U/L (ref 12–45)
BILIRUB SERPL-MCNC: 0.9 MG/DL (ref 0.1–1.5)
BUN SERPL-MCNC: 17 MG/DL (ref 8–22)
CALCIUM SERPL-MCNC: 9.4 MG/DL (ref 8.5–10.5)
CHLORIDE SERPL-SCNC: 104 MMOL/L (ref 96–112)
CO2 SERPL-SCNC: 28 MMOL/L (ref 20–33)
CREAT SERPL-MCNC: 0.96 MG/DL (ref 0.5–1.4)
CREAT UR-MCNC: 205.5 MG/DL
EST. AVERAGE GLUCOSE BLD GHB EST-MCNC: 189 MG/DL
GLOBULIN SER CALC-MCNC: 2.5 G/DL (ref 1.9–3.5)
GLUCOSE SERPL-MCNC: 174 MG/DL (ref 65–99)
HBA1C MFR BLD: 8.2 % (ref 0–5.6)
MICROALBUMIN UR-MCNC: <0.7 MG/DL
MICROALBUMIN/CREAT UR: NORMAL MG/G (ref 0–30)
POTASSIUM SERPL-SCNC: 3.9 MMOL/L (ref 3.6–5.5)
PROT SERPL-MCNC: 6.9 G/DL (ref 6–8.2)
SODIUM SERPL-SCNC: 141 MMOL/L (ref 135–145)

## 2018-05-31 PROCEDURE — 82570 ASSAY OF URINE CREATININE: CPT

## 2018-05-31 PROCEDURE — 80053 COMPREHEN METABOLIC PANEL: CPT

## 2018-05-31 PROCEDURE — 83036 HEMOGLOBIN GLYCOSYLATED A1C: CPT

## 2018-05-31 PROCEDURE — 36415 COLL VENOUS BLD VENIPUNCTURE: CPT

## 2018-05-31 PROCEDURE — 82043 UR ALBUMIN QUANTITATIVE: CPT

## 2018-06-05 ENCOUNTER — OFFICE VISIT (OUTPATIENT)
Dept: MEDICAL GROUP | Facility: PHYSICIAN GROUP | Age: 48
End: 2018-06-05
Payer: COMMERCIAL

## 2018-06-05 VITALS
SYSTOLIC BLOOD PRESSURE: 122 MMHG | RESPIRATION RATE: 14 BRPM | WEIGHT: 224 LBS | BODY MASS INDEX: 27.85 KG/M2 | OXYGEN SATURATION: 94 % | HEIGHT: 75 IN | HEART RATE: 81 BPM | DIASTOLIC BLOOD PRESSURE: 70 MMHG | TEMPERATURE: 98.4 F

## 2018-06-05 DIAGNOSIS — E11.9 TYPE 2 DIABETES MELLITUS WITHOUT COMPLICATION, UNSPECIFIED WHETHER LONG TERM INSULIN USE (HCC): ICD-10-CM

## 2018-06-05 DIAGNOSIS — E78.2 MIXED HYPERLIPIDEMIA: ICD-10-CM

## 2018-06-05 DIAGNOSIS — Z86.718 HISTORY OF DVT (DEEP VEIN THROMBOSIS): ICD-10-CM

## 2018-06-05 DIAGNOSIS — R20.0 ARM NUMBNESS LEFT: ICD-10-CM

## 2018-06-05 DIAGNOSIS — M54.2 NECK PAIN: ICD-10-CM

## 2018-06-05 DIAGNOSIS — L81.9 ATYPICAL PIGMENTED SKIN LESION: ICD-10-CM

## 2018-06-05 PROCEDURE — 99214 OFFICE O/P EST MOD 30 MIN: CPT | Performed by: INTERNAL MEDICINE

## 2018-06-06 NOTE — ASSESSMENT & PLAN NOTE
He has atypical lesion on the left shoulder. Slight irregular border. Family history positive for skin cancer.

## 2018-06-06 NOTE — ASSESSMENT & PLAN NOTE
History of DVTs/PE. Mother with factor V deficiency. He is also diagnosed with factor V Leyden deficiency. He is on xarelto. Continue to monitor. No side effects  He is hesitant to be on asa and xarelto, and I do understand the concern. Consider hematology referral at some point

## 2018-06-06 NOTE — ASSESSMENT & PLAN NOTE
Better. It has dropped from 9.2--> 8.4. He is hesitant to injection. Diet is still a problem. Sometimes he has candies at work, and fatty food. He is trying to modify his diet. He is regular taking medication at this time. He is on metformin 1000 mg BID and glipizide 10 mg bid. He is ok to start ARB. He has had some side effects from lisinopril. Cannot recall what side effects   Monofilament testing with a 10 gram force: sensation intact: intact bilaterally  Visual Inspection: Feet without maceration, ulcers, fissures.  Pedal pulses: intact bilaterally

## 2018-06-06 NOTE — PROGRESS NOTES
Subjective:   José Bray is a 47 y.o. male here today for shoulder pain, lab work review, diabetes management, skin lesion     History of DVT (deep vein thrombosis)  History of DVTs/PE. Mother with factor V deficiency. He is also diagnosed with factor V Leyden deficiency. He is on xarelto. Continue to monitor. No side effects  He is hesitant to be on asa and xarelto, and I do understand the concern. Consider hematology referral at some point     Type 2 diabetes mellitus without complication  Better. It has dropped from 9.2--> 8.4. He is hesitant to injection. Diet is still a problem. Sometimes he has candies at work, and fatty food. He is trying to modify his diet. He is regular taking medication at this time. He is on metformin 1000 mg BID and glipizide 10 mg bid. He is ok to start ARB. He has had some side effects from lisinopril. Cannot recall what side effects   Monofilament testing with a 10 gram force: sensation intact: intact bilaterally  Visual Inspection: Feet without maceration, ulcers, fissures.  Pedal pulses: intact bilaterally      Arm numbness left  See previous note. He reports numbness from shoulder, lower on the left arm. Feels as his arm is falling a sleep. No trauma. It is been going on for two weeks. He has some tenderness on left lower scapula. Range of motion intac. He has neck tenderness too. I did order MRI of his neck and shoulder.   MRI of neck showed 05/23/2018  At C5-6 there is mild central canal stenosis, mild right and moderate to severe left neural foraminal stenosis secondary to disc bulge and endplate spurring    MRI of shoulder: 05/2018  1.  Tear of the posterior glenoid labrum which extends from the mid posterior glenoid labrum into the posterior/inferior and inferior glenoid labrum. This a tiny posteriorly located paralabral cyst.    2.  Tendinopathy involving the supraspinatus tendon with mild fraying of the bursal surface fibers.    3.  Tendinopathy and mild fraying of  the articular surface fibers of the infraspinatus and subscapularis tendons.    4.  Degenerative change of the acromioclavicular joint with small inferiorly directed osteophytes.      Atypical pigmented skin lesion  He has atypical lesion on the left shoulder. Slight irregular border. Family history positive for skin cancer.     Mixed hyperlipidemia  LDL elevated. He refuses statin, he would like to continue healthy lifestyle management        Current medicines (including changes today)  Current Outpatient Prescriptions   Medication Sig Dispense Refill   • BENFOTIAMINE PO Take  by mouth.     • sitagliptan-metformin (JANUMET)  MG per tablet Take 1 Tab by mouth 2 times a day, with meals. 180 Tab 3   • Multiple Vitamins-Minerals (MULTIVITAMIN ADULT PO) Take  by mouth.     • Cholecalciferol (VITAMIN D3) 2000 units Tab Take  by mouth.     • glipiZIDE (GLUCOTROL) 10 MG Tab Take 1 Tab by mouth 2 times a day. 180 Tab 3   • rivaroxaban (XARELTO) 20 MG Tab tablet Take 1 Tab by mouth with dinner. 30 Tab 5   • B Complex Vitamins (B COMPLEX PO) Take  by mouth.     • BIOTIN PO Take 10,000 mg by mouth.     • ascorbic acid (ASCORBIC ACID) 500 MG Tab Take 1,000 mg by mouth every day.       No current facility-administered medications for this visit.      He  has a past medical history of Diabetes ([facility]); Factor V deficiency (HCC); and Hyperlipidemia.    Current Outpatient Prescriptions   Medication Sig Dispense Refill   • BENFOTIAMINE PO Take  by mouth.     • sitagliptan-metformin (JANUMET)  MG per tablet Take 1 Tab by mouth 2 times a day, with meals. 180 Tab 3   • Multiple Vitamins-Minerals (MULTIVITAMIN ADULT PO) Take  by mouth.     • Cholecalciferol (VITAMIN D3) 2000 units Tab Take  by mouth.     • glipiZIDE (GLUCOTROL) 10 MG Tab Take 1 Tab by mouth 2 times a day. 180 Tab 3   • rivaroxaban (XARELTO) 20 MG Tab tablet Take 1 Tab by mouth with dinner. 30 Tab 5   • B Complex Vitamins (B COMPLEX PO) Take  by mouth.     •  "BIOTIN PO Take 10,000 mg by mouth.     • ascorbic acid (ASCORBIC ACID) 500 MG Tab Take 1,000 mg by mouth every day.       No current facility-administered medications for this visit.        Allergies as of 06/05/2018 - Reviewed 06/05/2018   Allergen Reaction Noted   • Yellow dye  09/25/2015       Social History     Social History   • Marital status:      Spouse name: N/A   • Number of children: N/A   • Years of education: N/A     Occupational History   • Not on file.     Social History Main Topics   • Smoking status: Former Smoker     Quit date: 11/3/2001   • Smokeless tobacco: Former User     Quit date: 3/1/2011   • Alcohol use No      Comment: drinks non-alcoholic beer   • Drug use: No   • Sexual activity: Yes     Partners: Female      Comment: daughter      Other Topics Concern   • Not on file     Social History Narrative   • No narrative on file        Family History   Problem Relation Age of Onset   • Cancer Mother      melanoma   • Heart Disease Father 61     MI, stent   • Diabetes Maternal Grandmother    • Hyperlipidemia Paternal Grandmother    • Heart Disease Paternal Grandfather      valve problems   • Alcohol/Drug Neg Hx        Past Surgical History:   Procedure Laterality Date   • TONSILLECTOMY         ROS   All systems reviewed are negative except for HPI       Objective:     Blood pressure 122/70, pulse 81, temperature 36.9 °C (98.4 °F), resp. rate 14, height 1.892 m (6' 2.5\"), weight 101.6 kg (224 lb), SpO2 94 %. Body mass index is 28.38 kg/m².   Physical Exam:  Constitutional: Alert, no distress.  Skin: Warm, dry, good turgor, as per above   Eye: Equal, round and reactive, conjunctiva clear, lids normal.  ENMT: Lips without lesions,   Respiratory: Unlabored respiratory effort,   Psych: Alert and oriented x3, normal affect and mood.  Monofilament testing with a 10 gram force: sensation intact: intact bilaterally  Visual Inspection: Feet without maceration, ulcers, fissures.  Pedal pulses: intact " bilaterally      Assessment and Plan:   The following treatment plan was discussed    1. Arm numbness left  2. Neck pain  Possible due from neck problem, but he has tendin problems too with his shoulder. I will start evaluation with spine surgery. consider physical therapy. Consider orthopedic.   - REFERRAL TO SPINE SURGERY    3. Type 2 diabetes mellitus without complication, unspecified whether long term insulin use (HCC)  Better, but still not perfect control. I will start janumet. Consider to add Rhulicort if still not better.   Diabetic monofilament today   Repeat lab work in three month  Losartan at next apt   Consider statin another time.   - sitagliptan-metformin (JANUMET)  MG per tablet; Take 1 Tab by mouth 2 times a day, with meals.  Dispense: 180 Tab; Refill: 3  - Diabetic Monofilament Lower Extremity Exam  - COMP METABOLIC PANEL; Future  - HEMOGLOBIN A1C; Future  - LIPID PROFILE; Future    4. Mixed hyperlipidemia  Continue to monitor, continue healthy lifestyle management at this time.   - COMP METABOLIC PANEL; Future  - LIPID PROFILE; Future    5. Atypical pigmented skin lesion  Follow up with dermatology   - REFERRAL TO DERMATOLOGY    6. History of DVT (deep vein thrombosis)  Continue same treatment,     Total 34 minutes face-to-face time spent with patient, with greater than 50% of the total time discussing patient's issues and symptoms as listed above in assessment and plan, as well as managing coordination of care for future evaluation and treatment.    Followup: Return in about 3 months (around 9/5/2018), or if symptoms worsen or fail to improve, for Short.

## 2018-06-06 NOTE — ASSESSMENT & PLAN NOTE
See previous note. He reports numbness from shoulder, lower on the left arm. Feels as his arm is falling a sleep. No trauma. It is been going on for two weeks. He has some tenderness on left lower scapula. Range of motion intac. He has neck tenderness too. I did order MRI of his neck and shoulder.   MRI of neck showed 05/23/2018  At C5-6 there is mild central canal stenosis, mild right and moderate to severe left neural foraminal stenosis secondary to disc bulge and endplate spurring    MRI of shoulder: 05/2018  1.  Tear of the posterior glenoid labrum which extends from the mid posterior glenoid labrum into the posterior/inferior and inferior glenoid labrum. This a tiny posteriorly located paralabral cyst.    2.  Tendinopathy involving the supraspinatus tendon with mild fraying of the bursal surface fibers.    3.  Tendinopathy and mild fraying of the articular surface fibers of the infraspinatus and subscapularis tendons.    4.  Degenerative change of the acromioclavicular joint with small inferiorly directed osteophytes.

## 2018-07-02 ENCOUNTER — APPOINTMENT (RX ONLY)
Dept: URBAN - METROPOLITAN AREA CLINIC 4 | Facility: CLINIC | Age: 48
Setting detail: DERMATOLOGY
End: 2018-07-02

## 2018-07-02 DIAGNOSIS — L82.1 OTHER SEBORRHEIC KERATOSIS: ICD-10-CM

## 2018-07-02 DIAGNOSIS — L81.4 OTHER MELANIN HYPERPIGMENTATION: ICD-10-CM

## 2018-07-02 DIAGNOSIS — D22 MELANOCYTIC NEVI: ICD-10-CM

## 2018-07-02 DIAGNOSIS — Z71.89 OTHER SPECIFIED COUNSELING: ICD-10-CM

## 2018-07-02 DIAGNOSIS — D18.0 HEMANGIOMA: ICD-10-CM

## 2018-07-02 PROBLEM — D22.62 MELANOCYTIC NEVI OF LEFT UPPER LIMB, INCLUDING SHOULDER: Status: ACTIVE | Noted: 2018-07-02

## 2018-07-02 PROBLEM — D48.5 NEOPLASM OF UNCERTAIN BEHAVIOR OF SKIN: Status: ACTIVE | Noted: 2018-07-02

## 2018-07-02 PROBLEM — D22.61 MELANOCYTIC NEVI OF RIGHT UPPER LIMB, INCLUDING SHOULDER: Status: ACTIVE | Noted: 2018-07-02

## 2018-07-02 PROBLEM — D18.01 HEMANGIOMA OF SKIN AND SUBCUTANEOUS TISSUE: Status: ACTIVE | Noted: 2018-07-02

## 2018-07-02 PROBLEM — D22.5 MELANOCYTIC NEVI OF TRUNK: Status: ACTIVE | Noted: 2018-07-02

## 2018-07-02 PROCEDURE — 99203 OFFICE O/P NEW LOW 30 MIN: CPT | Mod: 25

## 2018-07-02 PROCEDURE — ? BIOPSY BY SHAVE METHOD

## 2018-07-02 PROCEDURE — 11100: CPT

## 2018-07-02 PROCEDURE — ? COUNSELING

## 2018-07-02 ASSESSMENT — LOCATION SIMPLE DESCRIPTION DERM
LOCATION SIMPLE: RIGHT UPPER BACK
LOCATION SIMPLE: LEFT FOREHEAD
LOCATION SIMPLE: CHEST
LOCATION SIMPLE: UPPER BACK
LOCATION SIMPLE: LEFT SHOULDER
LOCATION SIMPLE: LEFT UPPER ARM
LOCATION SIMPLE: RIGHT FOREARM
LOCATION SIMPLE: ABDOMEN
LOCATION SIMPLE: RIGHT UPPER ARM
LOCATION SIMPLE: LEFT FOREARM

## 2018-07-02 ASSESSMENT — LOCATION DETAILED DESCRIPTION DERM
LOCATION DETAILED: LEFT ANTERIOR SHOULDER
LOCATION DETAILED: RIGHT DISTAL POSTERIOR UPPER ARM
LOCATION DETAILED: LEFT MEDIAL INFERIOR CHEST
LOCATION DETAILED: RIGHT PROXIMAL DORSAL FOREARM
LOCATION DETAILED: LEFT DISTAL POSTERIOR UPPER ARM
LOCATION DETAILED: RIGHT SUPERIOR MEDIAL UPPER BACK
LOCATION DETAILED: LEFT PROXIMAL DORSAL FOREARM
LOCATION DETAILED: INFERIOR THORACIC SPINE
LOCATION DETAILED: EPIGASTRIC SKIN
LOCATION DETAILED: LEFT MEDIAL FOREHEAD
LOCATION DETAILED: RIGHT SUPERIOR UPPER BACK

## 2018-07-02 ASSESSMENT — LOCATION ZONE DERM
LOCATION ZONE: ARM
LOCATION ZONE: FACE
LOCATION ZONE: TRUNK

## 2018-07-02 NOTE — HPI: SKIN LESION
Is This A New Presentation, Or A Follow-Up?: Mole
How Severe Is Your Skin Lesion?: mild
Has Your Skin Lesion Been Treated?: not been treated
Which Family Member (Optional)?: Mother

## 2018-07-02 NOTE — PROCEDURE: BIOPSY BY SHAVE METHOD
Bill 77014 For Specimen Handling/Conveyance To Laboratory?: no
Lab: [id number]
Electrodesiccation And Curettage Text: The wound bed was treated with electrodesiccation and curettage after the biopsy was performed.
Biopsy Type: H and E
Additional Anesthesia Volume In Cc (Will Not Render If 0): 0
Render Post-Care Instructions In Note?: yes
Anesthesia Volume In Cc: 1
Billing Type: Third-Party Bill
Anesthesia Type: 1% lidocaine with 1:100,000 epinephrine and 408mcg clindamycin/ml and a 1:10 solution of 8.4% sodium bicarbonate
Depth Of Biopsy: dermis
Notification Instructions: Patient will be notified of biopsy results. However, patient instructed to call the office if not contacted within 2 weeks.
Cryotherapy Text: The wound bed was treated with cryotherapy after the biopsy was performed.
Electrodesiccation Text: The wound bed was treated with electrodesiccation after the biopsy was performed.
Type Of Destruction Used: Curettage
Post-Care Instructions: I reviewed with the patient in detail post-care instructions. Patient is to keep the biopsy site dry overnight, and then apply bacitracin twice daily until healed. Patient may apply hydrogen peroxide soaks to remove any crusting.
Dressing: bandage
Consent: Written consent was obtained and risks were reviewed including but not limited to scarring, infection, bleeding, scabbing, incomplete removal, nerve damage and allergy to anesthesia.
Detail Level: Detailed
Silver Nitrate Text: The wound bed was treated with silver nitrate after the biopsy was performed.
Biopsy Method: Personna blade
Lab Facility: 
Wound Care: Vaseline
Hemostasis: Drysol
Curettage Text: The wound bed was treated with curettage after the biopsy was performed.

## 2018-08-28 ENCOUNTER — HOSPITAL ENCOUNTER (OUTPATIENT)
Dept: LAB | Facility: MEDICAL CENTER | Age: 48
End: 2018-08-28
Attending: INTERNAL MEDICINE
Payer: COMMERCIAL

## 2018-08-28 DIAGNOSIS — E78.2 MIXED HYPERLIPIDEMIA: ICD-10-CM

## 2018-08-28 DIAGNOSIS — E11.9 TYPE 2 DIABETES MELLITUS WITHOUT COMPLICATION, UNSPECIFIED WHETHER LONG TERM INSULIN USE (HCC): ICD-10-CM

## 2018-08-28 LAB
ALBUMIN SERPL BCP-MCNC: 4.7 G/DL (ref 3.2–4.9)
ALBUMIN/GLOB SERPL: 2 G/DL
ALP SERPL-CCNC: 53 U/L (ref 30–99)
ALT SERPL-CCNC: 21 U/L (ref 2–50)
ANION GAP SERPL CALC-SCNC: 11 MMOL/L (ref 0–11.9)
AST SERPL-CCNC: 20 U/L (ref 12–45)
BILIRUB SERPL-MCNC: 1.1 MG/DL (ref 0.1–1.5)
BUN SERPL-MCNC: 16 MG/DL (ref 8–22)
CALCIUM SERPL-MCNC: 9.4 MG/DL (ref 8.5–10.5)
CHLORIDE SERPL-SCNC: 103 MMOL/L (ref 96–112)
CHOLEST SERPL-MCNC: 199 MG/DL (ref 100–199)
CO2 SERPL-SCNC: 26 MMOL/L (ref 20–33)
CREAT SERPL-MCNC: 1.1 MG/DL (ref 0.5–1.4)
EST. AVERAGE GLUCOSE BLD GHB EST-MCNC: 120 MG/DL
GLOBULIN SER CALC-MCNC: 2.3 G/DL (ref 1.9–3.5)
GLUCOSE SERPL-MCNC: 97 MG/DL (ref 65–99)
HBA1C MFR BLD: 5.8 % (ref 0–5.6)
HDLC SERPL-MCNC: 42 MG/DL
LDLC SERPL CALC-MCNC: 138 MG/DL
POTASSIUM SERPL-SCNC: 3.7 MMOL/L (ref 3.6–5.5)
PROT SERPL-MCNC: 7 G/DL (ref 6–8.2)
SODIUM SERPL-SCNC: 140 MMOL/L (ref 135–145)
TRIGL SERPL-MCNC: 94 MG/DL (ref 0–149)

## 2018-08-28 PROCEDURE — 83036 HEMOGLOBIN GLYCOSYLATED A1C: CPT

## 2018-08-28 PROCEDURE — 80053 COMPREHEN METABOLIC PANEL: CPT

## 2018-08-28 PROCEDURE — 36415 COLL VENOUS BLD VENIPUNCTURE: CPT

## 2018-08-28 PROCEDURE — 80061 LIPID PANEL: CPT

## 2018-09-07 ENCOUNTER — OFFICE VISIT (OUTPATIENT)
Dept: MEDICAL GROUP | Facility: PHYSICIAN GROUP | Age: 48
End: 2018-09-07
Payer: COMMERCIAL

## 2018-09-07 VITALS
WEIGHT: 211.8 LBS | HEIGHT: 74 IN | DIASTOLIC BLOOD PRESSURE: 82 MMHG | BODY MASS INDEX: 27.18 KG/M2 | OXYGEN SATURATION: 94 % | SYSTOLIC BLOOD PRESSURE: 110 MMHG | RESPIRATION RATE: 158 BRPM | TEMPERATURE: 98.2 F | HEART RATE: 70 BPM

## 2018-09-07 DIAGNOSIS — S91.109S OPEN WOUND OF TOE, SEQUELA: ICD-10-CM

## 2018-09-07 DIAGNOSIS — E78.2 MIXED HYPERLIPIDEMIA: ICD-10-CM

## 2018-09-07 DIAGNOSIS — E11.9 TYPE 2 DIABETES MELLITUS WITHOUT COMPLICATION, UNSPECIFIED WHETHER LONG TERM INSULIN USE (HCC): ICD-10-CM

## 2018-09-07 PROBLEM — S91.109A OPEN TOE WOUND: Status: ACTIVE | Noted: 2018-09-07

## 2018-09-07 PROCEDURE — 99215 OFFICE O/P EST HI 40 MIN: CPT | Performed by: INTERNAL MEDICINE

## 2018-09-07 NOTE — PROGRESS NOTES
RN-FATOUE Note    Subjective:     Health changes since last visit/interval Hx: Reports 6 or more hypoglycemic events since last appointment    Medications (including changes made today)  Current Outpatient Prescriptions   Medication Sig Dispense Refill   • BENFOTIAMINE PO Take  by mouth.     • sitagliptan-metformin (JANUMET)  MG per tablet Take 1 Tab by mouth 2 times a day, with meals. 180 Tab 3   • Multiple Vitamins-Minerals (MULTIVITAMIN ADULT PO) Take  by mouth.     • Cholecalciferol (VITAMIN D3) 2000 units Tab Take  by mouth.     • glipiZIDE (GLUCOTROL) 10 MG Tab Take 1 Tab by mouth 2 times a day. 180 Tab 3   • rivaroxaban (XARELTO) 20 MG Tab tablet Take 1 Tab by mouth with dinner. 30 Tab 5   • B Complex Vitamins (B COMPLEX PO) Take  by mouth.     • BIOTIN PO Take 10,000 mg by mouth.     • ascorbic acid (ASCORBIC ACID) 500 MG Tab Take 1,000 mg by mouth every day.       No current facility-administered medications for this visit.        Taking daily ASA: No  Taking above medications as prescribed: yes  SIDE EFFECTS: Patient reports the following side effects: hypoglycemia    Exercise: running, cycling, hiking, yoga  Diet: meals per day on average: 3, snacks in the evening  Patient's body mass index is unknown because there is no height or weight on file. Exercise and nutrition counseling were performed at this visit.      Health Maintenance:   Health Maintenance Due   Topic Date Due   • IMM INFLUENZA (1) 09/01/2018       Immunizations:   PPSV23: Up-to-date  Npxtinc02: unknown  Tdap: Up-to-date  Flu: Due  Hep B: Up-to-date    DM:   Last A1c:   Lab Results   Component Value Date/Time    HBA1C 5.8 (H) 08/28/2018 06:54 AM      A1C GOAL: < 7    Glucose monitoring frequency: none    Hypoglycemic episodes: yes - weekly    Last Retinal Exam: on file and up-to-date  Daily Foot Exam: Yes blister on both feet from hiking  Routine Dental Exams: Yes    Lab Results   Component Value Date/Time    MICROALBCALC 3.4 11/28/2016  08:13 AM    MALBCRT see below 05/31/2018 06:18 AM    MICROALBUR <0.7 05/31/2018 06:18 AM    MICRALB 9.0 11/28/2016 08:13 AM        ACR Albumin/Creatinine Ratio goal <30     HTN:   Blood pressure goal <140/<80 yes.   Currently Rx ACE/ARB: No    Dyslipidemia:    Lab Results   Component Value Date/Time    CHOLSTRLTOT 199 08/28/2018 06:54 AM     (H) 08/28/2018 06:54 AM    HDL 42 08/28/2018 06:54 AM    TRIGLYCERIDE 94 08/28/2018 06:54 AM       Lab Results   Component Value Date/Time    SODIUM 140 08/28/2018 06:54 AM    POTASSIUM 3.7 08/28/2018 06:54 AM    CHLORIDE 103 08/28/2018 06:54 AM    CO2 26 08/28/2018 06:54 AM    GLUCOSE 97 08/28/2018 06:54 AM    BUN 16 08/28/2018 06:54 AM    CREATININE 1.10 08/28/2018 06:54 AM    BUNCREATRAT 12 11/28/2016 08:13 AM     Lab Results   Component Value Date/Time    ALKPHOSPHAT 53 08/28/2018 06:54 AM    ASTSGOT 20 08/28/2018 06:54 AM    ALTSGPT 21 08/28/2018 06:54 AM    TBILIRUBIN 1.1 08/28/2018 06:54 AM        Currently Rx Statin: No    He  reports that he quit smoking about 16 years ago. He quit smokeless tobacco use about 7 years ago.    Objective:     Exam:  Monofilament: not done    Plan:     Discussed and educated on:   - All medications, side effects and compliance (discussed carefully)  - Annual eye examinations at Ophthalmology  - Diabetic Meal Plan: plate method  - Foot Care: what to look for when checking feet every day and when to contact HCP  - HbA1C: target and what A1C is  - Home glucose monitoring emphasized  - Hypoglycemia: factors causing, signs/symptoms and proper treatment  - Weight control and daily exercise    Recommended medication changes: stop janumet, start Trulicity and metformin, decrease glipizide by half, then discontinue with second dose of Tulicity

## 2018-09-07 NOTE — PROGRESS NOTES
Subjective:     HPI    Chief Complaint   Patient presents with   • Diabetes       José Bray is a 47 y.o. male who presents for follow up of chronic conditions of diabetes mellitus, hypertension, hyperlipidemia    RN-CDE notes reviewed including HPI for DM, HTN, Hyperlipidemia.  Exercise frequency and limitations reviewed.  Feet symptoms reviewed.  Nutritional status reviewed.  Retinal eye exam history reviewed.    Patient additionally reports:    Type 2 diabetes mellitus without complication  He is doing significantly better. A1c 9.4---> 8.4---> 5.8. He is getting lot of low sugars reading < 70 with glipizide. He is currently on janumet  BID, and glipizide. He is avoiding candies, he had only on coca cola the other day. He is exercising. He is hiking. No complication. He is up to date with test.     Open toe wound  Left first toe small wound from hiking, he also had a blister on the right foot. No signs of infection, he is doing peroxide, keeping clean. No fever.     Mixed hyperlipidemia  Doing better. Triglycerides are better, HDL is better. Continue healthy lifestyle. Still early for statin. We will start statin at some point for primary prevention, since pt is diabetic       He indicates that he is feeling well and denies any symptoms referable to the above diagnoses. Specifically denies chest pain, palpitations, dyspnea, orthopnea, PND or peripheral edema. Also denies polyuria, polydipsia, urinary complaints, abdominal complaints, myalgias, numbness, weakness or other related symptoms.     Patient Active Problem List    Diagnosis Date Noted   • Open toe wound 09/07/2018   • Atypical pigmented skin lesion 06/05/2018   • Arm numbness left 05/11/2018   • History of DVT (deep vein thrombosis) 04/03/2018   • Mixed hyperlipidemia 04/03/2018   • Factor V Leiden (HCC) 09/25/2015   • Chronic anticoagulation 09/25/2015   • Type 2 diabetes mellitus without complication ([facility]) 09/25/2015   • Neck  pain 09/25/2015       Health Maintenance/Immunizations:   Health Maintenance Topics with due status: Overdue       Topic Date Due    IMM INFLUENZA 09/01/2018       Current medications including changes today:  Current Outpatient Prescriptions   Medication Sig Dispense Refill   • Dulaglutide (TRULICITY) 0.75 MG/0.5ML Solution Pen-injector Inject 0.75 mg as instructed every 7 days. 4 PEN 11   • metformin (GLUCOPHAGE) 1000 MG tablet Take 1 Tab by mouth 2 times a day, with meals. 60 Tab 11   • Blood Glucose Monitoring Suppl Supplies Misc Test strips order: Contour NEXT, test once daily and with symptoms of hypoglcemia 100 Strip 3   • BENFOTIAMINE PO Take  by mouth.     • Multiple Vitamins-Minerals (MULTIVITAMIN ADULT PO) Take  by mouth.     • Cholecalciferol (VITAMIN D3) 2000 units Tab Take  by mouth.     • glipiZIDE (GLUCOTROL) 10 MG Tab Take 1 Tab by mouth 2 times a day. 180 Tab 3   • rivaroxaban (XARELTO) 20 MG Tab tablet Take 1 Tab by mouth with dinner. 30 Tab 5   • B Complex Vitamins (B COMPLEX PO) Take  by mouth.     • BIOTIN PO Take 10,000 mg by mouth.     • ascorbic acid (ASCORBIC ACID) 500 MG Tab Take 1,000 mg by mouth every day.       No current facility-administered medications for this visit.        Allergies:   Allergies   Allergen Reactions   • Yellow Dye         Social History   Substance Use Topics   • Smoking status: Former Smoker     Quit date: 11/3/2001   • Smokeless tobacco: Former User     Quit date: 3/1/2011   • Alcohol use No      Comment: drinks non-alcoholic beer       Family History   Problem Relation Age of Onset   • Cancer Mother         melanoma   • Heart Disease Father 61        MI, stent   • Diabetes Maternal Grandmother    • Hyperlipidemia Paternal Grandmother    • Heart Disease Paternal Grandfather         valve problems   • Alcohol/Drug Neg Hx        ROS  Pertinent ROS findings as above.   All other systems reviewed and are negative except as per HPI.     Objective:     /82    "Pulse 70   Temp 36.8 °C (98.2 °F)   Resp (!) 158   Ht 1.88 m (6' 2\")   Wt 96.1 kg (211 lb 12.8 oz)   SpO2 94%   BMI 27.19 kg/m²  Body mass index is 27.19 kg/m².     Alert, oriented in no acute distress.  Eye contact is good, speech goal directed, affect calm.  HEENT: EOMI, PERRL, conjunctiva non-injected, sclera non-icteric.  Nares patent with no significant congestion or drainage.  Shona pinnae, external auditory canals,   Oral mucous membranes pink and moist with no lesions or thrush.  Neck supple with no cervical lymphadenopathy, JVD, palpable thyroid nodules or carotid bruits.  Lungs: clear to auscultation bilaterally with good excursion.  CV: regular rate and rhythm.  Abdomen soft, non-distended, non-tender with normal bowel sounds. No CVAT  Lower extremities warm with no edema.  Feet are examined , wound as per above, no signs of infection     Lab Results   Component Value Date/Time    HBA1C 5.8 (H) 08/28/2018 06:54 AM        Assessment/Plan:     1. Type 2 diabetes mellitus without complication, unspecified whether long term insulin use (HCC)  Dulaglutide (TRULICITY) 0.75 MG/0.5ML Solution Pen-injector    metformin (GLUCOPHAGE) 1000 MG tablet    Blood Glucose Monitoring Suppl Supplies Misc   2. Open wound of toe, sequela     3. Mixed hyperlipidemia         DM2 A1c is at goal     -RN-CDE notes reviewed and discussed.  -Patient's body mass index is 27.19 kg/m². Exercise and nutrition counseling were performed at this visit.   Additionally discussed disease management and weight loss goals.   -Education and advice provided today: See RN-CDE note.        1. Type 2 diabetes mellitus without complication, unspecified whether long term insulin use (HCC)    Additional education, advice, refills, and referrals per order  As per above. We will stop janumet, start trulicity. Wean off glipizide. Consider to add ARB at next apt   At some point he needs to be started on statin. Continue to monitor. Continue healthy " lifestyle   - Dulaglutide (TRULICITY) 0.75 MG/0.5ML Solution Pen-injector; Inject 0.75 mg as instructed every 7 days.  Dispense: 4 PEN; Refill: 11  - metformin (GLUCOPHAGE) 1000 MG tablet; Take 1 Tab by mouth 2 times a day, with meals.  Dispense: 60 Tab; Refill: 11  - Blood Glucose Monitoring Suppl Supplies Misc; Test strips order: Contour NEXT, test once daily and with symptoms of hypoglcemia  Dispense: 100 Strip; Refill: 3    2. Open wound of toe, sequela  Dressing done. No need for antibiotic at this time. Continue to monitor. Follow up if worse     3. Mixed hyperlipidemia  Doing better. Continue to monitor       Follow-up:   Return in about 4 months (around 1/7/2019), or if symptoms worsen or fail to improve, for Short. sooner should new symptoms or problems arise.    The total time spent seeing the patient in consultation, and formulating an action plan for this visit was 40 minutes.  Greater than 50% of this time was spent counseling, discussing problems documented above, coordinating care and answering questions by the provider and registered nurse--certified diabetes educator.

## 2018-09-07 NOTE — ASSESSMENT & PLAN NOTE
Left first toe small wound from hiking, he also had a blister on the right foot. No signs of infection, he is doing peroxide, keeping clean. No fever.

## 2018-09-07 NOTE — ASSESSMENT & PLAN NOTE
Doing better. Triglycerides are better, HDL is better. Continue healthy lifestyle. Still early for statin. We will start statin at some point for primary prevention, since pt is diabetic

## 2018-09-07 NOTE — ASSESSMENT & PLAN NOTE
He is doing significantly better. A1c 9.4---> 8.4---> 5.8. He is getting lot of low sugars reading < 70 with glipizide. He is currently on janumet  BID, and glipizide. He is avoiding candies, he had only on coca cola the other day. He is exercising. He is hiking. No complication. He is up to date with test.

## 2018-09-13 ENCOUNTER — TELEPHONE (OUTPATIENT)
Dept: MEDICAL GROUP | Facility: PHYSICIAN GROUP | Age: 48
End: 2018-09-13

## 2018-09-13 RX ORDER — RIVAROXABAN 20 MG/1
TABLET, FILM COATED ORAL
Qty: 30 TAB | Refills: 11 | Status: SHIPPED | OUTPATIENT
Start: 2018-09-13 | End: 2019-06-03 | Stop reason: SDUPTHER

## 2018-09-13 NOTE — TELEPHONE ENCOUNTER
DOCUMENTATION OF PAR STATUS:    1. Name of Medication & Dose: Trulicity 0.75     2. Name of Prescription Coverage Company & phone #: [employer]    3. Date Prior Auth Submitted: 9/13/18    4. What information was given to obtain insurance decision? Office notes and labs     5. Prior Auth Status? Pending    6. Patient Notified: yes    PA scanned into media

## 2018-09-13 NOTE — TELEPHONE ENCOUNTER
----- Message from Yuridia Burciaga, Med Ass't sent at 9/12/2018  4:43 PM PDT -----  Regarding: FW: Prescription Question  Contact: [phone]  Rusty Zapien,  I don't see a PA in in chart... Do you know anything about this?  -Yuridia   ----- Message -----  From: José Bray  Sent: 9/12/2018   1:35 PM  To: Devan Rhoades Ma  Subject: Prescription Question                            Hi Dr. Milton,    I stopped by the pharmacy over the weekend and the new prescription was not filled due to insurance. I was told by the pharmacy that they faxed your office.     Did you have a chance to call the insurance company to clear things up?    I appreciate your help.    Kind regards,    José Bray

## 2018-09-19 ENCOUNTER — ANTICOAGULATION VISIT (OUTPATIENT)
Dept: VASCULAR LAB | Facility: MEDICAL CENTER | Age: 48
End: 2018-09-19
Attending: INTERNAL MEDICINE
Payer: COMMERCIAL

## 2018-09-19 DIAGNOSIS — D68.51 FACTOR V LEIDEN (HCC): ICD-10-CM

## 2018-09-19 LAB — INR PPP: 1 (ref 2–3.5)

## 2018-09-19 PROCEDURE — 85610 PROTHROMBIN TIME: CPT

## 2018-09-19 PROCEDURE — 99211 OFF/OP EST MAY X REQ PHY/QHP: CPT | Performed by: PHARMACIST

## 2018-09-19 NOTE — PROGRESS NOTES
Anticoagulation Summary  As of 9/19/2018    INR goal:   2.0-3.0   TTR:   34.3 % (3 y)   Today's INR:   1.0!   Warfarin maintenance plan:   No maintenance plan   Plan last modified:   Leatha Duff A.P.NJordyn (3/19/2018)   Next INR check:   3/20/2019   Target end date:   Indefinite    Indications    Pulmonary embolism (HCC) (Resolved) [I26.99]  Factor V Leiden (HCC) [D68.51]             Anticoagulation Episode Summary     INR check location:   [facility]    Preferred lab:       Send INR reminders to:       Comments:         Anticoagulation Care Providers     Provider Role Specialty Phone number    Edmundo Rao M.D. Referring Family Medicine [phone]    [facility] Responsible  [phone]    TITO Ash Responsible Cardiology [phone]        Anticoagulation Patient Findings         Target end date:Indefinite     Indication: Factor V Leiden, PE     Drug: Xarelto 20 mg     CHADsVASC = n/a    Health Status Since Last Assessment   Patient denies any new relevant medical problems, ED visits or hospitalizations   Patient denies any embolic events (stroke/tia/systemic embolism)    Adherence with DOAC Therapy   Pt has NO missed any doses in the average week    Bleeding Risk Assessment     Negative Epistaxis   Pt denies any excessive or unusual bleeding/hematomas.  Pt denies any GI bleeds or hematemesis.  Pt denies any concerning daily headache or sub dural hematoma symptoms.     Pt denies any hematuria or abnormal vaginal bleeding.   Latest Hemoglobin 15.7   ETOH overuse Negative     Creatinine Clearance/Renal Function     Latest ClCr >80 mL/min     Drug Interactions   Platelets: 200   ASA/other antiplatelets Negative   NSAID Negative   Other drug interactions Negative   X Verified no anticonvulsant or azole therapy, education provided for future use.     Examination   Blood Pressure 115/74  HR 81   Symptomatic hypotension Negative   Significant gait  impairment/imbalance/high risk for falls? Negative    Final Assessment and Recommendations:   Patient appears stable from the anticoagulation staindpoint.     Benefits of continued DOAC therapy outweigh risks for this patient   Recommend pt continue with current DOAC therapy Xarelto 20 mg one time daily (with dose adjustment)     Other Actions: cmp/ cbc hemogram ordered prior to next visit    Follow up:   Will follow up with patient via telephone in 6 months.  I have added this patient to my list for follow up.    Gerber Simmons, PharmD

## 2018-09-20 LAB — INR BLD: 1 (ref 0.9–1.2)

## 2018-10-18 ENCOUNTER — PHYSICAL THERAPY (OUTPATIENT)
Dept: PHYSICAL THERAPY | Facility: REHABILITATION | Age: 48
End: 2018-10-18
Attending: NEUROLOGICAL SURGERY
Payer: COMMERCIAL

## 2018-10-18 DIAGNOSIS — M48.02 CERVICAL SPINAL STENOSIS: ICD-10-CM

## 2018-10-18 PROCEDURE — 97162 PT EVAL MOD COMPLEX 30 MIN: CPT

## 2018-10-18 PROCEDURE — G8981 BODY POS CURRENT STATUS: HCPCS | Mod: CI

## 2018-10-18 PROCEDURE — 97140 MANUAL THERAPY 1/> REGIONS: CPT

## 2018-10-18 PROCEDURE — G8982 BODY POS GOAL STATUS: HCPCS | Mod: CI

## 2018-10-18 PROCEDURE — 97110 THERAPEUTIC EXERCISES: CPT

## 2018-10-18 ASSESSMENT — ENCOUNTER SYMPTOMS
PAIN SCALE: 5
PAIN SCALE AT LOWEST: 2
PAIN SCALE AT HIGHEST: 6

## 2018-10-18 NOTE — OP THERAPY EVALUATION
Outpatient Physical Therapy  INITIAL EVALUATION    [facility] [place] [address] [facility]  [place] [address]  Phone:  [phone]    Date of Evaluation: 10/18/2018    Patient: José Bray  YOB: 1970  MRN: [id number]     Referring Provider: Brett June M.D.  [id number]  [place] [address]   Referring Diagnosis Other cervical disc degeneration, unspecified cervical region [M50.30]     Time Calculation  Start time: 930  Stop time: 0 Time Calculation (min): 60 minutes     Physical Therapy Occurrence Codes    Date of onset of impairment:  18   Date physical therapy care plan established or reviewed:  10/18/18   Date physical therapy treatment started:  10/18/18          Chief Complaint: No chief complaint on file.    Visit Diagnoses     ICD-10-CM   1. Cervical spinal stenosis M48.02         Subjective:   History of Present Illness:     Mechanism of injury:  Chief complaint of Left neck pain and intermittent paraesthesia into L. Upper extremity, chronic, intensified in May 2018. Also has complaint of shoulder and scalene tension. Symptoms are reducing since starting hot yoga and doing more exercise for weight loss and strengthening (circuit training). Hx of spine injury in high school football that caused numbness both UE, made 100% recovery. Works as , being mindful of work posture and will be looking into standing desk.   Headaches:  no headaches  Pain:     Current pain ratin    At best pain ratin    At worst pain ratin    Location:  Numbness down bicep and into radial hand, lower to mid neck, and tension in both shoulders    Progression:  Improving      Past Medical History:   Diagnosis Date   • Diabetes (HCC)    • Factor V deficiency (HCC)    • Hyperlipidemia      Past Surgical History:   Procedure Laterality Date   • TONSILLECTOMY       Social History   Substance Use Topics   • Smoking status: Former Smoker     Quit date:  11/3/2001   • Smokeless tobacco: Former User     Quit date: 3/1/2011   • Alcohol use No      Comment: drinks non-alcoholic beer     Family and Occupational History     Social History   • Marital status:      Spouse name: N/A   • Number of children: N/A   • Years of education: N/A       Objective     Neurological Testing     Reflexes   Left   Biceps (C5/C6): absent (0)  Brachioradialis (C6): absent (0)  Triceps (C7): absent (0)    Right   Biceps (C5/C6): trace (1+)  Brachioradialis (C6): trace (1+)  Triceps (C7): trace (1+)    Myotome testing   Cervical (left)   All left cervical myotomes within normal limits    Cervical (right)   All right cervical myotomes within normal limits    Additional Neurological Details  Neural tension testing:    Median N. Restriction at thoracic outlet and wrist     Palpation   Left   Hypertonic in the levator scapulae, scalenes, sternocleidomastoid and upper trapezius.   Tenderness of the levator scapulae and scalenes.     Active Range of Motion     Cervical spine: All cervical active range of motion within functional limits    Passive Range of Motion     Cervical spine: All cervical passive range of motion within functional limits    Joint Play   Spine     Central PA [place]        T1: hypomobile with grade 2       T2: hypomobile with grade 2        Tests     Left Shoulder   Positive Spurling's sign and ULTT2.       Exercises/Treatment  Time-based treatments/modalities:  Manual therapy minutes (CPT 65271): 15 minutes  Therapeutic exercise minutes (CPT 36880): 10 minutes  Functional training, self care minutes (CPT 91032): 8 minutes       Assessment, Response and Plan:   Assessment details:  Mr. Bray is referred to PT for cervical stenosis causing a chief complaint of neck pain, neck and shoulder tension and paraesthesia into Median N. Distribution of L. Upper extremity. His symptoms have been reducing over the past one month since participating in Hot Yoga as well as  resistance training. He has also lost 12-15 lbs in the past 6 weeks. His condition is improving and I think further progress can be made with PT guidance.   Prognosis: good    Goals:   Short Term Goals:   Address sleep posture to improve quality and quantity of sleep  Education in appropriate work station ergonomics  50% improved neural tension testing L. Upper extremity   Demo independence for thoracic stretches to address hypomobility  Short term goal time span:  2-4 weeks      Long Term Goals:    Improved NDI score  Consistent reduction in myofascial tone of neck and shoulders  Able to perform self massage techniques to reduced neck and shoulder tension independently   Demo independence for HEP  Long term goal time span:  4-6 weeks    Plan:   Frequency:  2x week  Duration in weeks:  6  Discussed with:  Patient      Functional Limitation G-Codes and Severity Modifiers  PT Functional Assessment Tool Used: NDI  PT Functional Assessment Score: 12  Neck Disability Total: 12   Current: Body Position: Current (): CI   Goal: Body Position: Goal (): CI     Referring provider co-signature:  I have reviewed this plan of care and my co-signature certifies the need for services.  Certification Dates:   From 10/18/18     To 11/25/18    Physician Signature: ________________________________ Date: ______________

## 2018-10-29 ENCOUNTER — PHYSICAL THERAPY (OUTPATIENT)
Dept: PHYSICAL THERAPY | Facility: REHABILITATION | Age: 48
End: 2018-10-29
Attending: NEUROLOGICAL SURGERY
Payer: COMMERCIAL

## 2018-10-29 DIAGNOSIS — M48.02 CERVICAL SPINAL STENOSIS: ICD-10-CM

## 2018-10-29 PROCEDURE — 97110 THERAPEUTIC EXERCISES: CPT

## 2018-10-29 PROCEDURE — 97140 MANUAL THERAPY 1/> REGIONS: CPT

## 2018-10-29 NOTE — OP THERAPY DAILY TREATMENT
Outpatient Physical Therapy  DAILY TREATMENT     [facility] [place] [id number] [facility], [address]  [place] [address]  Phone:  [phone]    Date: 10/29/2018    Patient: José Bray  YOB: 1970  MRN: [id number]     Time Calculation    0730  0800 30 min     Chief Complaint: No chief complaint on file.    Visit #: 2    SUBJECTIVE:  My neck has not been bothering me much the past couple of weeks.     OBJECTIVE:  Current objective measures:           Therapeutic Exercises (CPT 17369):     1. Foam roll stretching    2. Median N. glide    3. Thread the needle stretch      Time-based treatments/modalities:      72105 There exercise 20 min   91027 Manual therapy 10 min duration       Pain rating before treatment: 0  Pain rating after treatment: 0    ASSESSMENT:   Response to treatment: Nerve tension reduced and very little myofascial tension in neck. Hypomobility in thoracic spine was present and upon review of hot yoga poses being performed that area of spine was not being address. Educated patient in thoracic stretches, show thoracic scoops exercise next visit.     PLAN/RECOMMENDATIONS:   Plan for treatment: therapy treatment to continue next visit.  Planned interventions for next visit: continue with current treatment.

## 2018-10-30 ENCOUNTER — TELEPHONE (OUTPATIENT)
Dept: MEDICAL GROUP | Facility: PHYSICIAN GROUP | Age: 48
End: 2018-10-30

## 2018-10-30 NOTE — TELEPHONE ENCOUNTER
FINAL PRIOR AUTHORIZATION STATUS:    1.  Name of Medication & Dose: Truliicity      2. Prior Auth Status: Approved through 4/25/19     3. Action Taken: Pharmacy Notified: yes Patient Notified: yes    Phone Number Called: [phone] (home) [phone] (work)    Message: Pt notified of medication approval. Notification of approval in media.     Left Message for patient to call back: N\A

## 2018-11-05 ENCOUNTER — PHYSICAL THERAPY (OUTPATIENT)
Dept: PHYSICAL THERAPY | Facility: REHABILITATION | Age: 48
End: 2018-11-05
Attending: NEUROLOGICAL SURGERY
Payer: COMMERCIAL

## 2018-11-05 DIAGNOSIS — M48.02 CERVICAL SPINAL STENOSIS: ICD-10-CM

## 2018-11-05 PROCEDURE — 97110 THERAPEUTIC EXERCISES: CPT

## 2018-11-05 PROCEDURE — 97535 SELF CARE MNGMENT TRAINING: CPT

## 2018-11-05 NOTE — OP THERAPY DISCHARGE SUMMARY
Outpatient Physical Therapy  DISCHARGE SUMMARY NOTE      [facility] [address], [address]  [place] [address]  Phone:  [phone]    Date of Visit: 11/05/2018    Patient: José Bray  YOB: 1970  MRN: [id number]     Referring Provider: Brett June M.D.  [address] [place] [address]   Referring Diagnosis Other cervical disc degeneration, unspecified cervical region [M50.30]     Physical Therapy Occurrence Codes    Date of onset of impairment:  4/18/18   Date physical therapy care plan established or reviewed:  10/18/18   Date physical therapy treatment started:  10/18/18          Functional Limitation G-Codes and Severity Modifiers      Goal:     Discharge:         Your patient is being discharged from Physical Therapy with the following comments:   · Goals met    Comments:  Patient referred to PT for cervical stenosis. Patient reporting no symptoms at this time associated with cervical spine.      Limitations Remaining:  Thoracic stiffness  Abnormal muscle tension in neck  Slight median N. Tension on R. UE near thoracic outlet  Decreased endurance thoracolumbar extension    Recommendations:  D/C to PT with self-care instructions provided     Arley Pete, PT    Date: 11/5/2018

## 2018-11-05 NOTE — OP THERAPY DAILY TREATMENT
Outpatient Physical Therapy  DAILY TREATMENT     [facility] Therapy [address], [address]  [place] [address]  Phone:  [phone]    Date: 11/05/2018    Patient: José Bray  YOB: 1970  MRN: [id number]     Time Calculation  Start time: 0738  Stop time: 0801 Time Calculation (min): 23 minutes     Chief Complaint: No chief complaint on file.    Visit #: 3    SUBJECTIVE:  Symptoms continue to be mild. Continuing with Hot Yoga and that is helping. Only complaint is minor stiffness in lower neck. Denies paraesthesia into upper extremity.     OBJECTIVE:  Current objective measures:  Mild N. Tension Median N. On L.   Normal Cervical AROM              Therapeutic Exercises (CPT 20338):     1. SH. flexion stretch on wall    2. Wall push ups    3. Thoracic scoops    4. Foam roll thoracic spine       Time-based treatments/modalities:  Therapeutic exercise minutes (CPT 50634): 13 minutes  Functional training, self care minutes (CPT 32321): 10 minutes       Pain rating before treatment: 0  Pain rating after treatment: 0    ASSESSMENT:   Response to treatment: Patients symptoms are low and being well managed independently. D/C from PT today.     PLAN/RECOMMENDATIONS:   Plan for treatment: discharge patient due to accomplished goals.

## 2018-11-12 ENCOUNTER — APPOINTMENT (OUTPATIENT)
Dept: PHYSICAL THERAPY | Facility: REHABILITATION | Age: 48
End: 2018-11-12
Attending: NEUROLOGICAL SURGERY
Payer: COMMERCIAL

## 2018-11-19 ENCOUNTER — APPOINTMENT (OUTPATIENT)
Dept: PHYSICAL THERAPY | Facility: REHABILITATION | Age: 48
End: 2018-11-19
Attending: NEUROLOGICAL SURGERY
Payer: COMMERCIAL

## 2018-11-21 ENCOUNTER — NON-PROVIDER VISIT (OUTPATIENT)
Dept: MEDICAL GROUP | Facility: PHYSICIAN GROUP | Age: 48
End: 2018-11-21
Payer: COMMERCIAL

## 2018-11-21 DIAGNOSIS — Z23 NEED FOR VACCINATION: ICD-10-CM

## 2018-11-21 PROCEDURE — 90471 IMMUNIZATION ADMIN: CPT | Performed by: FAMILY MEDICINE

## 2018-11-21 PROCEDURE — 90686 IIV4 VACC NO PRSV 0.5 ML IM: CPT | Performed by: FAMILY MEDICINE

## 2018-11-22 NOTE — PROGRESS NOTES
"José Bray is a 48 y.o. male here for a non-provider visit for:   FLU    Reason for immunization: Annual Flu Vaccine  Immunization records indicate need for vaccine: Yes, confirmed with Epic  Minimum interval has been met for this vaccine: Yes  ABN completed: No    Order and dose verified by: Anna HARTAMN Dated  08/07/18 was given to patient: Yes  All IAC Questionnaire questions were answered \"No.\"    Patient tolerated injection and no adverse effects were observed or reported: Yes    Pt scheduled for next dose in series: Not Indicated  "

## 2018-11-25 ENCOUNTER — PATIENT MESSAGE (OUTPATIENT)
Dept: MEDICAL GROUP | Facility: PHYSICIAN GROUP | Age: 48
End: 2018-11-25

## 2018-11-27 DIAGNOSIS — D68.51 FACTOR V LEIDEN (HCC): ICD-10-CM

## 2018-11-28 DIAGNOSIS — E11.9 TYPE 2 DIABETES MELLITUS WITHOUT COMPLICATION, UNSPECIFIED WHETHER LONG TERM INSULIN USE (HCC): ICD-10-CM

## 2018-11-28 NOTE — TELEPHONE ENCOUNTER
Received a pharmacy fax - insurance is no longer covering contour.  New rx attached for one touch meter and strips.

## 2018-12-04 ENCOUNTER — OFFICE VISIT (OUTPATIENT)
Dept: MEDICAL GROUP | Facility: PHYSICIAN GROUP | Age: 48
End: 2018-12-04
Payer: COMMERCIAL

## 2018-12-04 VITALS
RESPIRATION RATE: 14 BRPM | SYSTOLIC BLOOD PRESSURE: 104 MMHG | OXYGEN SATURATION: 95 % | DIASTOLIC BLOOD PRESSURE: 76 MMHG | HEIGHT: 75 IN | BODY MASS INDEX: 25.86 KG/M2 | TEMPERATURE: 98.5 F | WEIGHT: 208 LBS | HEART RATE: 62 BPM

## 2018-12-04 DIAGNOSIS — E11.9 CONTROLLED TYPE 2 DIABETES MELLITUS WITHOUT COMPLICATION, WITHOUT LONG-TERM CURRENT USE OF INSULIN (HCC): ICD-10-CM

## 2018-12-04 DIAGNOSIS — Z23 NEED FOR VACCINATION: ICD-10-CM

## 2018-12-04 LAB
HBA1C MFR BLD: 6 % (ref ?–5.8)
INT CON NEG: NEGATIVE
INT CON POS: POSITIVE

## 2018-12-04 PROCEDURE — 90471 IMMUNIZATION ADMIN: CPT | Performed by: PHYSICIAN ASSISTANT

## 2018-12-04 PROCEDURE — 99214 OFFICE O/P EST MOD 30 MIN: CPT | Mod: 25 | Performed by: PHYSICIAN ASSISTANT

## 2018-12-04 PROCEDURE — 90732 PPSV23 VACC 2 YRS+ SUBQ/IM: CPT | Performed by: PHYSICIAN ASSISTANT

## 2018-12-04 PROCEDURE — 83036 HEMOGLOBIN GLYCOSYLATED A1C: CPT | Performed by: PHYSICIAN ASSISTANT

## 2018-12-04 RX ORDER — GLIPIZIDE 10 MG/1
10 TABLET ORAL DAILY
Qty: 90 TAB | Refills: 3 | Status: SHIPPED | OUTPATIENT
Start: 2018-12-04 | End: 2019-02-14

## 2018-12-10 NOTE — PROGRESS NOTES
Chief Complaint   Patient presents with   • Follow-Up     diabetes/medication review/pt stopped taking trulicity       HISTORY OF PRESENT ILLNESS: oJsé Bray is an established 48 y.o. male here to discuss the evaluation and management of:    Controlled type 2 diabetes mellitus without complication, without long-term current use of insulin ([facility])  Patient is here today to follow-up on diabetes.  He tells me that he is currently prescribed Trulicity 0.75 mg every 7 days and metformin 1000 mg twice daily.  States he has went 1 week without Trulicity and he would like to discontinue medication.  States he has been experiencing increased flatulence, abdominal pain, constipation and now diarrhea. He admits that he does not always take Metformin with food.  He tells me that his blood glucose levels have been in the 200s.  These were not always fasting glucose levels.  Prior to Trulicity patient was prescribed Janumet  mg tablet advised take 1 tablet twice daily and glipizide 10 mg twice daily.  He tells me medication was discontinued because he was experiencing hypoglycemia.  Patient would like to be placed back on Janumet and glipizide.  States he did well when he was only taking glipizide once daily.   Patient is also requesting referral to endocrinology.        Patient Active Problem List    Diagnosis Date Noted   • Open toe wound 09/07/2018   • Atypical pigmented skin lesion 06/05/2018   • Arm numbness left 05/11/2018   • History of DVT (deep vein thrombosis) 04/03/2018   • Mixed hyperlipidemia 04/03/2018   • Factor V Leiden ([facility]) 09/25/2015   • Chronic anticoagulation 09/25/2015   • Type 2 diabetes mellitus without complication ([facility]) 09/25/2015   • Neck pain 09/25/2015       Allergies:Yellow dye    Current Outpatient Prescriptions   Medication Sig Dispense Refill   • sitagliptan-metformin (JANUMET)  MG per tablet Take 1 Tab by mouth 2 times a day, with meals. 180 Tab 3   • glipiZIDE  (GLUCOTROL) 10 MG Tab Take 1 Tab by mouth every day. 90 Tab 3   • Blood Glucose Test Strips One Touch Ultra Test Strips.  Test once daily and with symptoms of hypoglycemia.  Diagnosis: E11.9 100 Strip 3   • Blood Glucose Monitoring Suppl Device One Touch Ultra glucometer.  Test once daily and with symptoms of hypoglycemia.  Diagnosis: E11.9 1 Device 0   • XARELTO 20 MG Tab tablet TAKE 1 TABLET BY MOUTH ONCE DAILY WITH DINNER 30 Tab 11   • BENFOTIAMINE PO Take  by mouth.     • Multiple Vitamins-Minerals (MULTIVITAMIN ADULT PO) Take  by mouth.     • Cholecalciferol (VITAMIN D3) 2000 units Tab Take  by mouth.     • B Complex Vitamins (B COMPLEX PO) Take  by mouth.     • BIOTIN PO Take 10,000 mg by mouth.     • ascorbic acid (ASCORBIC ACID) 500 MG Tab Take 1,000 mg by mouth every day.       No current facility-administered medications for this visit.        Social History   Substance Use Topics   • Smoking status: Former Smoker     Quit date: 11/3/2001   • Smokeless tobacco: Former User     Quit date: 3/1/2011   • Alcohol use No      Comment: drinks non-alcoholic beer       Family Status   Relation Status   • Mo (Not Specified)   • Fa (Not Specified)   • MGMo (Not Specified)   • PGMo (Not Specified)   • PGFa (Not Specified)   • Neg Hx (Not Specified)     Family History   Problem Relation Age of Onset   • Cancer Mother         melanoma   • Heart Disease Father 61        MI, stent   • Diabetes Maternal Grandmother    • Hyperlipidemia Paternal Grandmother    • Heart Disease Paternal Grandfather         valve problems   • Alcohol/Drug Neg Hx        ROS:  Review of Systems   Constitutional: Negative for fever, chills, weight loss and malaise/fatigue.   HENT: Negative for ear pain, nosebleeds, congestion, sore throat and neck pain.    Eyes: Negative for blurred vision.   Respiratory: Negative for cough, sputum production, shortness of breath and wheezing.    Cardiovascular: Negative for chest pain, palpitations, orthopnea and  "leg swelling.   Gastrointestinal: Negative for heartburn, nausea, vomiting. Positive for abdominal pain and diarrhea.  Genitourinary: Negative for dysuria, urgency and frequency.   Musculoskeletal: Negative for myalgias, back pain and joint pain.   Skin: Negative for rash and itching.   Neurological: Negative for dizziness, tingling, tremors, sensory change, focal weakness and headaches.   Endo/Heme/Allergies: Does not bruise/bleed easily.   Psychiatric/Behavioral: Negative for depression, suicidal ideas and memory loss.  The patient is not nervous/anxious and does not have insomnia.    All other systems reviewed and are negative except as in HPI.    Exam: Blood pressure 104/76, pulse 62, temperature 36.9 °C (98.5 °F), resp. rate 14, height 1.892 m (6' 2.5\"), weight 94.3 kg (208 lb), SpO2 95 %. Body mass index is 26.35 kg/m².  General: Normal appearing. No distress.  HEENT: Normocephalic. Eyes conjunctiva clear lids without ptosis, pupils equal and reactive to light accommodation, ears normal shape and contour, canals are clear bilaterally, tympanic membranes are benign, nasal mucosa benign, oropharynx is without erythema, edema or exudates.   Neck: Supple without JVD or bruit. Thyroid is not enlarged.  Pulmonary: Clear to ausculation.  Normal effort. No rales, ronchi, or wheezing.  Cardiovascular: Regular rate and rhythm without murmur.  Abdomen: Soft, nontender, nondistended. Normal bowel sounds. Liver and spleen are not palpable  Neurologic: Grossly nonfocal.  Cranial nerves are normal.   Lymph: No cervical, supraclavicular or axillary lymph nodes are palpable  Skin: Warm and dry.  No rashes or suspicious skin lesions.  Musculoskeletal: Normal gait. No extremity cyanosis, clubbing, or edema.  Psych: Normal mood and affect. Alert and oriented x3. Judgment and insight is normal.    Medical decision-making and discussion:  1. Controlled type 2 diabetes mellitus without complication, without long-term current use of " insulin (HCC)  POCT Hemoglobin A1C: 6.0    Diabetes is controlled.  Per patient request patient has been prescribed Janumet 1 tab twice daily and glipizide 10 mg 1 tab once daily.  Discussed common side effects and adverse reactions of medication with patient.  Advised patient to take medications with food.  Discussed that metformin needs to be taken with food with patient.    Feel that patient may benefit from Jardiance.  Patient would like to follow-up with endocrinology.  A referral has been placed.  Patient is not taking a statin, aspirin or ACE inhibitor.  Advised patient take 81 mg aspirin once daily.    - POCT Hemoglobin A1C  - REFERRAL TO ENDOCRINOLOGY  - sitagliptan-metformin (JANUMET)  MG per tablet; Take 1 Tab by mouth 2 times a day, with meals.  Dispense: 180 Tab; Refill: 3  - glipiZIDE (GLUCOTROL) 10 MG Tab; Take 1 Tab by mouth every day.  Dispense: 90 Tab; Refill: 3    2. Need for vaccination  A Pneumovax vaccination was administered to patient without complication. A VIS form was provided to patient.     - Pneumococal Polysaccharide Vaccine 23-Valent =>1YO SQ/IM      Please note that this dictation was created using voice recognition software. I have made every reasonable attempt to correct obvious errors, but I expect that there are errors of grammar and possibly content that I did not discover before finalizing the note.        Return if symptoms worsen or fail to improve.

## 2019-01-08 ENCOUNTER — TELEPHONE (OUTPATIENT)
Dept: MEDICAL GROUP | Facility: PHYSICIAN GROUP | Age: 49
End: 2019-01-08

## 2019-01-08 NOTE — TELEPHONE ENCOUNTER
Phone Number Called: [phone] (home) [phone] (work)    Message: Called pt notified him letter is ready for . Pt will be by in the morning for .     Left Message for patient to call back: N\A

## 2019-01-08 NOTE — TELEPHONE ENCOUNTER
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    I am writing to ask for a note and explanation prescribing the benefits of massage therapy for my neck and arms. I am hopeful the note will support reimbursement of massage expenses from my flexible spending account.    I appreciate your help with this request.    Kind regards,    José Bray

## 2019-01-08 NOTE — LETTER
January 8, 2019       Patient: José Bray   YOB: 1970   Date of Visit: 1/8/2019         To Whom It May Concern:    It is my medical opinion that José Bray strongly benefits from massage therapy. He has chronic arthritis of cervical spine. Due to his chronic condition he experiences chronic pain on his neck, and sometimes numbness on his arms. He did have MRI 05/2018 showed C5-6 there is mild central canal stenosis, mild right and moderate to severe left neural foraminal stenosis secondary to disc bulge and endplate spurring. I have prescribed to patient massage therapy to reduce symptoms and to help him carry daily activities.   If you have any questions or concerns, please don't hesitate to call [phone]          Sincerely,          Maldonado Milton M.D.  Electronically Signed

## 2019-02-14 ENCOUNTER — OFFICE VISIT (OUTPATIENT)
Dept: ENDOCRINOLOGY | Facility: MEDICAL CENTER | Age: 49
End: 2019-02-14
Payer: COMMERCIAL

## 2019-02-14 VITALS
DIASTOLIC BLOOD PRESSURE: 70 MMHG | HEART RATE: 74 BPM | OXYGEN SATURATION: 95 % | SYSTOLIC BLOOD PRESSURE: 98 MMHG | BODY MASS INDEX: 27.72 KG/M2 | WEIGHT: 216 LBS | HEIGHT: 74 IN

## 2019-02-14 DIAGNOSIS — E11.9 TYPE 2 DIABETES MELLITUS WITHOUT COMPLICATION, UNSPECIFIED WHETHER LONG TERM INSULIN USE (HCC): ICD-10-CM

## 2019-02-14 PROCEDURE — 99204 OFFICE O/P NEW MOD 45 MIN: CPT | Performed by: PHYSICIAN ASSISTANT

## 2019-02-14 NOTE — PROGRESS NOTES
New Patient Consult Note  Referred by: Maldonado Milton M.D.    Reason for consult: Diabetes Management Type 2    HPI:  José Bray is a 48 y.o. old patient who is seeing us today for diabetes care.  This is a pleasant patient with diabetes and I appreciate the opportunity to participate in the care of this patient.  This is a new patient with me today.    Labs of 12/4/18 HbA1c is 6.0, >60,  Microalbumin ratio negative    BG Diary:2/14/2019  In the AM:  No log    Has been Diabetic since  Has a Glucagon pen at home: no    1. Type 2 diabetes mellitus without complication, unspecified whether long term insulin use (HCC  This is a new patient with me on  They are on:  1.   Janumet 50/1000  2.   Glipizide    STOP:  1.   Janumet 50/1000  2.   Glipizide    Start:  1.  Synjardy 12.5/1000 one in the AM one in the PM      ROS:   Constitutional: No change in weight , No fatigue, No night sweats.  HEENT: No Headache.  Eyes:  No blurred vision, No visual changes.  Cardiac: No chest pain, No palpitations.  Resp: No shortness of breath, No cough,   Gastro: No nausea or vomiting, No diarrhea.  Neuro: Denies numbness or tinging in bilateral feet or hands, and no loss of sensation.  Endo: No heat or cold intolerance.  : No polyuria, No polydipsia, No chronic UTI's.  Lower extremities: No lower leg edema bilateral.  All other systems were reviewed and were negative.    Past Medical History:  Patient Active Problem List    Diagnosis Date Noted   • Open toe wound 09/07/2018   • Atypical pigmented skin lesion 06/05/2018   • Arm numbness left 05/11/2018   • History of DVT (deep vein thrombosis) 04/03/2018   • Mixed hyperlipidemia 04/03/2018   • Factor V Leiden ([facility]) 09/25/2015   • Chronic anticoagulation 09/25/2015   • Type 2 diabetes mellitus without complication ([facility]) 09/25/2015   • Neck pain 09/25/2015       Past Surgical History:  Past Surgical History:   Procedure Laterality Date   • TONSILLECTOMY          Allergies:  Yellow dye    Social History:  Social History     Social History   • Marital status:      Spouse name: N/A   • Number of children: N/A   • Years of education: N/A     Occupational History   • Not on file.     Social History Main Topics   • Smoking status: Former Smoker     Quit date: 11/3/2001   • Smokeless tobacco: Former User     Quit date: 3/1/2011   • Alcohol use No      Comment: drinks non-alcoholic beer   • Drug use: No   • Sexual activity: Yes     Partners: Female      Comment: daughter      Other Topics Concern   • Not on file     Social History Narrative   • No narrative on file       Family History:  Family History   Problem Relation Age of Onset   • Cancer Mother         melanoma   • Heart Disease Father 61        MI, stent   • Diabetes Maternal Grandmother    • Hyperlipidemia Paternal Grandmother    • Heart Disease Paternal Grandfather         valve problems   • Alcohol/Drug Neg Hx        Medications:    Current Outpatient Prescriptions:   •  sitagliptan-metformin (JANUMET)  MG per tablet, Take 1 Tab by mouth 2 times a day, with meals., Disp: 180 Tab, Rfl: 3  •  glipiZIDE (GLUCOTROL) 10 MG Tab, Take 1 Tab by mouth every day., Disp: 90 Tab, Rfl: 3  •  XARELTO 20 MG Tab tablet, TAKE 1 TABLET BY MOUTH ONCE DAILY WITH DINNER, Disp: 30 Tab, Rfl: 11  •  BENFOTIAMINE PO, Take  by mouth., Disp: , Rfl:   •  Multiple Vitamins-Minerals (MULTIVITAMIN ADULT PO), Take  by mouth., Disp: , Rfl:   •  Cholecalciferol (VITAMIN D3) 2000 units Tab, Take  by mouth., Disp: , Rfl:   •  B Complex Vitamins (B COMPLEX PO), Take  by mouth., Disp: , Rfl:   •  BIOTIN PO, Take 10,000 mg by mouth., Disp: , Rfl:   •  ascorbic acid (ASCORBIC ACID) 500 MG Tab, Take 1,000 mg by mouth every day., Disp: , Rfl:   •  Blood Glucose Test Strips, One Touch Ultra Test Strips.  Test once daily and with symptoms of hypoglycemia.  Diagnosis: E11.9, Disp: 100 Strip, Rfl: 3  •  Blood Glucose Monitoring Suppl Device, One  "Touch Ultra glucometer.  Test once daily and with symptoms of hypoglycemia.  Diagnosis: E11.9, Disp: 1 Device, Rfl: 0      Physical Examination:   Vital signs: BP (!) 98/70 (BP Location: Right arm, Patient Position: Sitting)   Pulse 74   Ht 1.88 m (6' 2\")   Wt 98 kg (216 lb)   SpO2 95%   BMI 27.73 kg/m²   General: No distress, cooperative, well dressed and well nourished.   Eyes: No scleral icterus or discharge, No hyposphagma  ENMT: Normal on external inspection of nose, lips, No nasal drainage   Neck: No abnormal masses on inspection  Resp: Normal effort, Bilateral clear to auscultation, No wheezing, No rales  CVS: Regular rate and rhythm, S1 S2 normal, No murmur. No gallop  Extremities: No edema bilateral extremities  Neuro: Alert and oriented  Skin: No rash, No Ulcers  Psych: Normal mood and affect  Foot exam: normal sensation to monofilament testing, normal pulses, no ulcers.  Normal Vibration quantitative sensation test.    Assessment and Plan:    1. Type 2 diabetes mellitus without complication, unspecified whether long term insulin use (HCC)    STOP:  1.   Janumet 50/1000  2.   Glipizide    Start:  1.  Synjardy 12.5/1000 one in the AM one in the PM      Return in about 3 months (around 5/14/2019).    Blood glucose log: Check BG in the morning when wake up, before lunch or dinner and before bed.  So three times a day.  Always bring BG diary to the next office visit.    Thank you kindly for allowing me to participate in the diabetes care plan for this patient.    Milton Amin PA-C, BC-ADM  Board Certified - Advanced Diabetes Management  02/14/19    CC:   Maldonado Milton M.D.    "

## 2019-02-14 NOTE — PATIENT INSTRUCTIONS
STOP:  1.   Janumet 50/1000  2.   Glipizide    Start:  1.  Synjardy 12.5/1000 one in the AM one in the PM

## 2019-03-12 ENCOUNTER — OFFICE VISIT (OUTPATIENT)
Dept: MEDICAL GROUP | Facility: PHYSICIAN GROUP | Age: 49
End: 2019-03-12
Payer: COMMERCIAL

## 2019-03-12 VITALS
RESPIRATION RATE: 12 BRPM | DIASTOLIC BLOOD PRESSURE: 62 MMHG | SYSTOLIC BLOOD PRESSURE: 108 MMHG | WEIGHT: 211.4 LBS | BODY MASS INDEX: 27.13 KG/M2 | TEMPERATURE: 98.5 F | HEIGHT: 74 IN | OXYGEN SATURATION: 93 % | HEART RATE: 78 BPM

## 2019-03-12 DIAGNOSIS — Z23 NEED FOR VACCINATION: ICD-10-CM

## 2019-03-12 DIAGNOSIS — M25.572 CHRONIC PAIN OF BOTH ANKLES: Primary | ICD-10-CM

## 2019-03-12 DIAGNOSIS — M26.629 TMJ SYNDROME: ICD-10-CM

## 2019-03-12 DIAGNOSIS — M77.02 MEDIAL EPICONDYLITIS OF BOTH ELBOWS: ICD-10-CM

## 2019-03-12 DIAGNOSIS — M25.571 CHRONIC PAIN OF BOTH ANKLES: Primary | ICD-10-CM

## 2019-03-12 DIAGNOSIS — M77.01 MEDIAL EPICONDYLITIS OF BOTH ELBOWS: ICD-10-CM

## 2019-03-12 DIAGNOSIS — G89.29 CHRONIC PAIN OF BOTH ANKLES: Primary | ICD-10-CM

## 2019-03-12 DIAGNOSIS — R00.0 TACHYCARDIA: ICD-10-CM

## 2019-03-12 PROBLEM — M19.90 OSTEOARTHRITIS: Status: ACTIVE | Noted: 2019-03-12

## 2019-03-12 PROBLEM — F10.11 HISTORY OF ALCOHOL ABUSE: Status: ACTIVE | Noted: 2019-03-12

## 2019-03-12 PROCEDURE — 99214 OFFICE O/P EST MOD 30 MIN: CPT | Mod: 25 | Performed by: FAMILY MEDICINE

## 2019-03-12 PROCEDURE — 90715 TDAP VACCINE 7 YRS/> IM: CPT | Performed by: FAMILY MEDICINE

## 2019-03-12 PROCEDURE — 90471 IMMUNIZATION ADMIN: CPT | Performed by: FAMILY MEDICINE

## 2019-03-12 RX ORDER — CHLORAL HYDRATE 500 MG
1000 CAPSULE ORAL
COMMUNITY
End: 2022-05-19

## 2019-03-12 ASSESSMENT — PATIENT HEALTH QUESTIONNAIRE - PHQ9: CLINICAL INTERPRETATION OF PHQ2 SCORE: 0

## 2019-03-12 NOTE — ASSESSMENT & PLAN NOTE
This is a chronic condition. He notes during HIIT he will get a very fast heart rate up to 170s. His calculated max HR is 171. He denies any irregular heart beat.

## 2019-03-12 NOTE — ASSESSMENT & PLAN NOTE
He has a longstanding history of grinding his teeth. He gets pain in both TMJs. He doesn't get much clicking but hurts when chewing and at rest. If there is stress it gets worse.

## 2019-03-12 NOTE — PROGRESS NOTES
Subjective:     CC:  Diagnoses of Chronic pain of both ankles, Need for vaccination, Tachycardia, Open wound of toe, sequela, TMJ syndrome, and Medial epicondylitis of both elbows were pertinent to this visit.    HISTORY OF THE PRESENT ILLNESS: Patient is a 48 y.o. male. This pleasant patient is here today to establish care and discuss a couple conerns. His prior PCP was Maldonado Milton MD.    Tachycardia  This is a chronic condition. He notes during HIIT he will get a very fast heart rate up to 170s. His calculated max HR is 171. He denies any irregular heart beat.     Chronic pain of both ankles  This is a chronic condition.  Reports having chronic pain in both ankles with the left being more affected than the right.  Pain will occur when he is not use the ankle for a while and then as he starts to use and walk around the will start to get better.  The pain is located laterally around under the lateral malleolus.  He does report spraining his ankles multiple times in the past and having severe sprains where he gets severe swelling.    TMJ syndrome  He has a longstanding history of grinding his teeth. He gets pain in both TMJs. He doesn't get much clicking but hurts when chewing and at rest. If there is stress it gets worse.     Medial epicondylitis of both elbows  This is a chronic condition.  He reports bilateral elbow pain since October, 2018.  At that time he was fixing a retaining wall at home and was lifting a lot of heavy objects.  He remembers having similar pain many years ago that lasted for a few years and then went away on its own.      Allergies: Yellow dye    Current Outpatient Prescriptions Ordered in [facility]   Medication Sig Dispense Refill   • Omega-3 Fatty Acids (FISH OIL) 1000 MG Cap capsule Take 1,000 mg by mouth 3 times a day, with meals.     • Empagliflozin-Metformin HCl ER 12.5-1000 MG TABLET SR 24 HR Take 1 Tab by mouth 2 times a day. 60 Tab 11   • Blood Glucose Test Strips One Touch Ultra Test  "Strips.  Test once daily and with symptoms of hypoglycemia.  Diagnosis: E11.9 100 Strip 3   • Blood Glucose Monitoring Suppl Device One Touch Ultra glucometer.  Test once daily and with symptoms of hypoglycemia.  Diagnosis: E11.9 1 Device 0   • XARELTO 20 MG Tab tablet TAKE 1 TABLET BY MOUTH ONCE DAILY WITH DINNER 30 Tab 11   • BENFOTIAMINE PO Take  by mouth.     • Multiple Vitamins-Minerals (MULTIVITAMIN ADULT PO) Take  by mouth.     • Cholecalciferol (VITAMIN D3) 2000 units Tab Take  by mouth.     • B Complex Vitamins (B COMPLEX PO) Take  by mouth.     • BIOTIN PO Take 10,000 mg by mouth.       No current Epic-ordered facility-administered medications on file.        Past Medical History:   Diagnosis Date   • Diabetes (HCC)    • Factor V deficiency (HCC)    • Hyperlipidemia        Past Surgical History:   Procedure Laterality Date   • TONSILLECTOMY         Social History   Substance Use Topics   • Smoking status: Former Smoker     Quit date: 11/3/2001   • Smokeless tobacco: Former User     Quit date: 3/1/2011   • Alcohol use No      Comment: drinks non-alcoholic beer, quit 2013       Social History     Social History Narrative   • No narrative on file       Family History   Problem Relation Age of Onset   • Cancer Mother         melanoma   • Heart Disease Father 61        MI, stent   • Diabetes Maternal Grandmother    • Hyperlipidemia Paternal Grandmother    • Heart Disease Paternal Grandfather         valve problems   • Alcohol/Drug Neg Hx        Health Maintenance: Completed    ROS:   Gen: no fevers, + chills intermittent  Eyes: no changes in vision  ENT: no sore throat  Pulm: no sob  CV: no chest pain  GI: no diarrhea  : no dysuria  MSk: + joint pain  Skin: no rash  Neuro: + headaches - maybe from TMJ      Objective:     Exam: Blood pressure 108/62, pulse 78, temperature 36.9 °C (98.5 °F), resp. rate 12, height 1.88 m (6' 2\"), weight 95.9 kg (211 lb 6.4 oz), SpO2 93 %. Body mass index is 27.14 " kg/m².    General: Normal appearing. No distress.  HEENT: Normocephalic. Eyes conjunctiva clear lids without ptosis, pupils equal and reactive to light accommodation, ears normal shape and contour, canals are clear bilaterally, tympanic membranes are benign, oropharynx is without erythema, edema or exudates.   Neck: Supple without JVD. Thyroid is not enlarged.  Pulmonary: Clear to ausculation.  Normal effort. No rales, ronchi, or wheezing.  Cardiovascular: Regular rate and rhythm without murmur. Carotid and radial pulses are intact and equal bilaterally.  Abdomen: Soft, nontender, nondistended. Normal bowel sounds. Liver and spleen are not palpable  Neurologic: Grossly nonfocal  Lymph: No cervical or supraclavicular lymph nodes are palpable  Skin: Warm and dry.  No obvious lesions.  Musculoskeletal: Normal gait. No extremity cyanosis, clubbing, or edema.  Elbow: No deformity, swelling or bruising noted. Tenderness to palpation on medial condyles bilaterally. Full range of motion bilaterally. 5/5 strength bilaterally. Pain bilaterally with wrist flexion against resistance.  Ankles: No noticeable deformity, swelling or bruising. ROM intact. Tenderness to palpation under left malleolus. No tenderness to palpation along posterior edge of medial malleoli, base of 5th metatarsal or navicular bone. No tenderness along fibula. Squeeze test negative. External rotation test negative. Anterior drawer negative. Talar tilt negative. 5/5 strength bilaterally. Sensation intact.   Psych: Normal mood and affect. Alert and oriented x3. Judgment and insight is normal.    Assessment & Plan:   48 y.o. male with the following -    1. Chronic pain of both ankles  This is a chronic condition.  He reports that he has had chronic pain in both ankles, the left more so than the right.  He reports that he has sprained both ankles multiple times and it sometimes has had very severe sprains where it got extremely swollen.  On exam his right ankle  is completely nontender to palpation but the left ankle was tender along the ligament under the lateral malleolus.  I suspect that he may have weak ankles which leads to easy spraining.  In light of him training to [place] a 22,000 foot peak at the end of 2020 I recommended he work on ankle strengthening.  - REFERRAL TO PHYSICAL THERAPY Reason for Therapy: Eval/Treat/Report    2. Medial epicondylitis of both elbows  This is a chronic condition.  He reports since October, 2018 he has had bilateral elbow pain.  At that time he was fixing a retaining wall at home and lifting a lot of heavy materials.  Previously, he does note he had similar pain several years ago that lasted for a couple years and then resolved on its own.  On exam he has medial epicondylitis bilaterally and I suggested physical therapy.  He cannot use anti-inflammatories or NSAIDs because he has gastritis versus ulcer disease on a recent EGD.  - REFERRAL TO PHYSICAL THERAPY Reason for Therapy: Eval/Treat/Report    3. Tachycardia  This is a chronic condition.  He reports the exercises that his heart rate gets very fast, in the 170s.  His calculated max heart rate is 171 based on his age.  He reports he is doing high intensity interval training which would lead to these high heart rates.  He denies any irregular heartbeats.  However, he does have an exercise stress test from 2014 which showed occasional PVCs with exertion.  In light of the fact that he is training intensely to [place]000 foot peak we have decided he should see cardiology for further evaluation of the exertional PVCs and high heart rate.  - REFERRAL TO CARDIOLOGY    4. TMJ syndrome  This is a chronic condition.  He reports that he has been grinding his teeth throughout his entire life he does wear a mouthguard.  He gets chronic pain in his jaw bilaterally right at the TMJ.  He denies any clicking but just notes pain anytime he is at rest or chewing.  He also gets pain with increased  stress as he will grind his teeth more and then when stress is relieved the pain a little bit better.  We discussed that the only treatment for TMJ syndrome is symptomatically with NSAIDs, which she cannot take as stated above because of a recent EGD.    5. Need for vaccination  - TDAP VACCINE =>6YO IM    Return in about 2 months (around 5/20/2019) for f/u ankle and elbow pain.    Please note that this dictation was created using voice recognition software. I have made every reasonable attempt to correct obvious errors, but I expect that there are errors of grammar and possibly content that I did not discover before finalizing the note.

## 2019-03-12 NOTE — ASSESSMENT & PLAN NOTE
This is a chronic condition.  Reports having chronic pain in both ankles with the left being more affected than the right.  Pain will occur when he is not use the ankle for a while and then as he starts to use and walk around the will start to get better.  The pain is located laterally around under the lateral malleolus.  He does report spraining his ankles multiple times in the past and having severe sprains where he gets severe swelling.

## 2019-03-13 NOTE — ASSESSMENT & PLAN NOTE
This is a chronic condition.  He reports bilateral elbow pain since October, 2018.  At that time he was fixing a retaining wall at home and was lifting a lot of heavy objects.  He remembers having similar pain many years ago that lasted for a few years and then went away on its own.

## 2019-03-29 ENCOUNTER — OFFICE VISIT (OUTPATIENT)
Dept: ENDOCRINOLOGY | Facility: MEDICAL CENTER | Age: 49
End: 2019-03-29
Payer: COMMERCIAL

## 2019-03-29 VITALS
BODY MASS INDEX: 27.08 KG/M2 | HEIGHT: 74 IN | WEIGHT: 211 LBS | HEART RATE: 91 BPM | SYSTOLIC BLOOD PRESSURE: 110 MMHG | OXYGEN SATURATION: 95 % | DIASTOLIC BLOOD PRESSURE: 64 MMHG

## 2019-03-29 DIAGNOSIS — E11.9 TYPE 2 DIABETES MELLITUS WITHOUT COMPLICATION, UNSPECIFIED WHETHER LONG TERM INSULIN USE (HCC): ICD-10-CM

## 2019-03-29 LAB
HBA1C MFR BLD: 6.7 % (ref 0–5.6)
INT CON NEG: NEGATIVE
INT CON POS: POSITIVE

## 2019-03-29 PROCEDURE — 99213 OFFICE O/P EST LOW 20 MIN: CPT | Performed by: PHYSICIAN ASSISTANT

## 2019-03-29 PROCEDURE — 83036 HEMOGLOBIN GLYCOSYLATED A1C: CPT | Performed by: PHYSICIAN ASSISTANT

## 2019-03-29 NOTE — PROGRESS NOTES
Return to office Patient Consult Note  Referred by: Maldonado Milton M.D.    Reason for consult: Diabetes Management Type 2    HPI:  José Bray is a 48 y.o. old patient who is seeing us today for diabetes care.  This is a pleasant patient with diabetes and I appreciate the opportunity to participate in the care of this patient.    Labs of 3/29/19 HbA1c is 6.7  Labs of 12/4/18 HbA1c is 6.0, >60,  Microalbumin ratio negative    BG Diary:3/29/2019  In the AM: no log    Weight:      1. Type 2 diabetes mellitus without complication, unspecified whether long term insulin use (HCC)  This is a new patient with me on  They are on:  1.   Janumet 50/1000  2.   Glipizide     STOP:  1.   Janumet 50/1000  2.   Glipizide     Now on:    1.  Synjardy 12.5/1000 one in the AM one in the PM        ROS:   Constitutional: No night sweats.  Eyes:  No visual changes.  Cardiac: No chest pain, No palpitations or racing heart rate.  Resp: No shortness of breath, No cough,   Gi: No Diarrhea    All other systems were reviewed and were/are negative.      Past Medical History:  Patient Active Problem List    Diagnosis Date Noted   • Chronic pain of both ankles 03/12/2019   • Tachycardia 03/12/2019   • TMJ syndrome 03/12/2019   • History of alcohol abuse 03/12/2019   • Medial epicondylitis of both elbows 03/12/2019   • Open toe wound 09/07/2018   • Atypical pigmented skin lesion 06/05/2018   • Arm numbness left 05/11/2018   • History of DVT (deep vein thrombosis) 04/03/2018   • Mixed hyperlipidemia 04/03/2018   • Factor V Leiden (HCC) 09/25/2015   • Chronic anticoagulation 09/25/2015   • Type 2 diabetes mellitus without complication ([facility]) 09/25/2015   • Neck pain 09/25/2015       Past Surgical History:  Past Surgical History:   Procedure Laterality Date   • TONSILLECTOMY         Allergies:  Yellow dye    Social History:  Social History     Social History   • Marital status:      Spouse name: N/A   • Number of children: N/A   •  Years of education: N/A     Occupational History   • Not on file.     Social History Main Topics   • Smoking status: Former Smoker     Quit date: 11/3/2001   • Smokeless tobacco: Former User     Quit date: 3/1/2011   • Alcohol use No      Comment: drinks non-alcoholic beer, quit 2013   • Drug use: No   • Sexual activity: Yes     Partners: Female      Comment: daughter      Other Topics Concern   • Not on file     Social History Narrative   • No narrative on file       Family History:  Family History   Problem Relation Age of Onset   • Cancer Mother         melanoma   • Heart Disease Father 61        MI, stent   • Diabetes Maternal Grandmother    • Hyperlipidemia Paternal Grandmother    • Heart Disease Paternal Grandfather         valve problems   • Alcohol/Drug Neg Hx        Medications:    Current Outpatient Prescriptions:   •  Empagliflozin-Metformin HCl ER 12.5-1000 MG TABLET SR 24 HR, Take 1 Tab by mouth 2 times a day., Disp: 60 Tab, Rfl: 11  •  Omega-3 Fatty Acids (FISH OIL) 1000 MG Cap capsule, Take 1,000 mg by mouth 3 times a day, with meals., Disp: , Rfl:   •  Blood Glucose Test Strips, One Touch Ultra Test Strips.  Test once daily and with symptoms of hypoglycemia.  Diagnosis: E11.9, Disp: 100 Strip, Rfl: 3  •  Blood Glucose Monitoring Suppl Device, One Touch Ultra glucometer.  Test once daily and with symptoms of hypoglycemia.  Diagnosis: E11.9, Disp: 1 Device, Rfl: 0  •  XARELTO 20 MG Tab tablet, TAKE 1 TABLET BY MOUTH ONCE DAILY WITH DINNER, Disp: 30 Tab, Rfl: 11  •  BENFOTIAMINE PO, Take  by mouth., Disp: , Rfl:   •  Multiple Vitamins-Minerals (MULTIVITAMIN ADULT PO), Take  by mouth., Disp: , Rfl:   •  Cholecalciferol (VITAMIN D3) 2000 units Tab, Take  by mouth., Disp: , Rfl:   •  B Complex Vitamins (B COMPLEX PO), Take  by mouth., Disp: , Rfl:   •  BIOTIN PO, Take 10,000 mg by mouth., Disp: , Rfl:         Physical Examination:   Vital signs: /64 (BP Location: Left arm, Patient Position: Sitting)   " Pulse 91   Ht 1.88 m (6' 2\")   Wt 95.7 kg (211 lb)   SpO2 95%   BMI 27.09 kg/m²   General: No distress, cooperative, well dressed and well nourished.   Eyes: No scleral icterus or discharge, No hyposphagma  ENMT: Normal on external inspection of nose, lips, No nasal drainage   Neck: No abnormal masses on inspection  Resp: Normal effort, Bilateral clear to auscultation, No wheezing, No rales  CVS: Regular rate and rhythm, S1 S2 normal, No murmur. No gallop  Extremities: No edema bilateral extremities  Neuro: Alert and oriented  Skin: No rash, No Ulcers  Psych: Normal mood and affect      Assessment and Plan:    1. Type 2 diabetes mellitus without complication, unspecified whether long term insulin use (HCC)  Now on:    1.  Synjardy 12.5/1000 one in the AM one in the PM    Return in about 1 year (around 3/29/2020).      Thank you kindly for allowing me to participate in the diabetes care plan for this patient.    Milton Amin PA-C, BC-ADM  Board Certified - [facility] Diabetes [facility]  03/29/19    CC:   Maldonado Milton M.D.    "

## 2019-04-17 ENCOUNTER — OFFICE VISIT (OUTPATIENT)
Dept: CARDIOLOGY | Facility: MEDICAL CENTER | Age: 49
End: 2019-04-17
Payer: COMMERCIAL

## 2019-04-17 VITALS
DIASTOLIC BLOOD PRESSURE: 70 MMHG | SYSTOLIC BLOOD PRESSURE: 118 MMHG | HEART RATE: 64 BPM | BODY MASS INDEX: 26.95 KG/M2 | HEIGHT: 74 IN | WEIGHT: 210 LBS

## 2019-04-17 DIAGNOSIS — E78.2 MIXED HYPERLIPIDEMIA: ICD-10-CM

## 2019-04-17 DIAGNOSIS — R00.0 FAST HEART BEAT: Primary | ICD-10-CM

## 2019-04-17 DIAGNOSIS — R00.0 TACHYCARDIA: ICD-10-CM

## 2019-04-17 DIAGNOSIS — E11.9 TYPE 2 DIABETES MELLITUS WITHOUT COMPLICATION, WITHOUT LONG-TERM CURRENT USE OF INSULIN (HCC): ICD-10-CM

## 2019-04-17 PROCEDURE — 93000 ELECTROCARDIOGRAM COMPLETE: CPT | Performed by: INTERNAL MEDICINE

## 2019-04-17 PROCEDURE — 99204 OFFICE O/P NEW MOD 45 MIN: CPT | Performed by: INTERNAL MEDICINE

## 2019-04-17 RX ORDER — ROSUVASTATIN CALCIUM 5 MG/1
5 TABLET, COATED ORAL EVERY EVENING
Qty: 30 TAB | Refills: 11 | Status: SHIPPED | OUTPATIENT
Start: 2019-04-17 | End: 2019-06-03 | Stop reason: SDUPTHER

## 2019-04-17 ASSESSMENT — ENCOUNTER SYMPTOMS
SHORTNESS OF BREATH: 0
COUGH: 0
NAUSEA: 0
BLURRED VISION: 0
HEARTBURN: 0
MYALGIAS: 0
PALPITATIONS: 1
NERVOUS/ANXIOUS: 0
DIZZINESS: 0
DEPRESSION: 0
CHILLS: 0
EYE DISCHARGE: 0
HEADACHES: 0
FEVER: 0
BRUISES/BLEEDS EASILY: 0
PND: 0

## 2019-04-17 NOTE — LETTER
[facility]   [address], [address]  [place] [address]  Phone: [phone]  Fax: [phone]              José Bray  1970    Encounter Date: 4/17/2019    Jung Rojas M.D.          PROGRESS NOTE:  Chief Complaint   Patient presents with   • Tachycardia       Subjective:   José Bray is a 48 y.o. male who presents today in consultation at the request of Carolina Adkins for evaluation of chest pains at rest and palpitations with intensive exercise.    This patient is training for altitude mountain climbing in 2020.  He does intensive physical exercise and notes very rapid heart rate and worries about his heart.    At rest, he will sometimes feel a left anterior chest tightness but he does not feel it with exercise.    He does have type 2 diabetes mellitus and mild hyperlipidemia with LDL in the past of 113 and 139.  He is not taking a statin.  He used to take simvastatin he developed some arthralgias and no longer takes it.    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  He is on lifetime Xarelto.    Past Medical History:   Diagnosis Date   • Diabetes (HCC)    • Factor V deficiency (HCC)    • Hyperlipidemia      Past Surgical History:   Procedure Laterality Date   • TONSILLECTOMY       Family History   Problem Relation Age of Onset   • Cancer Mother         melanoma   • Heart Disease Father 61        MI, stent   • Diabetes Maternal Grandmother    • Hyperlipidemia Paternal Grandmother    • Heart Disease Paternal Grandfather         valve problems   • Alcohol/Drug Neg Hx      Social History     Social History   • Marital status:      Spouse name: N/A   • Number of children: N/A   • Years of education: N/A     Occupational History   • Not on file.     Social History Main Topics   • Smoking status: Former Smoker     Quit date: 11/3/2001   • Smokeless tobacco: Former User     Quit date: 3/1/2011   •  Alcohol use No      Comment: drinks non-alcoholic beer, quit 2013   • Drug use: No   • Sexual activity: Yes     Partners: Female      Comment: daughter      Other Topics Concern   • Not on file     Social History Narrative   • No narrative on file     Allergies   Allergen Reactions   • Yellow Dye      Outpatient Encounter Prescriptions as of 4/17/2019   Medication Sig Dispense Refill   • Milk Thistle 1000 MG Cap Take  by mouth.     • rosuvastatin (CRESTOR) 5 MG Tab Take 1 Tab by mouth every evening. 30 Tab 11   • Empagliflozin-Metformin HCl ER 12.5-1000 MG TABLET SR 24 HR Take 1 Tab by mouth 2 times a day. 60 Tab 11   • Omega-3 Fatty Acids (FISH OIL) 1000 MG Cap capsule Take 1,000 mg by mouth 3 times a day, with meals.     • Blood Glucose Test Strips One Touch Ultra Test Strips.  Test once daily and with symptoms of hypoglycemia.  Diagnosis: E11.9 100 Strip 3   • Blood Glucose Monitoring Suppl Device One Touch Ultra glucometer.  Test once daily and with symptoms of hypoglycemia.  Diagnosis: E11.9 1 Device 0   • XARELTO 20 MG Tab tablet TAKE 1 TABLET BY MOUTH ONCE DAILY WITH DINNER 30 Tab 11   • BENFOTIAMINE PO Take  by mouth.     • Cholecalciferol (VITAMIN D3) 2000 units Tab Take  by mouth.     • B Complex Vitamins (B COMPLEX PO) Take  by mouth.     • BIOTIN PO Take 10,000 mg by mouth.     • [DISCONTINUED] Multiple Vitamins-Minerals (MULTIVITAMIN ADULT PO) Take  by mouth.       No facility-administered encounter medications on file as of 4/17/2019.      Review of Systems   Constitutional: Negative for chills, fever and malaise/fatigue.   Eyes: Negative for blurred vision and discharge.   Respiratory: Negative for cough and shortness of breath.    Cardiovascular: Positive for chest pain and palpitations. Negative for leg swelling and PND.   Gastrointestinal: Negative for heartburn and nausea.   Genitourinary: Negative for dysuria and urgency.   Musculoskeletal: Negative for myalgias.   Skin: Negative for itching and  "rash.   Neurological: Negative for dizziness and headaches.   Endo/Heme/Allergies: Negative for environmental allergies. Does not bruise/bleed easily.   Psychiatric/Behavioral: Negative for depression. The patient is not nervous/anxious.         Objective:   /70 (BP Location: Left arm, Patient Position: Sitting, BP Cuff Size: Adult)   Pulse 64   Ht 1.88 m (6' 2\")   Wt 95.3 kg (210 lb)   BMI 26.96 kg/m²      Physical Exam   Constitutional: He is oriented to person, place, and time. He appears well-developed and well-nourished.   Neck: JVD present.   Cardiovascular: Normal rate and regular rhythm.  Exam reveals no gallop and no friction rub.    No murmur heard.  Pulses:       Carotid pulses are 2+ on the right side, and 2+ on the left side.       Radial pulses are 2+ on the right side, and 2+ on the left side.        Femoral pulses are 2+ on the right side, and 2+ on the left side.       Popliteal pulses are 2+ on the right side, and 2+ on the left side.        Dorsalis pedis pulses are 2+ on the right side, and 2+ on the left side.        Posterior tibial pulses are 2+ on the right side, and 2+ on the left side.   Pulmonary/Chest: Effort normal and breath sounds normal.   Abdominal: Soft. There is no tenderness.   Musculoskeletal: He exhibits edema.   Neurological: He is alert and oriented to person, place, and time.   Skin: Skin is warm.       Assessment:     1. Fast heart beat  EKG    NM-CARDIAC STRESS TEST   2. Tachycardia     3. Mixed hyperlipidemia  CT-CARDIAC SCORING    Comp Metabolic Panel    LIPID PANEL   4. Type 2 diabetes mellitus without complication, without long-term current use of insulin ([facility])  CT-CARDIAC SCORING    NM-CARDIAC STRESS TEST       Medical Decision Making:  Today's Assessment / Status / Plan:   With respect to exercise-induced palpitations and chest pressure at rest, I have ordered a treadmill MPI.  With respect to mild hyperlipidemia and is diabetic I have ordered Crestor 5 " mg/day.  Lab work in 6-8 weeks.  I have also ordered a CAT scan for coronary artery calcification scoring.    Return in 6 weeks after lab work to check Crestor effect.  Thank you for this consultation.      Carolina Adkins M.D.  [address]  [place] [address]  VIA In Basket

## 2019-04-18 LAB — EKG IMPRESSION: NORMAL

## 2019-04-18 NOTE — PROGRESS NOTES
Chief Complaint   Patient presents with   • Tachycardia       Subjective:   José Bray is a 48 y.o. male who presents today in consultation at the request of Carolina Adkins for evaluation of chest pains at rest and palpitations with intensive exercise.    This patient is training for altitude mountain climbing in 2020.  He does intensive physical exercise and notes very rapid heart rate and worries about his heart.    At rest, he will sometimes feel a left anterior chest tightness but he does not feel it with exercise.    He does have type 2 diabetes mellitus and mild hyperlipidemia with LDL in the past of 113 and 139.  He is not taking a statin.  He used to take simvastatin he developed some arthralgias and no longer takes it.    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  He is on lifetime Xarelto.    Past Medical History:   Diagnosis Date   • Diabetes (HCC)    • Factor V deficiency (HCC)    • Hyperlipidemia      Past Surgical History:   Procedure Laterality Date   • TONSILLECTOMY       Family History   Problem Relation Age of Onset   • Cancer Mother         melanoma   • Heart Disease Father 61        MI, stent   • Diabetes Maternal Grandmother    • Hyperlipidemia Paternal Grandmother    • Heart Disease Paternal Grandfather         valve problems   • Alcohol/Drug Neg Hx      Social History     Social History   • Marital status:      Spouse name: N/A   • Number of children: N/A   • Years of education: N/A     Occupational History   • Not on file.     Social History Main Topics   • Smoking status: Former Smoker     Quit date: 11/3/2001   • Smokeless tobacco: Former User     Quit date: 3/1/2011   • Alcohol use No      Comment: drinks non-alcoholic beer, quit 2013   • Drug use: No   • Sexual activity: Yes     Partners: Female      Comment: daughter      Other Topics Concern   • Not on file     Social History Narrative   • No narrative on file     Allergies   Allergen  Reactions   • Yellow Dye      Outpatient Encounter Prescriptions as of 4/17/2019   Medication Sig Dispense Refill   • Milk Thistle 1000 MG Cap Take  by mouth.     • rosuvastatin (CRESTOR) 5 MG Tab Take 1 Tab by mouth every evening. 30 Tab 11   • Empagliflozin-Metformin HCl ER 12.5-1000 MG TABLET SR 24 HR Take 1 Tab by mouth 2 times a day. 60 Tab 11   • Omega-3 Fatty Acids (FISH OIL) 1000 MG Cap capsule Take 1,000 mg by mouth 3 times a day, with meals.     • Blood Glucose Test Strips One Touch Ultra Test Strips.  Test once daily and with symptoms of hypoglycemia.  Diagnosis: E11.9 100 Strip 3   • Blood Glucose Monitoring Suppl Device One Touch Ultra glucometer.  Test once daily and with symptoms of hypoglycemia.  Diagnosis: E11.9 1 Device 0   • XARELTO 20 MG Tab tablet TAKE 1 TABLET BY MOUTH ONCE DAILY WITH DINNER 30 Tab 11   • BENFOTIAMINE PO Take  by mouth.     • Cholecalciferol (VITAMIN D3) 2000 units Tab Take  by mouth.     • B Complex Vitamins (B COMPLEX PO) Take  by mouth.     • BIOTIN PO Take 10,000 mg by mouth.     • [DISCONTINUED] Multiple Vitamins-Minerals (MULTIVITAMIN ADULT PO) Take  by mouth.       No facility-administered encounter medications on file as of 4/17/2019.      Review of Systems   Constitutional: Negative for chills, fever and malaise/fatigue.   Eyes: Negative for blurred vision and discharge.   Respiratory: Negative for cough and shortness of breath.    Cardiovascular: Positive for chest pain and palpitations. Negative for leg swelling and PND.   Gastrointestinal: Negative for heartburn and nausea.   Genitourinary: Negative for dysuria and urgency.   Musculoskeletal: Negative for myalgias.   Skin: Negative for itching and rash.   Neurological: Negative for dizziness and headaches.   Endo/Heme/Allergies: Negative for environmental allergies. Does not bruise/bleed easily.   Psychiatric/Behavioral: Negative for depression. The patient is not nervous/anxious.         Objective:   /70 (BP  "Location: Left arm, Patient Position: Sitting, BP Cuff Size: Adult)   Pulse 64   Ht 1.88 m (6' 2\")   Wt 95.3 kg (210 lb)   BMI 26.96 kg/m²     Physical Exam   Constitutional: He is oriented to person, place, and time. He appears well-developed and well-nourished.   Neck: JVD present.   Cardiovascular: Normal rate and regular rhythm.  Exam reveals no gallop and no friction rub.    No murmur heard.  Pulses:       Carotid pulses are 2+ on the right side, and 2+ on the left side.       Radial pulses are 2+ on the right side, and 2+ on the left side.        Femoral pulses are 2+ on the right side, and 2+ on the left side.       Popliteal pulses are 2+ on the right side, and 2+ on the left side.        Dorsalis pedis pulses are 2+ on the right side, and 2+ on the left side.        Posterior tibial pulses are 2+ on the right side, and 2+ on the left side.   Pulmonary/Chest: Effort normal and breath sounds normal.   Abdominal: Soft. There is no tenderness.   Musculoskeletal: He exhibits edema.   Neurological: He is alert and oriented to person, place, and time.   Skin: Skin is warm.       Assessment:     1. Fast heart beat  EKG    NM-CARDIAC STRESS TEST   2. Tachycardia     3. Mixed hyperlipidemia  CT-CARDIAC SCORING    Comp Metabolic Panel    LIPID PANEL   4. Type 2 diabetes mellitus without complication, without long-term current use of insulin (HCC)  CT-CARDIAC SCORING    NM-CARDIAC STRESS TEST       Medical Decision Making:  Today's Assessment / Status / Plan:   With respect to exercise-induced palpitations and chest pressure at rest, I have ordered a treadmill MPI.  With respect to mild hyperlipidemia and is diabetic I have ordered Crestor 5 mg/day.  Lab work in 6-8 weeks.  I have also ordered a CAT scan for coronary artery calcification scoring.    Return in 6 weeks after lab work to check Crestor effect.  Thank you for this consultation.  "

## 2019-04-30 ENCOUNTER — HOSPITAL ENCOUNTER (OUTPATIENT)
Dept: RADIOLOGY | Facility: MEDICAL CENTER | Age: 49
End: 2019-04-30
Attending: INTERNAL MEDICINE
Payer: COMMERCIAL

## 2019-04-30 DIAGNOSIS — E11.9 TYPE 2 DIABETES MELLITUS WITHOUT COMPLICATION, WITHOUT LONG-TERM CURRENT USE OF INSULIN (HCC): ICD-10-CM

## 2019-04-30 DIAGNOSIS — R00.0 FAST HEART BEAT: ICD-10-CM

## 2019-04-30 PROCEDURE — 78452 HT MUSCLE IMAGE SPECT MULT: CPT | Mod: 26 | Performed by: INTERNAL MEDICINE

## 2019-04-30 PROCEDURE — 93018 CV STRESS TEST I&R ONLY: CPT | Performed by: INTERNAL MEDICINE

## 2019-04-30 PROCEDURE — A9502 TC99M TETROFOSMIN: HCPCS

## 2019-05-07 ENCOUNTER — TELEPHONE (OUTPATIENT)
Dept: CARDIOLOGY | Facility: MEDICAL CENTER | Age: 49
End: 2019-05-07

## 2019-05-07 NOTE — TELEPHONE ENCOUNTER
NM-CARDIAC STRESS TEST   Order: [id number]   Status:  Final result   Visible to patient:  Yes (Josiet)  Dx:  Fast heart beat; Type 2 diabetes panda...   Notes recorded by TITO Stevens on 5/6/2019 at 11:41 AM PDT    Please contact patient.  His stress test is negative.  We will discuss further details at his appointment.  ------     Called patient and reviewed APN recommendations.  Pt states he will have his calcium scoring completed Thursday.  Reassurance given that once results are posted, results will be relayed to APN to review, and patient will be contacted.  He verbalizes understanding and is appreciative of information given.

## 2019-05-09 ENCOUNTER — HOSPITAL ENCOUNTER (OUTPATIENT)
Dept: RADIOLOGY | Facility: MEDICAL CENTER | Age: 49
End: 2019-05-09
Attending: INTERNAL MEDICINE

## 2019-05-09 DIAGNOSIS — E11.9 TYPE 2 DIABETES MELLITUS WITHOUT COMPLICATION, WITHOUT LONG-TERM CURRENT USE OF INSULIN (HCC): ICD-10-CM

## 2019-05-09 DIAGNOSIS — E78.2 MIXED HYPERLIPIDEMIA: ICD-10-CM

## 2019-05-09 PROCEDURE — 4410556 CT-CARDIAC SCORING

## 2019-05-17 ENCOUNTER — ANTICOAGULATION MONITORING (OUTPATIENT)
Dept: VASCULAR LAB | Facility: MEDICAL CENTER | Age: 49
End: 2019-05-17

## 2019-05-17 DIAGNOSIS — D68.51 FACTOR V LEIDEN (HCC): ICD-10-CM

## 2019-05-17 NOTE — PROGRESS NOTES
Spoke with pt over phone regarding due for labwork while on Xarelto.  BMP placed.  Denies any bleeding problems.  He will go to lab next week.    Destiney VELIZ  [facility]

## 2019-05-20 ENCOUNTER — OFFICE VISIT (OUTPATIENT)
Dept: MEDICAL GROUP | Facility: PHYSICIAN GROUP | Age: 49
End: 2019-05-20
Payer: COMMERCIAL

## 2019-05-20 VITALS
RESPIRATION RATE: 16 BRPM | OXYGEN SATURATION: 94 % | DIASTOLIC BLOOD PRESSURE: 70 MMHG | BODY MASS INDEX: 26.72 KG/M2 | HEART RATE: 70 BPM | HEIGHT: 74 IN | SYSTOLIC BLOOD PRESSURE: 108 MMHG | TEMPERATURE: 98.2 F | WEIGHT: 208.2 LBS

## 2019-05-20 DIAGNOSIS — L70.0 ACNE VULGARIS: Primary | ICD-10-CM

## 2019-05-20 DIAGNOSIS — R10.12 LEFT UPPER QUADRANT PAIN: ICD-10-CM

## 2019-05-20 PROBLEM — L70.9 ACNE: Status: ACTIVE | Noted: 2019-05-20

## 2019-05-20 PROCEDURE — 99214 OFFICE O/P EST MOD 30 MIN: CPT | Performed by: FAMILY MEDICINE

## 2019-05-20 RX ORDER — OMEPRAZOLE 20 MG/1
20 CAPSULE, DELAYED RELEASE ORAL DAILY
Qty: 60 CAP | Refills: 0 | Status: SHIPPED | OUTPATIENT
Start: 2019-05-20 | End: 2019-10-01

## 2019-05-20 NOTE — PATIENT INSTRUCTIONS
For your acne:  - get a wash with either salicyclic acid or benzoyl peroxide  - pick a non-comedogenic soap (or oil-free)     For your abdominal pain:  - try gas-x after meals as needed    For high altitude:  - after 9,000 feet elevation, you will climb 2,000 feet a day with a rest day every other day for acclimatization

## 2019-05-20 NOTE — ASSESSMENT & PLAN NOTE
"This is a chronic condition.  Onset: years  Location: epigastrium to umbilicus and over LUQ  Quality: \"phantom pain\", stabbing  Associated symptoms: no nausea/vomiting, no diarrhea, no constipation, + abdominal gas - more prevalent  Radiation:  None  Treatments: healthy diet  "

## 2019-05-20 NOTE — PROGRESS NOTES
"Subjective:     CC: acne, abdominal pain    HPI:   José presents today with acne and abdominal pain.    Acne  This is a new condition.  Onset: 4-6 weeks ago  Location: back  Started jardiance ~4-6 weeks ago.   Home tx: exfoliating    Abdominal pain  This is a chronic condition.  Onset: years  Location: epigastrium to umbilicus and over LUQ  Quality: \"phantom pain\", stabbing  Associated symptoms: no nausea/vomiting, no diarrhea, no constipation, + abdominal gas - more prevalent  Radiation:  None  Treatments: healthy diet      Past Medical History:   Diagnosis Date   • Diabetes (HCC)    • Factor V deficiency (HCC)    • Hyperlipidemia        Social History   Substance Use Topics   • Smoking status: Former Smoker     Quit date: 11/3/2001   • Smokeless tobacco: Former User     Quit date: 3/1/2011   • Alcohol use No      Comment: drinks non-alcoholic beer, quit 2013       Current Outpatient Prescriptions Ordered in [facility]   Medication Sig Dispense Refill   • omeprazole (PRILOSEC) 20 MG delayed-release capsule Take 1 Cap by mouth every day. 60 Cap 0   • Milk Thistle 1000 MG Cap Take  by mouth.     • Empagliflozin-Metformin HCl ER 12.5-1000 MG TABLET SR 24 HR Take 1 Tab by mouth 2 times a day. 60 Tab 11   • Omega-3 Fatty Acids (FISH OIL) 1000 MG Cap capsule Take 1,000 mg by mouth 3 times a day, with meals.     • Blood Glucose Test Strips One Touch Ultra Test Strips.  Test once daily and with symptoms of hypoglycemia.  Diagnosis: E11.9 100 Strip 3   • Blood Glucose Monitoring Suppl Device One Touch Ultra glucometer.  Test once daily and with symptoms of hypoglycemia.  Diagnosis: E11.9 1 Device 0   • XARELTO 20 MG Tab tablet TAKE 1 TABLET BY MOUTH ONCE DAILY WITH DINNER 30 Tab 11   • BENFOTIAMINE PO Take  by mouth.     • Cholecalciferol (VITAMIN D3) 2000 units Tab Take  by mouth.     • B Complex Vitamins (B COMPLEX PO) Take  by mouth.     • BIOTIN PO Take 10,000 mg by mouth.     • rosuvastatin (CRESTOR) 5 MG Tab Take 1 Tab " "by mouth every evening. 30 Tab 11     No current [facility]-ordered facility-administered medications on file.        Allergies:  Yellow dye    Health Maintenance: Completed    ROS:  Gen: no fevers/chills, + night sweats  Pulm: no sob  CV: no chest pain  GI: no nausea/vomiting, no diarrhea    Objective:     Exam:  /70 (BP Location: Right arm, Patient Position: Sitting, BP Cuff Size: Adult)   Pulse 70   Temp 36.8 °C (98.2 °F) (Temporal)   Resp 16   Ht 1.88 m (6' 2\")   Wt 94.4 kg (208 lb 3.2 oz)   SpO2 94%   BMI 26.73 kg/m²  Body mass index is 26.73 kg/m².    Gen: Alert and oriented, No apparent distress.  Lungs: Normal effort, CTA bilaterally, no wheezes, rhonchi, or rales  CV: Regular rate and rhythm. No murmurs, rubs, or gallops.  Abd: +BS, non-distended, + TTP over epigastrium and umbilicus without rebound/guarding, no masses/organomegaly.  Ext: No clubbing, cyanosis, edema.    Assessment & Plan:     48 y.o. male with the following -     1. Acne vulgaris  This is a new condition.  For the last 4-6 weeks has been developing acne on his back.  He started Jardiance about 4-6 weeks ago and he wonders if it is a side effect.  I briefly looked at the side effect profile I do not see acne as a side effect.  The only treatment he has been doing at home is exfoliating with normal body wash.  He has noticed that it has improved with exfoliating with body wash.  -We discussed using a wash with salicylic acid or benzyl peroxide to get further treatment    2. Left upper quadrant pain  This is a chronic condition, stable.  He reports that he has had this abdominal pain for years.  Is located over the epigastrium, periumbilically and then over the left upper quadrant.  He describes as a \"phantom pain\" that is stabbing.  No associated nausea/vomiting, diarrhea, or constipation.  He does get abdominal gas and he feels like it is more prevalent and he thinks the pain may be related to the gas.  The pain does not radiate and " he has not tried any treatments other than working on a healthy diet.  His exam did show epigastrium tenderness and I wonder if there is a gastritis versus PUD component.  -Try Gas-X as needed for the abdominal gas  -Omeprazole daily for 2 months    Return in about 2 months (around 8/1/2019) for f/u abd pain, acne.    Please note that this dictation was created using voice recognition software. I have made every reasonable attempt to correct obvious errors, but I expect that there are errors of grammar and possibly content that I did not discover before finalizing the note.

## 2019-05-20 NOTE — ASSESSMENT & PLAN NOTE
This is a new condition.  Onset: 4-6 weeks ago  Location: back  Started jardiance ~4-6 weeks ago.   Home tx: exfoliating

## 2019-05-29 ENCOUNTER — TELEPHONE (OUTPATIENT)
Dept: CARDIOLOGY | Facility: MEDICAL CENTER | Age: 49
End: 2019-05-29

## 2019-05-29 NOTE — TELEPHONE ENCOUNTER
Spoke with regarding his blood work pt stated that he will have labs done 05/30/2019 prior to his apt.

## 2019-05-30 ENCOUNTER — HOSPITAL ENCOUNTER (OUTPATIENT)
Dept: LAB | Facility: MEDICAL CENTER | Age: 49
End: 2019-05-30
Attending: INTERNAL MEDICINE
Payer: COMMERCIAL

## 2019-05-30 DIAGNOSIS — E78.2 MIXED HYPERLIPIDEMIA: ICD-10-CM

## 2019-05-30 LAB
ALBUMIN SERPL BCP-MCNC: 4.9 G/DL (ref 3.2–4.9)
ALBUMIN/GLOB SERPL: 2 G/DL
ALP SERPL-CCNC: 62 U/L (ref 30–99)
ALT SERPL-CCNC: 21 U/L (ref 2–50)
ANION GAP SERPL CALC-SCNC: 12 MMOL/L (ref 0–11.9)
AST SERPL-CCNC: 19 U/L (ref 12–45)
BILIRUB SERPL-MCNC: 1.1 MG/DL (ref 0.1–1.5)
BUN SERPL-MCNC: 15 MG/DL (ref 8–22)
CALCIUM SERPL-MCNC: 9.3 MG/DL (ref 8.5–10.5)
CHLORIDE SERPL-SCNC: 105 MMOL/L (ref 96–112)
CHOLEST SERPL-MCNC: 188 MG/DL (ref 100–199)
CO2 SERPL-SCNC: 24 MMOL/L (ref 20–33)
CREAT SERPL-MCNC: 1.03 MG/DL (ref 0.5–1.4)
FASTING STATUS PATIENT QL REPORTED: NORMAL
GLOBULIN SER CALC-MCNC: 2.5 G/DL (ref 1.9–3.5)
GLUCOSE SERPL-MCNC: 124 MG/DL (ref 65–99)
HDLC SERPL-MCNC: 46 MG/DL
LDLC SERPL CALC-MCNC: 118 MG/DL
POTASSIUM SERPL-SCNC: 3.6 MMOL/L (ref 3.6–5.5)
PROT SERPL-MCNC: 7.4 G/DL (ref 6–8.2)
SODIUM SERPL-SCNC: 141 MMOL/L (ref 135–145)
TRIGL SERPL-MCNC: 118 MG/DL (ref 0–149)

## 2019-05-30 PROCEDURE — 36415 COLL VENOUS BLD VENIPUNCTURE: CPT

## 2019-05-30 PROCEDURE — 80061 LIPID PANEL: CPT

## 2019-05-30 PROCEDURE — 80053 COMPREHEN METABOLIC PANEL: CPT

## 2019-06-03 ENCOUNTER — OFFICE VISIT (OUTPATIENT)
Dept: CARDIOLOGY | Facility: MEDICAL CENTER | Age: 49
End: 2019-06-03
Payer: COMMERCIAL

## 2019-06-03 VITALS
HEART RATE: 70 BPM | DIASTOLIC BLOOD PRESSURE: 72 MMHG | OXYGEN SATURATION: 95 % | HEIGHT: 74 IN | SYSTOLIC BLOOD PRESSURE: 112 MMHG | BODY MASS INDEX: 26.44 KG/M2 | WEIGHT: 206 LBS

## 2019-06-03 DIAGNOSIS — E78.2 MIXED HYPERLIPIDEMIA: ICD-10-CM

## 2019-06-03 DIAGNOSIS — I25.10 CORONARY ARTERY CALCIFICATION: ICD-10-CM

## 2019-06-03 DIAGNOSIS — R00.0 FAST HEART BEAT: Primary | ICD-10-CM

## 2019-06-03 DIAGNOSIS — I25.84 CORONARY ARTERY CALCIFICATION: ICD-10-CM

## 2019-06-03 DIAGNOSIS — D68.51 FACTOR V LEIDEN (HCC): ICD-10-CM

## 2019-06-03 PROCEDURE — 99214 OFFICE O/P EST MOD 30 MIN: CPT | Performed by: NURSE PRACTITIONER

## 2019-06-03 RX ORDER — ROSUVASTATIN CALCIUM 5 MG/1
5 TABLET, COATED ORAL EVERY EVENING
Qty: 90 TAB | Refills: 3 | Status: SHIPPED | OUTPATIENT
Start: 2019-06-03 | End: 2019-12-09

## 2019-06-03 ASSESSMENT — ENCOUNTER SYMPTOMS
COUGH: 0
ABDOMINAL PAIN: 0
ORTHOPNEA: 0
CLAUDICATION: 0
DIZZINESS: 0
WEAKNESS: 0
PND: 0
MYALGIAS: 0
SHORTNESS OF BREATH: 0
PALPITATIONS: 0

## 2019-06-03 NOTE — PROGRESS NOTES
Chief Complaint   Patient presents with   • Palpitations     follow up TM stress , CT , lab results       Subjective:   José Bray is a 48 y.o. male who presents today with his wife, Chanell, to follow-up on a sense of rapid heart rate during exercise.  He was seen by Dr Rojas on April 17, 2019.  Stress test, coronary calcification study and labs were ordered.  Patient was started on Crestor 5 mg and opted to take it every other day.    He and his wife are training to climb [facility] in [facility] in 2020.  He has been training very hard at the gym doing strenuous interval training.  He felt his heart rate was too high.  He also complained of some chest tightness with high and exercise.    He is a type II diabetic and previously weighed 300 pounds.  He has family history of his father at age 61.  His father is still alive in his 70s.    Past Medical History:   Diagnosis Date   • Diabetes (HCC)    • Factor V deficiency (HCC)    • Hyperlipidemia      Past Surgical History:   Procedure Laterality Date   • TONSILLECTOMY       Family History   Problem Relation Age of Onset   • Cancer Mother         melanoma   • Heart Disease Father 61        MI, stent   • Diabetes Maternal Grandmother    • Hyperlipidemia Paternal Grandmother    • Heart Disease Paternal Grandfather         valve problems   • Alcohol/Drug Neg Hx      Social History     Social History   • Marital status:      Spouse name: N/A   • Number of children: N/A   • Years of education: N/A     Occupational History   • Not on file.     Social History Main Topics   • Smoking status: Former Smoker     Quit date: 11/3/2001   • Smokeless tobacco: Former User     Quit date: 3/1/2011   • Alcohol use No      Comment: drinks non-alcoholic beer   • Drug use: No   • Sexual activity: Yes     Partners: Female     Other Topics Concern   • Not on file     Social History Narrative   • No narrative on file     Allergies   Allergen Reactions   • Yellow Dye  Hives and Diarrhea     Hives, diarrhea      Outpatient Encounter Prescriptions as of 6/3/2019   Medication Sig Dispense Refill   • rosuvastatin (CRESTOR) 5 MG Tab Take 1 Tab by mouth every evening. 90 Tab 3   • rivaroxaban (XARELTO) 20 MG Tab tablet Take 1 Tab by mouth with dinner. 90 Tab 3   • omeprazole (PRILOSEC) 20 MG delayed-release capsule Take 1 Cap by mouth every day. 60 Cap 0   • Empagliflozin-Metformin HCl ER 12.5-1000 MG TABLET SR 24 HR Take 1 Tab by mouth 2 times a day. 60 Tab 11   • Omega-3 Fatty Acids (FISH OIL) 1000 MG Cap capsule Take 1,000 mg by mouth 3 times a day, with meals.     • BENFOTIAMINE PO Take  by mouth.     • Cholecalciferol (VITAMIN D3) 2000 units Tab Take  by mouth.     • B Complex Vitamins (B COMPLEX PO) Take  by mouth.     • [DISCONTINUED] Milk Thistle 1000 MG Cap Take  by mouth.     • [DISCONTINUED] rosuvastatin (CRESTOR) 5 MG Tab Take 1 Tab by mouth every evening. (Patient taking differently: Take 5 mg by mouth every 48 hours.) 30 Tab 11   • Blood Glucose Test Strips One Touch Ultra Test Strips.  Test once daily and with symptoms of hypoglycemia.  Diagnosis: E11.9 100 Strip 3   • Blood Glucose Monitoring Suppl Device One Touch Ultra glucometer.  Test once daily and with symptoms of hypoglycemia.  Diagnosis: E11.9 1 Device 0   • [DISCONTINUED] XARELTO 20 MG Tab tablet TAKE 1 TABLET BY MOUTH ONCE DAILY WITH DINNER 30 Tab 11   • [DISCONTINUED] BIOTIN PO Take 10,000 mg by mouth.       No facility-administered encounter medications on file as of 6/3/2019.      Review of Systems   Constitutional: Negative for malaise/fatigue.   Respiratory: Negative for cough and shortness of breath.    Cardiovascular: Positive for chest pain (Some chest tightness at max heart rate.). Negative for palpitations, orthopnea, claudication, leg swelling and PND.   Gastrointestinal: Negative for abdominal pain.   Musculoskeletal: Negative for myalgias.   Neurological: Negative for dizziness and weakness.  "       Objective:   /72 (BP Location: Left arm, Patient Position: Sitting)   Pulse 70   Ht 1.88 m (6' 2\")   Wt 93.4 kg (206 lb)   SpO2 95%   BMI 26.45 kg/m²     Physical Exam   Constitutional: He is oriented to person, place, and time. He appears well-developed and well-nourished.   HENT:   Head: Normocephalic.   Eyes: EOM are normal.   Neck: Normal range of motion. No JVD present.   Cardiovascular: Regular rhythm.  Bradycardia present.  Exam reveals no friction rub.    No murmur heard.  Probable athletic heart.   Pulmonary/Chest: Effort normal.   Abdominal: Soft. Bowel sounds are normal.   Musculoskeletal: He exhibits no edema.   Neurological: He is alert and oriented to person, place, and time.   Skin: Skin is warm and dry.   Psychiatric: He has a normal mood and affect.     Results for VIMAL GARCIA (MRN [id number])    Ref. Range 5/30/2019 06:45   Sodium Latest Ref Range: 135 - 145 mmol/L 141   Potassium Latest Ref Range: 3.6 - 5.5 mmol/L 3.6   Chloride Latest Ref Range: 96 - 112 mmol/L 105   Co2 Latest Ref Range: 20 - 33 mmol/L 24   Anion Gap Latest Ref Range: 0.0 - 11.9  12.0 (H)   Glucose Latest Ref Range: 65 - 99 mg/dL 124 (H)   Bun Latest Ref Range: 8 - 22 mg/dL 15   Creatinine Latest Ref Range: 0.50 - 1.40 mg/dL 1.03   GFR If  Latest Ref Range: >60 mL/min/1.73 m 2 >60   GFR If Non  Latest Ref Range: >60 mL/min/1.73 m 2 >60   Calcium Latest Ref Range: 8.5 - 10.5 mg/dL 9.3   AST(SGOT) Latest Ref Range: 12 - 45 U/L 19   ALT(SGPT) Latest Ref Range: 2 - 50 U/L 21   Alkaline Phosphatase Latest Ref Range: 30 - 99 U/L 62   Total Bilirubin Latest Ref Range: 0.1 - 1.5 mg/dL 1.1   Albumin Latest Ref Range: 3.2 - 4.9 g/dL 4.9   Total Protein Latest Ref Range: 6.0 - 8.2 g/dL 7.4   Globulin Latest Ref Range: 1.9 - 3.5 g/dL 2.5   A-G Ratio Latest Units: g/dL 2.0   Fasting Status Unknown Fasting   Cholesterol,Tot Latest Ref Range: 100 - 199 mg/dL 188   Triglycerides " Latest Ref Range: 0 - 149 mg/dL 118   HDL Latest Ref Range: >=40 mg/dL 46   LDL Latest Ref Range: <100 mg/dL 118 (H)       May 9, 2019: Coronary calcification:  LMA - 0.0  LCX - 4.9  LAD - 67.2  RCA - 17.8  PDA - 0.0   total Calcium Score: 90      April 30, 2019: Myocardial Perfusion  Report   NUCLEAR IMAGING INTERPRETATION   * Small sized, mild to moderate severity, non-reversible defect in the apex  and distal inferior wall.  SSS of 3. This is possibly artifact due to normal  wall motion in this area.    * No reversible ischemia.    * Normal left ventricular size, ejection fraction, and wall motion.   ECG INTERPRETATION   Negative stress ECG for ischemia.    Assessment:     1. Fast heart beat     2. Mixed hyperlipidemia  rosuvastatin (CRESTOR) 5 MG Tab    LIPID PANEL   3. Coronary artery calcification     4. Factor V Leiden (HCC)  rivaroxaban (XARELTO) 20 MG Tab tablet       Medical Decision Making:  Today's Assessment / Status / Plan:     Fast heartbeat: He has sensation that his heart was going to pass which was probably due to high and exercise.  He has no ischemia on his stress test.  I have suggested he not exercise for long periods of time at the upper end of his heart rate range.    Hyperlipidemia: He has been taking the Crestor 5 mg every other day.  His LDL should be less than 100 preferably 70.  I would like him to take the Crestor daily and he is agreeable.  We will check labs in 3 months.    Coronary calcification: His score is 90.  He does have some calcification which we are hoping to prevent progression.  We should shoot for an LDL score of 70 or less.    Factor V Leiden: Patient will be on lifelong anticoagulation.  I will refill his Xarelto.    He does not need to be followed in cardiology as he does not have a cardiac event.  He is welcome to follow-up in 1 year but can be followed by his primary care.    Collaborating Provider: Dr Sierra Vila.    Please note that this dictation was created using  voice recognition software. I have made every reasonable attempt to correct obvious errors, but it is possible there are errors of grammar and possibly content that I did not discover before finalizing the note.

## 2019-06-18 ENCOUNTER — ANTICOAGULATION MONITORING (OUTPATIENT)
Dept: VASCULAR LAB | Facility: MEDICAL CENTER | Age: 49
End: 2019-06-18

## 2019-06-18 DIAGNOSIS — D68.51 FACTOR V LEIDEN (HCC): ICD-10-CM

## 2019-06-18 NOTE — PROGRESS NOTES
Chart reviewed in accordance with [facility] protocol.  Pt recently seen by cardiology who refilled his Xarelto.  Recent CrCl= 114.6 ml/min  Continue current dosage of Xarelto.  Follow up in 6 months.    Destiney VELIZ  [facility]

## 2019-07-24 ENCOUNTER — OFFICE VISIT (OUTPATIENT)
Dept: MEDICAL GROUP | Facility: PHYSICIAN GROUP | Age: 49
End: 2019-07-24
Payer: COMMERCIAL

## 2019-07-24 VITALS
RESPIRATION RATE: 16 BRPM | BODY MASS INDEX: 26.56 KG/M2 | WEIGHT: 207 LBS | HEART RATE: 88 BPM | SYSTOLIC BLOOD PRESSURE: 102 MMHG | OXYGEN SATURATION: 94 % | DIASTOLIC BLOOD PRESSURE: 72 MMHG | TEMPERATURE: 99.3 F | HEIGHT: 74 IN

## 2019-07-24 DIAGNOSIS — E11.9 TYPE 2 DIABETES MELLITUS WITHOUT COMPLICATION, WITHOUT LONG-TERM CURRENT USE OF INSULIN (HCC): ICD-10-CM

## 2019-07-24 DIAGNOSIS — R10.12 LEFT UPPER QUADRANT PAIN: Primary | ICD-10-CM

## 2019-07-24 DIAGNOSIS — R09.89 SINUS COMPLAINT: ICD-10-CM

## 2019-07-24 DIAGNOSIS — K62.89 RECTAL PAIN: ICD-10-CM

## 2019-07-24 PROCEDURE — 99214 OFFICE O/P EST MOD 30 MIN: CPT | Performed by: FAMILY MEDICINE

## 2019-07-24 NOTE — ASSESSMENT & PLAN NOTE
This is a new condition.  Onset: 2 days ago  Quality: temple headaches, myalgia, rhinorrhea, sore throat, cough, ear pressure/popping  Sick contacts: yes - wife, recently traveled to [place] and [place]  Home treatments: cassy-seltzer, menthol, antihistamine

## 2019-07-24 NOTE — ASSESSMENT & PLAN NOTE
This is a chronic condition.  Current medications: empagliflozin-metformin 12.5-1000 mg bid  Last A1c: 6.7% (3/2019)  Last diabetic foot exam: today  Last retinal eye exam: 4/2019  ACEi/ARB: no  Statin: rosuvastatin  Aspirin: n/a  Concomitant HTN: at goal  Nightly foot checks: yes

## 2019-07-24 NOTE — PATIENT INSTRUCTIONS
My recommendations for an upper respiratory infection:  - Use a humidifier, especially at night. Cold or warm water humidifiers have the same effect.  - Umberto Med squeeze bottle sinus rinses twice a day.  - Hot tea + honey + fresh lemon juice  - Throat Coat tea  - Honey by itself has been shown to help fight bugs and provide cough relief  - Chicken noodle soup has also been shown to help fight bugs  - Sudafed behind the pharmacy counter    For the acne:  - wash with benzoyl peroxide 2.5-5%    For the abdominal pain:  - try Gas-X

## 2019-07-24 NOTE — ASSESSMENT & PLAN NOTE
"This is a chronic condition.  For years he has had pain over the epigastrium that would radiate to the umbilicus and over the left upper quadrant.  He describes the pain as a \"phantom pain\" and stabbing.  No associated GI symptoms other than abdominal gas pain.  At last appointment I prescribed omeprazole for 2 months and recommended to use simethicone as needed for the abdominal gas pain. He is no longer getting the stabbing abdominal pain as much as he used to. He does get pain in his rectum (cramping) and radiates into the testicles. It will occur when he has the urge for a BM but also will occur with prolonged sitting. Relieved by defecation. No blood in your stool, no pain with defecation unless hard stool.  "

## 2019-07-24 NOTE — LETTER
[facility]  Carolina Adkins M.D.  [address]  [place] [address]  Fax: [phone]   Authorization for Release/Disclosure of   Protected Health Information   Name: JOSÉ BRAY : 1970 SSN: [id number]   Address: [address] [place] [address]  [place] [address] Phone:    [phone] (home) [phone] (work)   I authorize the entity listed below to release/disclose the PHI below to:   [facility]/Carolina Adkins M.D. and Carolina Adkins M.D.   Provider or Entity Name:     Address   City, State, Zip   Phone:      Fax:     Reason for request: continuity of care   Information to be released:    [  ] LAST COLONOSCOPY,  including any PATH REPORT and follow-up  [  ] LAST FIT/COLOGUARD RESULT [  ] LAST DEXA  [  ] LAST MAMMOGRAM  [  ] LAST PAP  [  ] LAST LABS [  ] RETINA EXAM REPORT  [  ] IMMUNIZATION RECORDS  [  ] Release all info      [  ] Check here and initial the line next to each item to release ALL health information INCLUDING  _____ Care and treatment for drug and / or alcohol abuse  _____ HIV testing, infection status, or AIDS  _____ Genetic Testing    DATES OF SERVICE OR TIME PERIOD TO BE DISCLOSED: _____________  I understand and acknowledge that:  * This Authorization may be revoked at any time by you in writing, except if your health information has already been used or disclosed.  * Your health information that will be used or disclosed as a result of you signing this authorization could be re-disclosed by the recipient. If this occurs, your re-disclosed health information may no longer be protected by State or Federal laws.  * You may refuse to sign this Authorization. Your refusal will not affect your ability to obtain treatment.  * This Authorization becomes effective upon signing and will  on (date) __________.      If no date is indicated, this Authorization will  one (1) year from the signature date.    Name: José Bray    Signature:   Date:          7/24/2019       PLEASE FAX REQUESTED RECORDS BACK TO: [phone]

## 2019-08-17 ENCOUNTER — HOSPITAL ENCOUNTER (OUTPATIENT)
Dept: LAB | Facility: MEDICAL CENTER | Age: 49
End: 2019-08-17
Attending: FAMILY MEDICINE
Payer: COMMERCIAL

## 2019-08-17 DIAGNOSIS — K62.89 RECTAL PAIN: ICD-10-CM

## 2019-08-17 DIAGNOSIS — E11.9 TYPE 2 DIABETES MELLITUS WITHOUT COMPLICATION, WITHOUT LONG-TERM CURRENT USE OF INSULIN (HCC): ICD-10-CM

## 2019-08-17 LAB
ANION GAP SERPL CALC-SCNC: 10 MMOL/L (ref 0–11.9)
BASOPHILS # BLD AUTO: 1.5 % (ref 0–1.8)
BASOPHILS # BLD: 0.08 K/UL (ref 0–0.12)
BUN SERPL-MCNC: 17 MG/DL (ref 8–22)
CALCIUM SERPL-MCNC: 9.8 MG/DL (ref 8.5–10.5)
CHLORIDE SERPL-SCNC: 107 MMOL/L (ref 96–112)
CO2 SERPL-SCNC: 24 MMOL/L (ref 20–33)
CREAT SERPL-MCNC: 1.02 MG/DL (ref 0.5–1.4)
CREAT UR-MCNC: 147.3 MG/DL
EOSINOPHIL # BLD AUTO: 0.09 K/UL (ref 0–0.51)
EOSINOPHIL NFR BLD: 1.7 % (ref 0–6.9)
ERYTHROCYTE [DISTWIDTH] IN BLOOD BY AUTOMATED COUNT: 43.6 FL (ref 35.9–50)
ERYTHROCYTE [SEDIMENTATION RATE] IN BLOOD BY WESTERGREN METHOD: 1 MM/HOUR (ref 0–15)
FASTING STATUS PATIENT QL REPORTED: NORMAL
GLUCOSE SERPL-MCNC: 152 MG/DL (ref 65–99)
HCT VFR BLD AUTO: 48.2 % (ref 42–52)
HGB BLD-MCNC: 16.7 G/DL (ref 14–18)
IMM GRANULOCYTES # BLD AUTO: 0.01 K/UL (ref 0–0.11)
IMM GRANULOCYTES NFR BLD AUTO: 0.2 % (ref 0–0.9)
LYMPHOCYTES # BLD AUTO: 2.04 K/UL (ref 1–4.8)
LYMPHOCYTES NFR BLD: 37.7 % (ref 22–41)
MCH RBC QN AUTO: 31.9 PG (ref 27–33)
MCHC RBC AUTO-ENTMCNC: 34.6 G/DL (ref 33.7–35.3)
MCV RBC AUTO: 92 FL (ref 81.4–97.8)
MICROALBUMIN UR-MCNC: <0.7 MG/DL
MICROALBUMIN/CREAT UR: NORMAL MG/G (ref 0–30)
MONOCYTES # BLD AUTO: 0.5 K/UL (ref 0–0.85)
MONOCYTES NFR BLD AUTO: 9.2 % (ref 0–13.4)
NEUTROPHILS # BLD AUTO: 2.69 K/UL (ref 1.82–7.42)
NEUTROPHILS NFR BLD: 49.7 % (ref 44–72)
NRBC # BLD AUTO: 0 K/UL
NRBC BLD-RTO: 0 /100 WBC
PLATELET # BLD AUTO: 196 K/UL (ref 164–446)
PMV BLD AUTO: 11.6 FL (ref 9–12.9)
POTASSIUM SERPL-SCNC: 4.1 MMOL/L (ref 3.6–5.5)
RBC # BLD AUTO: 5.24 M/UL (ref 4.7–6.1)
SODIUM SERPL-SCNC: 141 MMOL/L (ref 135–145)
WBC # BLD AUTO: 5.4 K/UL (ref 4.8–10.8)

## 2019-08-17 PROCEDURE — 85652 RBC SED RATE AUTOMATED: CPT

## 2019-08-17 PROCEDURE — 80048 BASIC METABOLIC PNL TOTAL CA: CPT

## 2019-08-17 PROCEDURE — 36415 COLL VENOUS BLD VENIPUNCTURE: CPT

## 2019-08-17 PROCEDURE — 82570 ASSAY OF URINE CREATININE: CPT

## 2019-08-17 PROCEDURE — 85025 COMPLETE CBC W/AUTO DIFF WBC: CPT

## 2019-08-17 PROCEDURE — 82043 UR ALBUMIN QUANTITATIVE: CPT

## 2019-09-24 ENCOUNTER — APPOINTMENT (OUTPATIENT)
Dept: MEDICAL GROUP | Facility: PHYSICIAN GROUP | Age: 49
End: 2019-09-24
Payer: COMMERCIAL

## 2019-10-01 ENCOUNTER — OFFICE VISIT (OUTPATIENT)
Dept: MEDICAL GROUP | Facility: PHYSICIAN GROUP | Age: 49
End: 2019-10-01
Payer: COMMERCIAL

## 2019-10-01 VITALS
BODY MASS INDEX: 26.87 KG/M2 | TEMPERATURE: 98.2 F | SYSTOLIC BLOOD PRESSURE: 102 MMHG | HEIGHT: 74 IN | WEIGHT: 209.4 LBS | OXYGEN SATURATION: 97 % | DIASTOLIC BLOOD PRESSURE: 72 MMHG | RESPIRATION RATE: 16 BRPM | HEART RATE: 70 BPM

## 2019-10-01 DIAGNOSIS — M54.6 ACUTE LEFT-SIDED THORACIC BACK PAIN: Primary | ICD-10-CM

## 2019-10-01 DIAGNOSIS — Z23 NEED FOR VACCINATION: ICD-10-CM

## 2019-10-01 DIAGNOSIS — R10.10 PAIN OF UPPER ABDOMEN: ICD-10-CM

## 2019-10-01 PROBLEM — R20.0 ARM NUMBNESS LEFT: Status: RESOLVED | Noted: 2018-05-11 | Resolved: 2019-10-01

## 2019-10-01 PROBLEM — M54.9 BACK PAIN: Status: ACTIVE | Noted: 2019-10-01

## 2019-10-01 PROBLEM — S91.109A OPEN TOE WOUND: Status: RESOLVED | Noted: 2018-09-07 | Resolved: 2019-10-01

## 2019-10-01 PROCEDURE — 90686 IIV4 VACC NO PRSV 0.5 ML IM: CPT | Performed by: FAMILY MEDICINE

## 2019-10-01 PROCEDURE — 99214 OFFICE O/P EST MOD 30 MIN: CPT | Mod: 25 | Performed by: FAMILY MEDICINE

## 2019-10-01 PROCEDURE — 90471 IMMUNIZATION ADMIN: CPT | Performed by: FAMILY MEDICINE

## 2019-10-01 NOTE — ASSESSMENT & PLAN NOTE
"This is a chronic condition. He continues to get intermittent upper abdominal pain. He still describe it as a \"phantom pain\" and stabbing. It will improve with defecation or gas release. Occasionally he still gets rectal pain that radiate his to testicles. No blood in his stools. He has had endoscopy and colonoscopy in 2012 for these symptoms. Only result is ileitis. At that time he was taking a lot of NSAIDs as he was an avid runner. After that result, he stopped taking all NSAIDs.   "

## 2019-10-01 NOTE — PROGRESS NOTES
"Subjective:     CC: back pain, f/u abd pain    HPI:   José presents today with back and abdominal pain.    Back pain  This is a new condition.  Onset: 1 week  Location: left side, mid back, flank  Duration: constant, though better  Quality: burning, electrical  Modifying factors: better - hot yoga, massage;   Precipitating trauma: none  Associated symptoms: +lateral left hip pain, no nausea/vomiting, no fevers/chills, no dysuria, no bowel/bladder incontinence, no saddle anesthesia, no hematuria  Home treatments: chiropractor - hasn't seen for awhile      Abdominal pain  This is a chronic condition. He continues to get intermittent upper abdominal pain. He still describe it as a \"phantom pain\" and stabbing. It will improve with defecation or gas release. Occasionally he still gets rectal pain that radiate his to testicles. No blood in his stools. He has had endoscopy and colonoscopy in 2012 for these symptoms. Only result is ileitis. At that time he was taking a lot of NSAIDs as he was an avid runner. After that result, he stopped taking all NSAIDs.       Past Medical History:   Diagnosis Date   • Diabetes (HCC)    • Factor V deficiency (HCC)    • Hyperlipidemia        Social History     Tobacco Use   • Smoking status: Former Smoker     Last attempt to quit: 11/3/2001     Years since quittin.9   • Smokeless tobacco: Former User     Quit date: 3/1/2011   Substance Use Topics   • Alcohol use: No     Alcohol/week: 0.0 oz     Comment: drinks non-alcoholic beer   • Drug use: No       Current Outpatient Medications Ordered in Epic   Medication Sig Dispense Refill   • Multiple Vitamins-Minerals (MULTIVITAMIN ADULTS 50+ PO)      • rosuvastatin (CRESTOR) 5 MG Tab Take 1 Tab by mouth every evening. 90 Tab 3   • rivaroxaban (XARELTO) 20 MG Tab tablet Take 1 Tab by mouth with dinner. 90 Tab 3   • Empagliflozin-Metformin HCl ER 12.5-1000 MG TABLET SR 24 HR Take 1 Tab by mouth 2 times a day. 60 Tab 11   • Omega-3 Fatty " "Acids (FISH OIL) 1000 MG Cap capsule Take 1,000 mg by mouth 3 times a day, with meals.     • Blood Glucose Test Strips One Touch Ultra Test Strips.  Test once daily and with symptoms of hypoglycemia.  Diagnosis: E11.9 100 Strip 3   • Blood Glucose Monitoring Suppl Device One Touch Ultra glucometer.  Test once daily and with symptoms of hypoglycemia.  Diagnosis: E11.9 1 Device 0   • BENFOTIAMINE PO Take  by mouth.     • Cholecalciferol (VITAMIN D3) 2000 units Tab Take  by mouth.       No current Epic-ordered facility-administered medications on file.        Allergies:  Yellow dye    Health Maintenance: Completed    ROS:  Gen: no fevers/chills  Pulm: no sob   CV: no chest pain      Objective:     Exam:  /72 (BP Location: Left arm, Patient Position: Sitting, BP Cuff Size: Adult)   Pulse 70   Temp 36.8 °C (98.2 °F) (Temporal)   Resp 16   Ht 1.88 m (6' 2\")   Wt 95 kg (209 lb 6.4 oz)   SpO2 97%   BMI 26.89 kg/m²  Body mass index is 26.89 kg/m².    Gen: Alert and oriented, No apparent distress.  Neck: Neck is supple without lymphadenopathy.  Lungs: Normal effort, CTA bilaterally, no wheezes, rhonchi, or rales  CV: Regular rate and rhythm. No murmurs, rubs, or gallops.  Ext: No clubbing, cyanosis, edema.  Back:  Full ROM, 5/5 LE strength, sensation intact bilaterally in LE, no TTP over spinous processes, paraspinals tight on the left and slightly tender, SI joint tender on the left, straight leg raise negative, Murtaza test negative, TTP over left iliopsoas tendon      Assessment & Plan:     49 y.o. male with the following -     1. Acute left-sided thoracic back pain  This is a new condition.  For the last week he has had a constant pain in the left side of his back kind of near the flank/mid back.  It is little bit better today after doing hot yoga yesterday.  He would describe it as a burning/electrical pain and cannot recall any precipitating trauma.  He also has some associated lateral left hip pain but no " "associated nausea/vomiting, fever/chills, dysuria, bowel/bladder incontinence, saddle anesthesia, or hematuria.  He does see her chiropractor regularly but has not seen them for a while and wonders if that is why he is in pain.  On exam his left paraspinals are tender and he is got tenderness of the left SI joint and he is tender over the iliopsoas tendon in the left hip.  - REFERRAL TO PHYSICAL THERAPY Reason for Therapy: Eval/Treat/Report    2. Pain of upper abdomen  This is a chronic condition, unchanged.  He continues to get intermittent upper abdominal pain.  He has had it for years and he describes it as a \"phantom pain\" and stabbing.  Will improve with defecation or gas release.  Occasionally he still gets rectal pain that radiates to his testicles.  He has no blood in his stools.  He did have an endoscopy and colonoscopy 2012 for the symptoms and at that time the only result was ileitis.  He had been using a lot of ibuprofen because he is an avid runner so he stopped using all NSAIDs after that result.  Recently checked a CBC, BMP, and ESR all of which were normal.  He states he is can try different diet in case diet is contributing to his symptoms.  We briefly discussed FODMAP as a possibility for an elimination diet to see if there are certain foods that trigger symptoms.    3. Need for vaccination  - Influenza Vaccine Quad Injection (PF)      Return in about 2 months (around 12/16/2019) for f/u back pain, PT.    Please note that this dictation was created using voice recognition software. I have made every reasonable attempt to correct obvious errors, but I expect that there are errors of grammar and possibly content that I did not discover before finalizing the note.      "

## 2019-10-01 NOTE — ASSESSMENT & PLAN NOTE
This is a new condition.  Onset: 1 week  Location: left side, mid back, flank  Duration: constant, though better  Quality: burning, electrical  Modifying factors: better - hot yoga, massage;   Precipitating trauma: none  Associated symptoms: +lateral left hip pain, no nausea/vomiting, no fevers/chills, no dysuria, no bowel/bladder incontinence, no saddle anesthesia, no hematuria  Home treatments: chiropractor - hasn't seen for awhile

## 2019-10-03 ENCOUNTER — HEALTH MAINTENANCE LETTER (OUTPATIENT)
Age: 49
End: 2019-10-03

## 2019-10-29 ENCOUNTER — PHYSICAL THERAPY (OUTPATIENT)
Dept: PHYSICAL THERAPY | Facility: REHABILITATION | Age: 49
End: 2019-10-29
Attending: FAMILY MEDICINE
Payer: COMMERCIAL

## 2019-10-29 DIAGNOSIS — M54.6 ACUTE LEFT-SIDED THORACIC BACK PAIN: ICD-10-CM

## 2019-10-29 PROCEDURE — 97535 SELF CARE MNGMENT TRAINING: CPT

## 2019-10-29 PROCEDURE — 97162 PT EVAL MOD COMPLEX 30 MIN: CPT

## 2019-10-29 PROCEDURE — 97110 THERAPEUTIC EXERCISES: CPT

## 2019-10-29 ASSESSMENT — ENCOUNTER SYMPTOMS
ALLEVIATING FACTORS: STRETCHING
PAIN SCALE: 5
PAIN SCALE AT LOWEST: 5
PAIN TIMING: CONSTANT
QUALITY: TIGHT
PAIN SCALE AT HIGHEST: 5
EXACERBATED BY: SITTING

## 2019-10-29 NOTE — OP THERAPY EVALUATION
Outpatient Physical Therapy  INITIAL EVALUATION    [facility]  [address], [address]  [place] [address]  Phone:  [phone]    Date of Evaluation: 10/29/2019    Patient: José Bray  YOB: 1970  MRN: [id number]     Referring Provider: Carolina Adkins M.D.  [address]  [place] [address]   Referring Diagnosis Acute left-sided thoracic back pain [M54.6]     Time Calculation  Start time: 0400  Stop time: 0500 Time Calculation (min): 60 minutes       Physical Therapy Occurrence Codes    Date of onset of impairment:  19   Date physical therapy care plan established or reviewed:  10/29/19   Date physical therapy treatment started:  10/29/19          Chief Complaint: No chief complaint on file.    Visit Diagnoses     ICD-10-CM   1. Acute left-sided thoracic back pain M54.6         Subjective:   History of Present Illness:     Mechanism of injury:  Left anterior hip pain. Sudden onset, no known moment of injury. The pain began when I stopped working out. Symptoms began around 2019. More recently the pain has developed onto R. Hip/groin region. Made worse: single and double knee to chest stretching and especially when lowering legs. Able to walk without pain. Not bothered by short durations of running. Been increasing yoga participation, which helps reduce symptoms. Increased sitting and sedentary, less hiking.   Having increased amount of L. Lower back pain. Wondering if it's related to hip pain.    Pain:     Current pain ratin    At best pain ratin    At worst pain ratin    Location:  Anterior hip and groin, intermittent travels down inner thigh    Quality:  Tight    Pain timing:  Constant    Relieving factors:  Stretching    Aggravating factors:  Sitting  Diagnostic Tests:     None    Treatments:     Current treatment:  Yoga  Patient Goals:     Patient goals for therapy:  Increased motion    Other patient goals:  Identify  nature of symptoms; reduce tightness in hip       Past Medical History:   Diagnosis Date   • Diabetes (HCC)    • Factor V deficiency (HCC)    • Hyperlipidemia      Past Surgical History:   Procedure Laterality Date   • TONSILLECTOMY       Social History     Tobacco Use   • Smoking status: Former Smoker     Last attempt to quit: 11/3/2001     Years since quittin.9   • Smokeless tobacco: Former User     Quit date: 3/1/2011   Substance Use Topics   • Alcohol use: No     Alcohol/week: 0.0 oz     Comment: drinks non-alcoholic beer     Family and Occupational History     Socioeconomic History   • Marital status:      Spouse name: Not on file   • Number of children: Not on file   • Years of education: Not on file   • Highest education level: Not on file   Occupational History   • Not on file       Objective     Palpation   Left   Tenderness of the adductor brevis and iliopsoas.     Additional Palpation Details  Psoas major TTP     Active Range of Motion   Left Hip   Flexion: WFL and with pain  Extension: WFL  External rotation (90/90): WFL  Internal rotation (90/90): WFL and with pain    Passive Range of Motion   Left Hip   Normal passive range of motion    Strength:      Left Hip   Planes of Motion   Flexion: 3  Internal rotation: 3+    Tests     Left Hip   Positive FADIR.   Negative LANG and scour.         Therapeutic Exercises (CPT 39792):     1. Diaphragm breathing    2. Supine pelvic tilt with double knee to chest (bent knee's)    3. Table top alternating arm flexion    Therapeutic Treatments and Modalities:     1. Functional Training, Self Care (CPT 93197), hip, HEP; yoga participation guidence to avoid hip flexor strain;     Time-based treatments/modalities:  Therapeutic exercise minutes (CPT 96036): 15 minutes  Functional training, self care minutes (CPT 31025): 15 minutes       Assessment, Response and Plan:   Impairments: abnormal or restricted ROM, impaired physical strength and pain with function     Assessment details:  Mr. Bray is attending PT with chief complaint of anterior left hip pain of insidious onset. This pain has been going on since April 2019. He has had change in activity during this time, increased yoga participation and decreased weight training and hiking. He has also been more sedentary, sitting more. He had signs and symptoms that aligned very well with a left hip flexor strain, possibly from over stretching in yoga. Patient can benefit from PT for progressive hip flexor strengthening.   Barriers to therapy:  None  Prognosis: good    Goals:   Short Term Goals:   Improved hip flexor strength to 4-/5  Improved hip flexion and internal rotation AROM  Reduction in tenderness with palpation to left psoas major   Short term goal time span:  2-4 weeks      Long Term Goals:    Full hip AROM that is pain free  5/5 hip strength  No tenderness with palpation to psoas major   Able to diaphragm breath without pain in lumbar spine   Long term goal time span:  6-8 weeks    Plan:   Planned therapy interventions:  E Stim Unattended (CPT 71666), Functional Training, Self Care (CPT 34245), Hot or Cold Pack Therapy (CPT 44533), Manual Therapy (CPT 52426), Neuromuscular Re-education (CPT 64301), Therapeutic Exercise (CPT 31288), Self Care ADL Training (CPT 60033) and Therapeutic Activities (CPT 16275)  Plan details:  1x week every 2 weeks for about 10 weeks       Functional Assessment Used        Referring provider co-signature:  I have reviewed this plan of care and my co-signature certifies the need for services.  Certification Dates:   From 10/29/19   To 1/15/20    Physician Signature: ________________________________ Date: ______________

## 2019-11-04 ENCOUNTER — PHYSICAL THERAPY (OUTPATIENT)
Dept: PHYSICAL THERAPY | Facility: REHABILITATION | Age: 49
End: 2019-11-04
Attending: FAMILY MEDICINE
Payer: COMMERCIAL

## 2019-11-04 DIAGNOSIS — M54.50 ACUTE BILATERAL LOW BACK PAIN WITHOUT SCIATICA: ICD-10-CM

## 2019-11-04 PROCEDURE — 97535 SELF CARE MNGMENT TRAINING: CPT

## 2019-11-04 PROCEDURE — 97110 THERAPEUTIC EXERCISES: CPT

## 2019-11-04 PROCEDURE — 97140 MANUAL THERAPY 1/> REGIONS: CPT

## 2019-11-04 NOTE — OP THERAPY DAILY TREATMENT
Outpatient Physical Therapy  DAILY TREATMENT     [facility] [address], [address]  [place] [address]  Phone:  [phone]    Date: 11/04/2019    Patient: José Bray  YOB: 1970  MRN: [id number]     Time Calculation  Start time: 0732  Stop time: 0811 Time Calculation (min): 39 minutes       Chief Complaint: No chief complaint on file.    Visit #: 2    SUBJECTIVE:  Pain in the back of hips that goes into low back. Did yoga over the weekend and did a lot of forward folds and I was working on trying to improve flexibility.     OBJECTIVE:  Current objective measures:   Pain exacerabated lower lumbar with flexion but not extension  Tight hamstrings  Glut stretch well tolerated in supine  Lumbar not hip flexion with stable lumbar reproduces sx's   TTP lumbar paraspinals and superspinous lig.  TTP sacrum at lateral border and posterior SIJ ligaments             Therapeutic Exercises (CPT 76360):     1. Supine hs stretching with strap    2. Supine glute stretching with ball    3. Supine piriformis stretch    4. Quadraped ball supported hip extension    5. Supine table top position, for abd strengthening     Therapeutic Treatments and Modalities:     1. Manual Therapy (CPT 59955), lumbar and hips, assessment of tissue mobility, tone, and tenderness    Time-based treatments/modalities:  Manual therapy minutes (CPT 30102): 10 minutes  Therapeutic exercise minutes (CPT 79706): 20 minutes  Functional training, self care minutes (CPT 06528): 10 minutes           ASSESSMENT:   Response to treatment: patient presented with a different hip pain than time of evaluation. At time of eval pain was anterior hip and eval findings were consistent with hip flexor strain. Today patient did yoga over the weekend and had a posterior lumbar pain that referred down to glutes on both sides. Signs and symptoms of this pain were most consistent with a lumbar strain, possibly ligamentous.      PLAN/RECOMMENDATIONS:   Plan for treatment: therapy treatment to continue next visit.  Planned interventions for next visit: continue with current treatment.

## 2019-11-05 ENCOUNTER — PATIENT MESSAGE (OUTPATIENT)
Dept: MEDICAL GROUP | Facility: PHYSICIAN GROUP | Age: 49
End: 2019-11-05

## 2019-11-05 DIAGNOSIS — M54.6 ACUTE LEFT-SIDED THORACIC BACK PAIN: ICD-10-CM

## 2019-11-12 ENCOUNTER — PHYSICAL THERAPY (OUTPATIENT)
Dept: PHYSICAL THERAPY | Facility: REHABILITATION | Age: 49
End: 2019-11-12
Attending: FAMILY MEDICINE
Payer: COMMERCIAL

## 2019-11-12 DIAGNOSIS — S76.012D STRAIN OF FLEXOR MUSCLE OF LEFT HIP, SUBSEQUENT ENCOUNTER: ICD-10-CM

## 2019-11-12 DIAGNOSIS — M54.6 ACUTE LEFT-SIDED THORACIC BACK PAIN: ICD-10-CM

## 2019-11-12 DIAGNOSIS — M54.50 ACUTE BILATERAL LOW BACK PAIN WITHOUT SCIATICA: ICD-10-CM

## 2019-11-12 PROCEDURE — 97110 THERAPEUTIC EXERCISES: CPT

## 2019-11-12 PROCEDURE — 97112 NEUROMUSCULAR REEDUCATION: CPT

## 2019-11-13 NOTE — OP THERAPY DAILY TREATMENT
Outpatient Physical Therapy  DAILY TREATMENT     [facility] [address], [address]  [place] [address]  Phone:  [phone]    Date: 11/12/2019    Patient: José Bray  YOB: 1970  MRN: [id number]     Time Calculation  Start time: 0500  Stop time: 0530 Time Calculation (min): 30 minutes       Chief Complaint: No chief complaint on file.    Visit #: 3    SUBJECTIVE:  Having some discomfort into bilateral buttock. Working on HS stretching. Holding off on yoga.     OBJECTIVE:  Current objective measures:   ROS resisted hip flexion, 4/5   ROS palpation of proximal hip flexors  Ry stretch: ITB and rectus femoris decreased mm length            Therapeutic Exercises (CPT 22380):     1. Supine hs stretching with strap    2. Supine glute stretching with ball    3. Supine piriformis stretch    4. Quadraped ball supported hip extension    5. Supine table top position, for abd strengthening     Therapeutic Treatments and Modalities:     1. Manual Therapy (CPT 35002), lumbar and hips, assessment of tissue mobility, tone, and tenderness    Time-based treatments/modalities:  Therapeutic exercise minutes (CPT 12839): 20 minutes  Neuromusc re-ed, balance, coor, post minutes (CPT 43053): 10 minutes         ASSESSMENT:   Response to treatment: Patient shown stretches for lower lumbar spine for foraminal gapping. We worked primarily on lower abdominal strengthening     PLAN/RECOMMENDATIONS:   Plan for treatment: therapy treatment to continue next visit.  Planned interventions for next visit: continue with current treatment.

## 2019-11-18 ENCOUNTER — PHYSICAL THERAPY (OUTPATIENT)
Dept: PHYSICAL THERAPY | Facility: REHABILITATION | Age: 49
End: 2019-11-18
Attending: FAMILY MEDICINE
Payer: COMMERCIAL

## 2019-11-18 DIAGNOSIS — M54.50 ACUTE BILATERAL LOW BACK PAIN WITHOUT SCIATICA: ICD-10-CM

## 2019-11-18 DIAGNOSIS — S76.012D STRAIN OF FLEXOR MUSCLE OF LEFT HIP, SUBSEQUENT ENCOUNTER: ICD-10-CM

## 2019-11-18 PROCEDURE — 97014 ELECTRIC STIMULATION THERAPY: CPT

## 2019-11-18 PROCEDURE — 97110 THERAPEUTIC EXERCISES: CPT

## 2019-11-18 PROCEDURE — 97140 MANUAL THERAPY 1/> REGIONS: CPT

## 2019-11-18 NOTE — OP THERAPY DAILY TREATMENT
Outpatient Physical Therapy  DAILY TREATMENT     [facility] [place] [address], [address]  [place] [address]  Phone:  [phone]    Date: 11/18/2019    Patient: José Bray  YOB: 1970  MRN: [id number]     Time Calculation  Start time: 0730  Stop time: 0815 Time Calculation (min): 45 minutes       Chief Complaint: No chief complaint on file.    Visit #: 4    SUBJECTIVE:  Having more glut pain than front of the hip pain. Have not tried yoga in 2 weeks, will do tonight. Mostly pain in glutes, described as tightines    OBJECTIVE:  Current objective measures:   Quadrant test (-) for reproducing glut  [place]'s Test facet compression (-)  Active APT/PPT in standing good mobility and does not reproduce glut pain    L. Hip: hypertonic in abductors and external rotators           Exercises/Treatment  Time-based treatments/modalities:  Manual therapy minutes (CPT 36786): 10 minutes  Therapeutic exercise minutes (CPT 92309): 18 minutes     Supine on ball stretch  Doorway lat and lumbar stretch/traction  SB stretch over ball (pain reproduced in L/s)  Thoracolumbar rotation: green, 1 x 20 ea         ASSESSMENT:   Response to treatment: Chief complaint of bilateral glut pain. This could not be reproduced within session. He had an asymetry in hip muscle tone, more guarded on left abductors and external rotators. Educated patient in decompression stretching for lumbar spine.     PLAN/RECOMMENDATIONS:   Plan for treatment: therapy treatment to continue next visit.  Planned interventions for next visit: continue with current treatment.

## 2019-11-19 RX ORDER — CYCLOBENZAPRINE HCL 5 MG
5 TABLET ORAL 3 TIMES DAILY PRN
Qty: 21 TAB | Refills: 0 | Status: SHIPPED | OUTPATIENT
Start: 2019-11-19 | End: 2019-11-21 | Stop reason: SDUPTHER

## 2019-11-19 NOTE — TELEPHONE ENCOUNTER
Hello,    Sorry to hear that your back pain has not been able to get better.  Per my chart review I realize you have not tried any muscle relaxants.  Sometimes these can really help when you combine it with physical therapy.  I would like to do a trial of Flexeril for a total of 7 days to see if this relieves your pain.  Be aware that this medication is sedating so try 1 pill at a time and see how you respond.  I recommend that you continue with physical therapy for the full 6-week regimen.  If pain persists at that time, we can do imaging.    Thank you,    Moustapha Singh MD  Family Medicine Physician  [facility]  [phone]

## 2019-11-21 ENCOUNTER — OFFICE VISIT (OUTPATIENT)
Dept: URGENT CARE | Facility: PHYSICIAN GROUP | Age: 49
End: 2019-11-21
Payer: COMMERCIAL

## 2019-11-21 VITALS
HEIGHT: 74 IN | HEART RATE: 85 BPM | OXYGEN SATURATION: 96 % | WEIGHT: 211 LBS | RESPIRATION RATE: 18 BRPM | SYSTOLIC BLOOD PRESSURE: 118 MMHG | BODY MASS INDEX: 27.08 KG/M2 | DIASTOLIC BLOOD PRESSURE: 80 MMHG | TEMPERATURE: 98.1 F

## 2019-11-21 DIAGNOSIS — M54.6 ACUTE LEFT-SIDED THORACIC BACK PAIN: ICD-10-CM

## 2019-11-21 DIAGNOSIS — M54.42 ACUTE BILATERAL LOW BACK PAIN WITH LEFT-SIDED SCIATICA: ICD-10-CM

## 2019-11-21 PROCEDURE — 99214 OFFICE O/P EST MOD 30 MIN: CPT | Performed by: NURSE PRACTITIONER

## 2019-11-21 RX ORDER — CYCLOBENZAPRINE HCL 5 MG
5 TABLET ORAL 3 TIMES DAILY PRN
Qty: 21 TAB | Refills: 0 | Status: SHIPPED | OUTPATIENT
Start: 2019-11-21 | End: 2019-11-28

## 2019-11-21 RX ORDER — METHYLPREDNISOLONE 4 MG/1
TABLET ORAL
Qty: 1 PACKAGE | Refills: 0 | Status: SHIPPED | OUTPATIENT
Start: 2019-11-21 | End: 2019-12-09

## 2019-11-21 ASSESSMENT — ENCOUNTER SYMPTOMS
FOCAL WEAKNESS: 0
PERIANAL NUMBNESS: 0
TINGLING: 1
GASTROINTESTINAL NEGATIVE: 1
CONSTITUTIONAL NEGATIVE: 1
BACK PAIN: 1
BOWEL INCONTINENCE: 0

## 2019-11-21 NOTE — PROGRESS NOTES
Subjective:     José Bray is a 49 y.o. male who presents for Back Pain (L Lower, radiating down to leg x6wks)       Back Pain   This is a new problem. The problem has been gradually worsening since onset. Associated symptoms include tingling. Pertinent negatives include no bladder incontinence, bowel incontinence or perianal numbness.     Patient presents with bilateral lower back pain for 6 weeks.  Currently in physical therapy.  Reports that the pain radiates down to his left thigh.  Reports left thigh numbness.  He is able to move his toes and denies sensory changes at his toes distally.  Prior therapy: Physical therapy.  Unable to take NSAIDs regularly because of his history of blood thinners.  He reports that his PCP called in the prescription for muscle relaxant but he has not yet picked it up.    PMH:  has a past medical history of Diabetes ([facility]), Factor V deficiency (HCC), and Hyperlipidemia.    MEDS:   Current Outpatient Medications:   •  cyclobenzaprine (FLEXERIL) 5 MG tablet, Take 1 Tab by mouth 3 times a day as needed for Muscle Spasms for up to 7 days., Disp: 21 Tab, Rfl: 0  •  methylPREDNISolone (MEDROL DOSEPAK) 4 MG Tablet Therapy Pack, Use as directed on package., Disp: 1 Package, Rfl: 0  •  Multiple Vitamins-Minerals (MULTIVITAMIN ADULTS 50+ PO), , Disp: , Rfl:   •  rosuvastatin (CRESTOR) 5 MG Tab, Take 1 Tab by mouth every evening., Disp: 90 Tab, Rfl: 3  •  rivaroxaban (XARELTO) 20 MG Tab tablet, Take 1 Tab by mouth with dinner., Disp: 90 Tab, Rfl: 3  •  Empagliflozin-Metformin HCl ER 12.5-1000 MG TABLET SR 24 HR, Take 1 Tab by mouth 2 times a day., Disp: 60 Tab, Rfl: 11  •  Omega-3 Fatty Acids (FISH OIL) 1000 MG Cap capsule, Take 1,000 mg by mouth 3 times a day, with meals., Disp: , Rfl:   •  Blood Glucose Test Strips, One Touch Ultra Test Strips.  Test once daily and with symptoms of hypoglycemia.  Diagnosis: E11.9, Disp: 100 Strip, Rfl: 3  •  Blood Glucose Monitoring Suppl  "Device, One Touch Ultra glucometer.  Test once daily and with symptoms of hypoglycemia.  Diagnosis: E11.9, Disp: 1 Device, Rfl: 0  •  BENFOTIAMINE PO, Take  by mouth., Disp: , Rfl:   •  Cholecalciferol (VITAMIN D3) 2000 units Tab, Take  by mouth., Disp: , Rfl:     ALLERGIES:   Allergies   Allergen Reactions   • Yellow Dye Hives and Diarrhea     Hives, diarrhea      SURGHX:   Past Surgical History:   Procedure Laterality Date   • TONSILLECTOMY       SOCHX:  reports that he quit smoking about 18 years ago. He smoked 0.00 packs per day. He quit smokeless tobacco use about 8 years ago. He reports that he does not drink alcohol or use drugs.     FH: Reviewed with patient, not pertinent to this visit.    Review of Systems   Constitutional: Negative.    Gastrointestinal: Negative.  Negative for bowel incontinence.   Genitourinary: Negative.  Negative for bladder incontinence.   Musculoskeletal: Positive for back pain.   Neurological: Positive for tingling. Negative for focal weakness.   All other systems reviewed and are negative.    Objective:     /80 (BP Location: Left arm, Patient Position: Sitting, BP Cuff Size: Large adult)   Pulse 85   Temp 36.7 °C (98.1 °F) (Temporal)   Resp 18   Ht 1.88 m (6' 2\")   Wt 95.7 kg (211 lb)   SpO2 96%   BMI 27.09 kg/m²     Physical Exam  Vitals signs reviewed.   Constitutional:       General: He is not in acute distress.     Appearance: He is well-developed. He is not toxic-appearing or diaphoretic.   HENT:      Head: Normocephalic.      Right Ear: External ear normal.      Left Ear: External ear normal.      Nose: Nose normal.   Eyes:      Extraocular Movements: Extraocular movements intact.      Conjunctiva/sclera: Conjunctivae normal.   Neck:      Musculoskeletal: Normal range of motion.   Cardiovascular:      Rate and Rhythm: Normal rate.      Pulses: Normal pulses.   Pulmonary:      Effort: Pulmonary effort is normal. No respiratory distress.   Musculoskeletal: Normal " range of motion.         General: No deformity.      Left hip: Normal. He exhibits normal range of motion and normal strength.      Lumbar back: He exhibits tenderness (Midline and left paraspinous musculature; bilateral gluteal region), pain and spasm. He exhibits normal range of motion, no bony tenderness, no swelling, no deformity and no laceration.      Comments: Negative straight leg raise test bilaterally   Skin:     General: Skin is warm and dry.      Coloration: Skin is not pale.   Neurological:      General: No focal deficit present.      Mental Status: He is alert and oriented to person, place, and time.      Sensory: No sensory deficit.      Motor: Motor function is intact.      Coordination: Coordination is intact. Coordination normal.      Gait: Gait normal.   Psychiatric:         Behavior: Behavior normal. Behavior is cooperative.          Assessment/Plan:     1. Acute bilateral low back pain with left-sided sciatica  - methylPREDNISolone (MEDROL DOSEPAK) 4 MG Tablet Therapy Pack; Use as directed on package.  Dispense: 1 Package; Refill: 0  - REFERRAL TO SPINE SURGERY    Rx as above sent electronically. Advised to avoid NSAIDs while on steroid therapy.    Patient advised to initiate muscle relaxant called in by PCP.    Advised gentle massage, stretching, and range of motion exercises. May utilize cold/heat application. May use OTC acetaminophen as needed for additional pain relief.    Patient will continue with his physical therapy sessions.    Referral given for spine specialist.  Patient would prefer to see the one he has seen before for his neck.    Discussed close monitoring and supportive measures.    Vital signs stable, afebrile, asymptomatic, neurologically intact, no acute distress.    Warning signs reviewed. Strict return/ER precautions advised.    Patient advised to: Return for 1) Symptoms don't improve or worsen, or go to ER, 2) Follow up with primary care in 7-10 days.    Differential  diagnosis, natural history, supportive care, and indications for immediate follow-up discussed. All questions answered. Patient agrees with the plan of care.

## 2019-11-21 NOTE — TELEPHONE ENCOUNTER
Phone Number Called: [phone] (home) [phone] (work)      Call outcome: left message for patient to call back regarding message below    Message: Detailed vm. LM

## 2019-11-21 NOTE — TELEPHONE ENCOUNTER
Was the patient seen in the last year in this department? Yes    Does patient have an active prescription for medications requested? No     Received Request Via: Patient     Wants to go to [facility] on [address]. Patient said it was hard to put pants on today or get out of bed. Hard time walking and is in a lot of pain and can hardly move. L leg going numb since [place] message, numbing got worse. Goes all the way down to foot. Not swollen that hes aware of but wears compression socks. Stated hes had R DVT in the past. Made patient soonest apt 11/22/2019. Advised if he gets worse ER or UC. MARIA DEL CARMEN    [phone]  ok

## 2019-11-22 ENCOUNTER — OFFICE VISIT (OUTPATIENT)
Dept: MEDICAL GROUP | Facility: PHYSICIAN GROUP | Age: 49
End: 2019-11-22
Payer: COMMERCIAL

## 2019-11-22 VITALS
SYSTOLIC BLOOD PRESSURE: 110 MMHG | DIASTOLIC BLOOD PRESSURE: 62 MMHG | HEART RATE: 86 BPM | OXYGEN SATURATION: 94 % | TEMPERATURE: 97.8 F | HEIGHT: 74 IN | RESPIRATION RATE: 16 BRPM | BODY MASS INDEX: 26.75 KG/M2 | WEIGHT: 208.4 LBS

## 2019-11-22 DIAGNOSIS — M54.6 ACUTE LEFT-SIDED THORACIC BACK PAIN: ICD-10-CM

## 2019-11-22 DIAGNOSIS — M54.50 LUMBAR BACK PAIN: ICD-10-CM

## 2019-11-22 DIAGNOSIS — M54.30 SCIATIC LEG PAIN: ICD-10-CM

## 2019-11-22 PROCEDURE — 99213 OFFICE O/P EST LOW 20 MIN: CPT | Performed by: FAMILY MEDICINE

## 2019-11-23 NOTE — ASSESSMENT & PLAN NOTE
This is a chronic condition.  Onset: The patient endorses back pain onset 5 weeks ago  Location: left paraspinal, thoracic region, and lumbar and flank  Duration: Constant with shooting pain down his legs  Severity:  5/10  Quality: Sharp  Modifying factors: He had to go to urgent care 11/21/19.  He was given Medrol Dosepak and a referral to spine surgery for further evaluation.  He continues to go with physical therapy though he feels like is not helping enough.  I had sent a prescription for cyclobenzaprine 2 days ago which he has not picked up yet.  Precipitating trauma: Denies any  Associated symptoms: Denies any urinary incontinence, does feel left leg tingling with some numbness. Shooting pain down his left leg. Denies any LE weakness.   Home treatments: He is doing home exercises learned from physical therapy.  He is taking Medrol Dosepak. He does feel like it is helping.

## 2019-11-23 NOTE — PROGRESS NOTES
Subjective:     Chief Complaint   Patient presents with   • Back Pain     lower, hips 4+ weeks        HPI:   José presents today to discuss the following.    Back pain  This is a chronic condition.  Onset: The patient endorses back pain onset 5 weeks ago  Location: left paraspinal, thoracic region, and lumbar and flank  Duration: Constant with shooting pain down his legs  Severity:  5/10  Quality: Sharp  Modifying factors: He had to go to urgent care 19.  He was given Medrol Dosepak and a referral to spine surgery for further evaluation.  He continues to go with physical therapy though he feels like is not helping enough.  I had sent a prescription for cyclobenzaprine 2 days ago which he has not picked up yet.  Precipitating trauma: Denies any  Associated symptoms: Denies any urinary incontinence, does feel left leg tingling with some numbness. Shooting pain down his left leg. Denies any LE weakness.   Home treatments: He is doing home exercises learned from physical therapy.  He is taking Medrol Dosepak. He does feel like it is helping.           Past Medical History:   Diagnosis Date   • Diabetes (HCC)    • Factor V deficiency (HCC)    • Hyperlipidemia        Social History     Tobacco Use   • Smoking status: Former Smoker     Packs/day: 0.00     Last attempt to quit: 11/3/2001     Years since quittin.0   • Smokeless tobacco: Former User     Quit date: 3/1/2011   Substance Use Topics   • Alcohol use: Not Currently     Alcohol/week: 0.0 oz     Comment: drinks non-alcoholic beer   • Drug use: Never       Current Outpatient Medications Ordered in Epic   Medication Sig Dispense Refill   • cyclobenzaprine (FLEXERIL) 5 MG tablet Take 1 Tab by mouth 3 times a day as needed for Muscle Spasms for up to 7 days. 21 Tab 0   • methylPREDNISolone (MEDROL DOSEPAK) 4 MG Tablet Therapy Pack Use as directed on package. 1 Package 0   • Multiple Vitamins-Minerals (MULTIVITAMIN ADULTS 50+ PO)      • rosuvastatin  "(CRESTOR) 5 MG Tab Take 1 Tab by mouth every evening. 90 Tab 3   • rivaroxaban (XARELTO) 20 MG Tab tablet Take 1 Tab by mouth with dinner. 90 Tab 3   • Empagliflozin-Metformin HCl ER 12.5-1000 MG TABLET SR 24 HR Take 1 Tab by mouth 2 times a day. 60 Tab 11   • Omega-3 Fatty Acids (FISH OIL) 1000 MG Cap capsule Take 1,000 mg by mouth 3 times a day, with meals.     • Blood Glucose Test Strips One Touch Ultra Test Strips.  Test once daily and with symptoms of hypoglycemia.  Diagnosis: E11.9 100 Strip 3   • Blood Glucose Monitoring Suppl Device One Touch Ultra glucometer.  Test once daily and with symptoms of hypoglycemia.  Diagnosis: E11.9 1 Device 0   • BENFOTIAMINE PO Take  by mouth.     • Cholecalciferol (VITAMIN D3) 2000 units Tab Take  by mouth.       No current Epic-ordered facility-administered medications on file.        Allergies:  Yellow dye    Health Maintenance: Completed    ROS:  Gen: no fevers/chills  Eyes: no changes in vision  ENT: no sore throat, no hearing loss, no bloody nose  Pulm: no sob, no cough  CV: no chest pain, no palpitations  GI: no nausea/vomiting, no diarrhea  : no dysuria  Neuro: +left leg tingling      Objective:       Exam:  /62 (BP Location: Left arm, Patient Position: Sitting, BP Cuff Size: Adult)   Pulse 86   Temp 36.6 °C (97.8 °F) (Temporal)   Resp 16   Ht 1.88 m (6' 2\")   Wt 94.5 kg (208 lb 6.4 oz)   SpO2 94%   BMI 26.76 kg/m²  Body mass index is 26.76 kg/m².    Gen: Alert and oriented, No apparent distress.  HENT: TMs are clear. Oropharynx is w/o erythema or exudates. Neck is supple without cervical lymphadenopathy. No JVD.   Eyes: PERRLA  Lungs: Normal effort, CTA bilaterally, no wheezes, rhonchi, or rales  CV: Regular rate and rhythm. No murmurs, rubs, or gallops.  Abdomen: Soft, nontender, nondistended. Normal bowel sounds. Liver and spleen are not palpable  Ext: No clubbing, cyanosis, edema.  Palpation of his left piriformis muscle elicits pain.    Skin: no " rash, lesions or ulcers  Neuro: Moves all extremities.  Sensorium down his lower extremities is normal.  Psych: AAOx3      Assessment & Plan:     49 y.o. male with the following -     1. Acute left-sided thoracic back pain  2. Lumbar back pain  3. Sciatic leg pain  This is a chronic problem, worsening.  The patient presents today for follow-up after having persistent pain.  Specifically, he has been experiencing generalized thoracolumbar/flank pain with shooting pain down his left leg.  His presentation is suggestive of piriformis syndrome.  He has been going to physical therapy over the past 4-1/2 weeks but it looks like the main focus has been on his hip flexors.  I would like to send in a new referral for physical therapy to also work on stretching exercises for sciatica and lower back pain.  Of note, he went to urgent care on 11/21/2019 given the fact that his pain has been worsening.  No imaging was done there.  He was ultimately discharged with a Medrol Dosepak which is helping.  I had sent a prescription for cyclobenzaprine for a total of 7 days.  It looks like cyclobenzaprine has yellow dye which she is allergic to.  As such, his pharmacy special ordered dye free cyclobenzaprine for him.  He will pick that up today.  A referral has been placed to spine specialist by urgent care.  I recommend that he follows up with him to.  The patient is agreeable to plan.  - REFERRAL TO PHYSICAL THERAPY Reason for Therapy: Eval/Treat/Report      Return for FU w/ PCP.    Please note that this dictation was created using voice recognition software. I have made every reasonable attempt to correct obvious errors, but I expect that there are errors of grammar and possibly content that I did not discover before finalizing the note.

## 2019-11-25 ENCOUNTER — PHYSICAL THERAPY (OUTPATIENT)
Dept: PHYSICAL THERAPY | Facility: REHABILITATION | Age: 49
End: 2019-11-25
Attending: FAMILY MEDICINE
Payer: COMMERCIAL

## 2019-11-25 DIAGNOSIS — M54.50 ACUTE BILATERAL LOW BACK PAIN WITHOUT SCIATICA: ICD-10-CM

## 2019-11-25 DIAGNOSIS — G57.02 PIRIFORMIS SYNDROME OF LEFT SIDE: ICD-10-CM

## 2019-11-25 PROCEDURE — 97012 MECHANICAL TRACTION THERAPY: CPT

## 2019-11-25 PROCEDURE — 97140 MANUAL THERAPY 1/> REGIONS: CPT

## 2019-11-25 PROCEDURE — 97110 THERAPEUTIC EXERCISES: CPT

## 2019-11-25 NOTE — OP THERAPY DAILY TREATMENT
Outpatient Physical Therapy  DAILY TREATMENT     [facility] [address], [address]  [place] [address]  Phone:  [phone]    Date: 11/25/2019    Patient: José Bray  YOB: 1970  MRN: [id number]     Time Calculation  Start time: 0730  Stop time: 0815 Time Calculation (min): 45 minutes       Chief Complaint: No chief complaint on file.    Visit #: 5    SUBJECTIVE:  Went to urgent care for LBP w/ L. Sciatica pain. Went to yoga, and the next AM symptoms developed.     OBJECTIVE:  Current objective measures:     Lumbar AROM: WNL  Palpation: normal paraspinal muscle tone that is not tender  L. Decline in piriformis flexibility and increase in tone  (+) sciatic tension on L. That does not reproduce radicular symptoms with stretch           Therapeutic Exercises (CPT 53388):     1. Lumbar AROM in supine: pelvic tilt and knee sway    2. Sciatic floss     3. Sidelying clams     Therapeutic Treatments and Modalities:     1. Manual Therapy (CPT 80466), lumbar and hip, assessment of soft tissue tone, tenderness and hip and lumbar mobility     2. Mechanical Traction (CPT 72565), lumbar, 90/50# and 60/20t 15 min duration     Time-based treatments/modalities:  Manual therapy minutes (CPT 62165): 10 minutes  Therapeutic exercise minutes (CPT 23267): 20 minutes         ASSESSMENT:   Response to treatment: Patient has been attending PT with complaint of pain or tension across multiple regions of body. Initially anterior hip pain and hip flexor weakness were chief complaints. More recently symptoms have been in lower lumbar spine and he is now reporting radicular symptoms down L. Leg. Signs and symptoms were suggestive of piriformis syndrome. Treatment was focussed today at improving lumbar AROM, hip mobility, sciatic flossing preferred over HS stretching, and decompression with lumbar traction.     PLAN/RECOMMENDATIONS:   Plan for treatment: therapy treatment to continue  next visit.  Planned interventions for next visit: continue with current treatment.

## 2019-12-07 ENCOUNTER — HOSPITAL ENCOUNTER (OUTPATIENT)
Dept: LAB | Facility: MEDICAL CENTER | Age: 49
End: 2019-12-07
Attending: NURSE PRACTITIONER
Payer: COMMERCIAL

## 2019-12-07 LAB
CHOLEST SERPL-MCNC: 150 MG/DL (ref 100–199)
FASTING STATUS PATIENT QL REPORTED: NORMAL
HDLC SERPL-MCNC: 39 MG/DL
LDLC SERPL CALC-MCNC: 83 MG/DL
TRIGL SERPL-MCNC: 139 MG/DL (ref 0–149)

## 2019-12-07 PROCEDURE — 36415 COLL VENOUS BLD VENIPUNCTURE: CPT

## 2019-12-07 PROCEDURE — 80061 LIPID PANEL: CPT

## 2019-12-09 ENCOUNTER — OFFICE VISIT (OUTPATIENT)
Dept: CARDIOLOGY | Facility: MEDICAL CENTER | Age: 49
End: 2019-12-09
Payer: COMMERCIAL

## 2019-12-09 VITALS
OXYGEN SATURATION: 94 % | DIASTOLIC BLOOD PRESSURE: 76 MMHG | BODY MASS INDEX: 26.44 KG/M2 | SYSTOLIC BLOOD PRESSURE: 116 MMHG | HEART RATE: 72 BPM | WEIGHT: 206 LBS | HEIGHT: 74 IN

## 2019-12-09 DIAGNOSIS — I25.10 CORONARY ARTERY CALCIFICATION: ICD-10-CM

## 2019-12-09 DIAGNOSIS — I25.84 CORONARY ARTERY CALCIFICATION: ICD-10-CM

## 2019-12-09 DIAGNOSIS — E78.2 MIXED HYPERLIPIDEMIA: ICD-10-CM

## 2019-12-09 DIAGNOSIS — Z86.718 HISTORY OF DVT (DEEP VEIN THROMBOSIS): ICD-10-CM

## 2019-12-09 PROCEDURE — 99214 OFFICE O/P EST MOD 30 MIN: CPT | Performed by: NURSE PRACTITIONER

## 2019-12-09 RX ORDER — ROSUVASTATIN CALCIUM 10 MG/1
10 TABLET, COATED ORAL EVERY EVENING
Qty: 30 TAB | Refills: 11 | Status: SHIPPED | OUTPATIENT
Start: 2019-12-09 | End: 2021-01-11 | Stop reason: SDUPTHER

## 2019-12-09 ASSESSMENT — ENCOUNTER SYMPTOMS
SHORTNESS OF BREATH: 0
WEAKNESS: 0
ABDOMINAL PAIN: 0
COUGH: 0
ORTHOPNEA: 0
PALPITATIONS: 0
DIZZINESS: 0
CLAUDICATION: 0
PND: 0
MYALGIAS: 0

## 2019-12-09 NOTE — PROGRESS NOTES
"Chief Complaint   Patient presents with   • Follow-Up     Fast heart beat       Subjective:   José Bray is a 49 y.o. male who presents today to follow-up on hyperlipidemia and previous elevated coronary calcium score.  He has family history of heart disease.  He was last seen by myself in June at which time he was complaining of a fast heart rate when he was exercising strenuously.  He no longer has this as he has backed off on his exercise. He is here for results of his lipid panel.    He complains of pain in his left hip and sciatica with pain down his left thigh.  He is seeing physical therapy for this.  He denies any chest tightness, heaviness or pressure.  No palpitations.    He is exercising by walking his dog but has not been doing any regular cardio activity.  He did ride his bike around [place] which is 72 miles.  He did this \"off the couch\"    Past Medical History:   Diagnosis Date   • Diabetes (HCC)    • Factor V deficiency (HCC)    • Hyperlipidemia      Past Surgical History:   Procedure Laterality Date   • TONSILLECTOMY       Family History   Problem Relation Age of Onset   • Cancer Mother         melanoma   • Heart Disease Father 61        MI, stent   • Diabetes Maternal Grandmother    • Hyperlipidemia Paternal Grandmother    • Heart Disease Paternal Grandfather         valve problems   • Alcohol/Drug Neg Hx      Social History     Socioeconomic History   • Marital status:      Spouse name: Not on file   • Number of children: Not on file   • Years of education: Not on file   • Highest education level: Not on file   Occupational History   • Not on file   Social Needs   • Financial resource strain: Not on file   • Food insecurity:     Worry: Not on file     Inability: Not on file   • Transportation needs:     Medical: Not on file     Non-medical: Not on file   Tobacco Use   • Smoking status: Former Smoker     Packs/day: 0.00     Last attempt to quit: 11/3/2001     Years since " quittin.1   • Smokeless tobacco: Former User     Quit date: 3/1/2011   Substance and Sexual Activity   • Alcohol use: Not Currently     Alcohol/week: 0.0 oz     Comment: drinks non-alcoholic beer   • Drug use: Never   • Sexual activity: Yes     Partners: Female   Lifestyle   • Physical activity:     Days per week: Not on file     Minutes per session: Not on file   • Stress: Not on file   Relationships   • Social connections:     Talks on phone: Not on file     Gets together: Not on file     Attends Yazdanism service: Not on file     Active member of club or organization: Not on file     Attends meetings of clubs or organizations: Not on file     Relationship status: Not on file   • Intimate partner violence:     Fear of current or ex partner: Not on file     Emotionally abused: Not on file     Physically abused: Not on file     Forced sexual activity: Not on file   Other Topics Concern   • Not on file   Social History Narrative   • Not on file     Allergies   Allergen Reactions   • Yellow Dye Hives and Diarrhea     Hives, diarrhea      Outpatient Encounter Medications as of 2019   Medication Sig Dispense Refill   • Multiple Vitamins-Minerals (MULTIVITAMIN ADULTS 50+ PO)      • rivaroxaban (XARELTO) 20 MG Tab tablet Take 1 Tab by mouth with dinner. 90 Tab 3   • Empagliflozin-Metformin HCl ER 12.5-1000 MG TABLET SR 24 HR Take 1 Tab by mouth 2 times a day. 60 Tab 11   • Omega-3 Fatty Acids (FISH OIL) 1000 MG Cap capsule Take 1,000 mg by mouth 3 times a day, with meals.     • Blood Glucose Test Strips One Touch Ultra Test Strips.  Test once daily and with symptoms of hypoglycemia.  Diagnosis: E11.9 100 Strip 3   • Blood Glucose Monitoring Suppl Device One Touch Ultra glucometer.  Test once daily and with symptoms of hypoglycemia.  Diagnosis: E11.9 1 Device 0   • BENFOTIAMINE PO Take  by mouth.     • Cholecalciferol (VITAMIN D3) 2000 units Tab Take  by mouth.     • [DISCONTINUED] methylPREDNISolone (MEDROL  "DOSEPAK) 4 MG Tablet Therapy Pack Use as directed on package. 1 Package 0   • [DISCONTINUED] rosuvastatin (CRESTOR) 5 MG Tab Take 1 Tab by mouth every evening. 90 Tab 3     No facility-administered encounter medications on file as of 12/9/2019.      Review of Systems   Constitutional: Negative for malaise/fatigue.   Respiratory: Negative for cough and shortness of breath.    Cardiovascular: Negative for chest pain, palpitations, orthopnea, claudication, leg swelling and PND.   Gastrointestinal: Negative for abdominal pain.   Musculoskeletal: Positive for joint pain (left hip pain with sciatica). Negative for myalgias.   Neurological: Negative for dizziness and weakness.        Objective:   /76 (BP Location: Left arm, Patient Position: Sitting, BP Cuff Size: Adult)   Pulse 72   Ht 1.88 m (6' 2\")   Wt 93.4 kg (206 lb)   SpO2 94%   BMI 26.45 kg/m²     Physical Exam   Constitutional: He is oriented to person, place, and time. He appears well-developed and well-nourished.   Tall male in no acute distress.   HENT:   Head: Normocephalic.   Eyes: EOM are normal.   Neck: Normal range of motion. No JVD present.   Cardiovascular: Regular rhythm. Bradycardia present. Exam reveals no friction rub.   No murmur heard.  Probable athletic heart.   Pulmonary/Chest: Effort normal.   Abdominal: Soft. Bowel sounds are normal.   Musculoskeletal:         General: No edema.   Neurological: He is alert and oriented to person, place, and time.   Skin: Skin is warm and dry.   Psychiatric: He has a normal mood and affect.     Results for DEONGAETANO VIMAL ALVAREZ (MRN [id number])    Ref. Range 12/7/2019 07:19   Cholesterol,Tot Latest Ref Range: 100 - 199 mg/dL 150   Triglycerides Latest Ref Range: 0 - 149 mg/dL 139   HDL Latest Ref Range: >=40 mg/dL 39 (A)   LDL Latest Ref Range: <100 mg/dL 83     Assessment:     1. Mixed hyperlipidemia     2. Coronary artery calcification     3. History of DVT (deep vein thrombosis)         Medical " Decision Making:  Today's Assessment / Status / Plan:   Hyperlipidemia: He does have a positive coronary calcium scan therefore his LDL goal is 70 or less.  I will increase Crestor to 10 mg daily and check lipids in 3 months.  I would like his primary care, Dr. Carolina Adkins to order the lipids so that she can follow them.    Coronary artery calcification: His number was 99 therefore he is considered to have coronary artery disease although he is asymptomatic.  He is encouraged to do regular moderate cardio activity.  If he develops any exertional chest tightness, heaviness or pressure he will let us know.    History of DVT: Patient has factor V Leiden and will be on Xarelto lifelong.  He is encouraged to be careful with his sports activities as an injury could cause him significant bleeding.    He can be followed by his primary care, Dr. Carolina Adkins who can follow his lipids.  He may return to our office at any time particularly if he has exertional chest discomfort.    Collaborating Provider: Dr. Albertina Estes.    Please note that this dictation was created using voice recognition software. I have made every reasonable attempt to correct obvious errors, but it is possible there are errors of grammar and possibly content that I did not discover before finalizing the note.

## 2019-12-10 ENCOUNTER — APPOINTMENT (OUTPATIENT)
Dept: PHYSICAL THERAPY | Facility: REHABILITATION | Age: 49
End: 2019-12-10
Attending: FAMILY MEDICINE
Payer: COMMERCIAL

## 2020-01-09 ENCOUNTER — OFFICE VISIT (OUTPATIENT)
Dept: MEDICAL GROUP | Facility: PHYSICIAN GROUP | Age: 50
End: 2020-01-09
Payer: COMMERCIAL

## 2020-01-09 VITALS
HEART RATE: 78 BPM | TEMPERATURE: 98.9 F | WEIGHT: 210 LBS | BODY MASS INDEX: 26.95 KG/M2 | RESPIRATION RATE: 16 BRPM | DIASTOLIC BLOOD PRESSURE: 80 MMHG | OXYGEN SATURATION: 96 % | HEIGHT: 74 IN | SYSTOLIC BLOOD PRESSURE: 110 MMHG

## 2020-01-09 DIAGNOSIS — E78.2 MIXED HYPERLIPIDEMIA: ICD-10-CM

## 2020-01-09 DIAGNOSIS — M54.6 ACUTE LEFT-SIDED THORACIC BACK PAIN: Primary | ICD-10-CM

## 2020-01-09 DIAGNOSIS — E11.9 TYPE 2 DIABETES MELLITUS WITHOUT COMPLICATION, WITHOUT LONG-TERM CURRENT USE OF INSULIN (HCC): ICD-10-CM

## 2020-01-09 PROCEDURE — 99214 OFFICE O/P EST MOD 30 MIN: CPT | Performed by: FAMILY MEDICINE

## 2020-01-10 NOTE — PROGRESS NOTES
Subjective:     CC: f/u back pain    HPI:   José presents today with     Back pain  This is a chronic condition.  He was getting left-sided lower back pain with shooting pain down his legs.  He had been going to physical therapy without much relief and a provider in the office and physical therapy with more specific directions for sciatica. He didn't go back to PT for those exercises.  He was also seen in urgent care on 11/10 1/19 and given a Medrol Dosepak.  He was also given Flexeril.  He was also sent to [facility] by urgent care.  Today reports no change in his back pain. Neurosurgery has sent him to [facility] for 10 more sessions. He is doing the home exercises which is managing the pain, but overall there is still a lot of tightness in his left leg.     Mixed hyperlipidemia  This is a chronic condition.  He has been managing this with cardiology.  He reports that he just has cardiologist a month ago and they told him that primary can take over the management of his hyperlipidemia.  He does have labs already ordered to get checked in .    Type 2 diabetes mellitus without complication  This is a chronic condition.  He has been managing with endocrinology but today wonders if he needs to see endocrinology if I can take over his care.      Past Medical History:   Diagnosis Date   • Diabetes (HCC)    • Factor V deficiency (HCC)    • Hyperlipidemia        Social History     Tobacco Use   • Smoking status: Former Smoker     Packs/day: 0.00     Last attempt to quit: 11/3/2001     Years since quittin.1   • Smokeless tobacco: Former User     Quit date: 3/1/2011   Substance Use Topics   • Alcohol use: Not Currently     Alcohol/week: 0.0 oz     Comment: drinks non-alcoholic beer   • Drug use: Never       Current Outpatient Medications Ordered in Epic   Medication Sig Dispense Refill   • rosuvastatin (CRESTOR) 10 MG Tab Take 1 Tab by mouth every evening. 30 Tab 11   • Multiple  "Vitamins-Minerals (MULTIVITAMIN ADULTS 50+ PO)      • rivaroxaban (XARELTO) 20 MG Tab tablet Take 1 Tab by mouth with dinner. 90 Tab 3   • Empagliflozin-Metformin HCl ER 12.5-1000 MG TABLET SR 24 HR Take 1 Tab by mouth 2 times a day. 60 Tab 11   • Omega-3 Fatty Acids (FISH OIL) 1000 MG Cap capsule Take 1,000 mg by mouth 3 times a day, with meals.     • Blood Glucose Test Strips One Touch Ultra Test Strips.  Test once daily and with symptoms of hypoglycemia.  Diagnosis: E11.9 100 Strip 3   • Blood Glucose Monitoring Suppl Device One Touch Ultra glucometer.  Test once daily and with symptoms of hypoglycemia.  Diagnosis: E11.9 1 Device 0   • BENFOTIAMINE PO Take  by mouth.     • Cholecalciferol (VITAMIN D3) 2000 units Tab Take  by mouth.       No current Epic-ordered facility-administered medications on file.        Allergies:  Yellow dye    Health Maintenance: Completed    ROS:  Gen: no fevers/chills  Pulm: no sob  CV: no chest pain    Objective:     Exam:  /80   Pulse 78   Temp 37.2 °C (98.9 °F)   Resp 16   Ht 1.88 m (6' 2\")   Wt 95.3 kg (210 lb)   SpO2 96%   BMI 26.96 kg/m²  Body mass index is 26.96 kg/m².    Gen: Alert and oriented, No apparent distress.  Neck: Neck is supple without lymphadenopathy.  Lungs: Normal effort, CTA bilaterally, no wheezes, rhonchi, or rales  CV: Regular rate and rhythm. No murmurs, rubs, or gallops.  Ext: No clubbing, cyanosis, edema.    Assessment & Plan:     49 y.o. male with the following -     1. Acute left-sided thoracic back pain  This is an acute condition.  He has been having pain since late October, 2019.  He went to physical therapy that mostly focused on his hips but continued to have pain.  He was seen in urgent care on 11/21/2019 and given methylprednisolone and a referral to [facility].  He then saw a provider in this office the next day who sent in a new PT prescription to focus more on the back.  He states he has not done that PT prescription yet " and in the meantime has seen the neurosurgery group that sent him to [facility] for 10 more sessions.  The plan is for him to go back if continued pain for those sessions.  He states that he has that scheduled starting for next week.  He also mentions that he gets some stabbing pain in his perineum which is new to me.  He did not mention this with neurosurgery but I wonder if it is related to what ever is causing his pain in his back and radiating into his left leg.  I strongly encouraged him to follow-up with neurosurgery after the physical therapy and discuss the shooting pain with him further.    2. Mixed hyperlipidemia  This is a chronic condition, stable.  He has a positive coronary calcium scan so he has a goal of LDL less than 70.  He is currently on a statin and has been managed by cardiology.  However, cardiology states primary care can take over the management of his cholesterol and he has a lipid panel scheduled to be done in March, 2020.    3. Type 2 diabetes mellitus without complication, without long-term current use of insulin (HCC)  This is a chronic condition, stable.  He did see endocrinology but today wonders if he still needs to see a specialist for this.  It was well controlled on 2 medications so I told him I can take over the care of his diabetes.  He will get his hemoglobin A1c done before his next appointment.  - HEMOGLOBIN A1C; Future    Return in about 3 months (around 4/9/2020) for f/u DM, lipids.    Please note that this dictation was created using voice recognition software. I have made every reasonable attempt to correct obvious errors, but I expect that there are errors of grammar and possibly content that I did not discover before finalizing the note.

## 2020-01-10 NOTE — ASSESSMENT & PLAN NOTE
This is a chronic condition.  He has been managing this with cardiology.  He reports that he just has cardiologist a month ago and they told him that primary can take over the management of his hyperlipidemia.  He does have labs already ordered to get checked in March, 2020.

## 2020-01-10 NOTE — ASSESSMENT & PLAN NOTE
This is a chronic condition.  He has been managing with endocrinology but today wonders if he needs to see endocrinology if I can take over his care.

## 2020-01-10 NOTE — ASSESSMENT & PLAN NOTE
This is a chronic condition.  He was getting left-sided lower back pain with shooting pain down his legs.  He had been going to physical therapy without much relief and a provider in the office and physical therapy with more specific directions for sciatica. He didn't go back to PT for those exercises.  He was also seen in urgent care on 11/10 1/19 and given a Medrol Dosepak.  He was also given Flexeril.  He was also sent to [facility] by urgent care.  Today reports no change in his back pain. Neurosurgery has sent him to [facility] for 10 more sessions. He is doing the home exercises which is managing the pain, but overall there is still a lot of tightness in his left leg.

## 2020-01-30 ENCOUNTER — HOSPITAL ENCOUNTER (OUTPATIENT)
Dept: LAB | Facility: MEDICAL CENTER | Age: 50
End: 2020-01-30
Attending: PHYSICIAN ASSISTANT
Payer: COMMERCIAL

## 2020-01-30 LAB
ANION GAP SERPL CALC-SCNC: 7 MMOL/L (ref 0–11.9)
BUN SERPL-MCNC: 17 MG/DL (ref 8–22)
CALCIUM SERPL-MCNC: 9.4 MG/DL (ref 8.5–10.5)
CHLORIDE SERPL-SCNC: 103 MMOL/L (ref 96–112)
CO2 SERPL-SCNC: 27 MMOL/L (ref 20–33)
CREAT SERPL-MCNC: 1.04 MG/DL (ref 0.5–1.4)
GLUCOSE SERPL-MCNC: 185 MG/DL (ref 65–99)
POTASSIUM SERPL-SCNC: 4 MMOL/L (ref 3.6–5.5)
SODIUM SERPL-SCNC: 137 MMOL/L (ref 135–145)

## 2020-01-30 PROCEDURE — 36415 COLL VENOUS BLD VENIPUNCTURE: CPT

## 2020-01-30 PROCEDURE — 80048 BASIC METABOLIC PNL TOTAL CA: CPT

## 2020-01-31 ENCOUNTER — TELEPHONE (OUTPATIENT)
Dept: CARDIOLOGY | Facility: MEDICAL CENTER | Age: 50
End: 2020-01-31

## 2020-01-31 NOTE — TELEPHONE ENCOUNTER
Received fax from [facility] (F: [phone]) requesting:    - Cardiac clearance for upcoming Colonoscopy with Moderate Sedation on 2/7/2020.  - Xarelto hold 1 day prior to procedure.     Clearance request relayed to ADD-MD to review and advise.

## 2020-01-31 NOTE — TELEPHONE ENCOUNTER
"Patient Call   Morgan Frazier M.D.  You 2 hours ago (12:22 PM)      Patient is taking the Xarelto for factor V Leiden deficiency and history of DVT.  It does not appear that we have been treating the patient for this condition.  It may be more appropriate to direct this request towards the patient's primary care provider or alternatively a hematologist if the patient has already established with 1.    Routing comment      Telephone encounter printed and faxed to Jack, [phone], completed status.    Called Jack, [phone], unable to reach.  Left detailed voicemail regarding ADD-MD recommendations.    Called pt and reviewed findings.  He verbalizes understanding and states, \"they were probably concerned if they had to cut a polyp.\"  He states no other concerns or questions at this time and is appreciative of information given.    Fax sent to scanning for reference.  "

## 2020-02-18 ENCOUNTER — HOSPITAL ENCOUNTER (OUTPATIENT)
Dept: RADIOLOGY | Facility: MEDICAL CENTER | Age: 50
End: 2020-02-18
Attending: NURSE PRACTITIONER
Payer: COMMERCIAL

## 2020-02-18 DIAGNOSIS — M48.061 SPINAL STENOSIS OF LUMBAR REGION WITHOUT NEUROGENIC CLAUDICATION: ICD-10-CM

## 2020-02-18 DIAGNOSIS — M48.061 SPINAL STENOSIS, LUMBAR REGION, WITHOUT NEUROGENIC CLAUDICATION: ICD-10-CM

## 2020-02-18 PROCEDURE — 72148 MRI LUMBAR SPINE W/O DYE: CPT

## 2020-02-18 PROCEDURE — 72110 X-RAY EXAM L-2 SPINE 4/>VWS: CPT

## 2020-03-03 ENCOUNTER — HOSPITAL ENCOUNTER (OUTPATIENT)
Dept: RADIOLOGY | Facility: MEDICAL CENTER | Age: 50
End: 2020-03-03
Attending: NURSE PRACTITIONER
Payer: COMMERCIAL

## 2020-03-03 DIAGNOSIS — M53.3 DISORDER OF SACRUM: ICD-10-CM

## 2020-03-03 DIAGNOSIS — M53.3 SACROILIAC DYSFUNCTION: ICD-10-CM

## 2020-03-03 PROCEDURE — 72195 MRI PELVIS W/O DYE: CPT

## 2020-03-03 PROCEDURE — 72220 X-RAY EXAM SACRUM TAILBONE: CPT

## 2020-04-04 ENCOUNTER — HOSPITAL ENCOUNTER (OUTPATIENT)
Dept: LAB | Facility: MEDICAL CENTER | Age: 50
End: 2020-04-04
Attending: FAMILY MEDICINE
Payer: COMMERCIAL

## 2020-04-04 ENCOUNTER — HOSPITAL ENCOUNTER (OUTPATIENT)
Dept: LAB | Facility: MEDICAL CENTER | Age: 50
End: 2020-04-04
Attending: NURSE PRACTITIONER
Payer: COMMERCIAL

## 2020-04-04 DIAGNOSIS — E11.9 TYPE 2 DIABETES MELLITUS WITHOUT COMPLICATION, WITHOUT LONG-TERM CURRENT USE OF INSULIN (HCC): ICD-10-CM

## 2020-04-04 LAB
CHOLEST SERPL-MCNC: 161 MG/DL (ref 100–199)
EST. AVERAGE GLUCOSE BLD GHB EST-MCNC: 192 MG/DL
HBA1C MFR BLD: 8.3 % (ref 0–5.6)
HDLC SERPL-MCNC: 40 MG/DL
LDLC SERPL CALC-MCNC: 93 MG/DL
TRIGL SERPL-MCNC: 139 MG/DL (ref 0–149)

## 2020-04-04 PROCEDURE — 36415 COLL VENOUS BLD VENIPUNCTURE: CPT

## 2020-04-04 PROCEDURE — 80061 LIPID PANEL: CPT

## 2020-04-04 PROCEDURE — 83036 HEMOGLOBIN GLYCOSYLATED A1C: CPT

## 2020-04-09 ENCOUNTER — OFFICE VISIT (OUTPATIENT)
Dept: MEDICAL GROUP | Facility: PHYSICIAN GROUP | Age: 50
End: 2020-04-09
Payer: COMMERCIAL

## 2020-04-09 VITALS
BODY MASS INDEX: 26.69 KG/M2 | OXYGEN SATURATION: 93 % | TEMPERATURE: 98.2 F | HEIGHT: 74 IN | SYSTOLIC BLOOD PRESSURE: 98 MMHG | WEIGHT: 208 LBS | HEART RATE: 85 BPM | DIASTOLIC BLOOD PRESSURE: 60 MMHG

## 2020-04-09 DIAGNOSIS — E11.9 TYPE 2 DIABETES MELLITUS WITHOUT COMPLICATION, WITHOUT LONG-TERM CURRENT USE OF INSULIN (HCC): ICD-10-CM

## 2020-04-09 DIAGNOSIS — J02.9 SORE THROAT: Primary | ICD-10-CM

## 2020-04-09 PROCEDURE — 99213 OFFICE O/P EST LOW 20 MIN: CPT | Performed by: FAMILY MEDICINE

## 2020-04-09 ASSESSMENT — FIBROSIS 4 INDEX: FIB4 SCORE: 1.06

## 2020-04-09 ASSESSMENT — PATIENT HEALTH QUESTIONNAIRE - PHQ9: CLINICAL INTERPRETATION OF PHQ2 SCORE: 0

## 2020-04-09 NOTE — ASSESSMENT & PLAN NOTE
This is a new condition. He has had mild sore throat for the last few weeks. No rhinorrhea, cough, no fevers/chills, no sob. +CP but unchanged and chronic. At work there was one person who had been exposed some of his COVID-19 positive.  He did self quarantine for 14 days and was eventually found to be COVID-19 negative.  There was one other coworker who was positive who snuck in the back door of a building but he states it was in a building that he never sets foot in.

## 2020-04-09 NOTE — ASSESSMENT & PLAN NOTE
This is a chronic condition. He does admit to a bad diet and less exercising than usual.  Current medications: empagliflozin-metformin ER 12.5-1000 mg bid (max dose)  Last A1c: 8.3% (4/2020); 6.7% (3/2019)  Last Microalb/Cr ratio: normal (8/2019)  Last diabetic foot exam: 7/2019  Last retinal eye exam: 4/2019  ACEi/ARB: no - no microalbuminuria  Statin: rosuvastatin 10 mg  Aspirin: no  Concomitant HTN: no - at goal  Nightly foot checks: yes

## 2020-06-12 DIAGNOSIS — D68.51 FACTOR V LEIDEN (HCC): ICD-10-CM

## 2020-07-28 ENCOUNTER — HOSPITAL ENCOUNTER (OUTPATIENT)
Facility: MEDICAL CENTER | Age: 50
End: 2020-07-28
Attending: FAMILY MEDICINE
Payer: COMMERCIAL

## 2020-07-28 ENCOUNTER — TELEPHONE (OUTPATIENT)
Dept: MEDICAL GROUP | Facility: PHYSICIAN GROUP | Age: 50
End: 2020-07-28

## 2020-07-28 ENCOUNTER — OFFICE VISIT (OUTPATIENT)
Dept: MEDICAL GROUP | Facility: PHYSICIAN GROUP | Age: 50
End: 2020-07-28
Payer: COMMERCIAL

## 2020-07-28 VITALS
BODY MASS INDEX: 26.77 KG/M2 | TEMPERATURE: 98 F | RESPIRATION RATE: 16 BRPM | OXYGEN SATURATION: 93 % | HEART RATE: 74 BPM | SYSTOLIC BLOOD PRESSURE: 100 MMHG | WEIGHT: 208.6 LBS | HEIGHT: 74 IN | DIASTOLIC BLOOD PRESSURE: 70 MMHG

## 2020-07-28 DIAGNOSIS — E11.9 TYPE 2 DIABETES MELLITUS WITHOUT COMPLICATION, WITHOUT LONG-TERM CURRENT USE OF INSULIN (HCC): Primary | ICD-10-CM

## 2020-07-28 DIAGNOSIS — M77.02 MEDIAL EPICONDYLITIS OF BOTH ELBOWS: ICD-10-CM

## 2020-07-28 DIAGNOSIS — E11.9 TYPE 2 DIABETES MELLITUS WITHOUT COMPLICATION, WITHOUT LONG-TERM CURRENT USE OF INSULIN (HCC): ICD-10-CM

## 2020-07-28 DIAGNOSIS — M53.3 COCCYX DISORDER: ICD-10-CM

## 2020-07-28 DIAGNOSIS — M77.01 MEDIAL EPICONDYLITIS OF BOTH ELBOWS: ICD-10-CM

## 2020-07-28 LAB
CREAT UR-MCNC: 129.65 MG/DL
HBA1C MFR BLD: 7.8 % (ref 0–5.6)
INT CON NEG: NEGATIVE
INT CON POS: POSITIVE
MICROALBUMIN UR-MCNC: <1.2 MG/DL
MICROALBUMIN/CREAT UR: NORMAL MG/G (ref 0–30)

## 2020-07-28 PROCEDURE — 83036 HEMOGLOBIN GLYCOSYLATED A1C: CPT | Performed by: FAMILY MEDICINE

## 2020-07-28 PROCEDURE — 82043 UR ALBUMIN QUANTITATIVE: CPT

## 2020-07-28 PROCEDURE — 99214 OFFICE O/P EST MOD 30 MIN: CPT | Performed by: FAMILY MEDICINE

## 2020-07-28 PROCEDURE — 82570 ASSAY OF URINE CREATININE: CPT

## 2020-07-28 PROCEDURE — 99000 SPECIMEN HANDLING OFFICE-LAB: CPT | Performed by: FAMILY MEDICINE

## 2020-07-28 ASSESSMENT — FIBROSIS 4 INDEX: FIB4 SCORE: 1.06

## 2020-07-28 NOTE — ASSESSMENT & PLAN NOTE
This is a chronic condition. He reports he will get sores on his tail bone that are painful. It will get chapped then crack open. He will use an antibiotic salve which will resolve the lesions. No lesions currently.

## 2020-07-28 NOTE — TELEPHONE ENCOUNTER
DOCUMENTATION OF PAR STATUS:    1. Name of Medication & Dose: Januvia      2. Name of Prescription Coverage Company & phone #: [employer]     3. Date Prior Auth Submitted: 7/28/20    4. What information was given to obtain insurance decision? OV note from 7/28/20    5. Prior Auth Status? Approved     6. Patient Notified: no    PA completed via covermymeds, approved, scanned to media, FYI faxed to pharmacy.

## 2020-07-28 NOTE — PROGRESS NOTES
Subjective:     CC: f/u DM, elbow pain, coccyx issue    HPI:   José presents today with     Type 2 diabetes mellitus without complication  This is a chronic condition.   Current medications: empagliflozin-metformin ER 12.5-1000 mg bid (max dose)  Last A1c: 8.3% (2020); 7.8% today  Last Microalb/Cr ratio: normal (2019)  Last diabetic foot exam: due  Last retinal eye exam: 2019  ACEi/ARB: no - no microalbuminuria  Statin: rosuvastatin 10 mg  Aspirin: no  Concomitant HTN: no - at goal  Nightly foot checks: yes      Medial epicondylitis of both elbows  This is a chronic condition. He has had bilateral elbow pain since at least 10/2018. It is intermittent and not debilitating. He thinks it may be arthritis and uses voltaren gel. I had referred him for PT in the past but he didn't go.     Coccyx disorder  This is a chronic condition. He reports he will get sores on his tail bone that are painful. It will get chapped then crack open. He will use an antibiotic salve which will resolve the lesions. No lesions currently.      Past Medical History:   Diagnosis Date   • Diabetes (HCC)    • Factor V deficiency (HCC)    • Hyperlipidemia        Social History     Tobacco Use   • Smoking status: Former Smoker     Packs/day: 0.00     Last attempt to quit: 11/3/2001     Years since quittin.7   • Smokeless tobacco: Former User     Quit date: 3/1/2011   Substance Use Topics   • Alcohol use: Not Currently     Alcohol/week: 0.0 oz     Comment: drinks non-alcoholic beer   • Drug use: Never       Current Outpatient Medications Ordered in Epic   Medication Sig Dispense Refill   • SITagliptin (JANUVIA) 50 MG Tab Take 1 Tab by mouth every day. 90 Tab 1   • rivaroxaban (XARELTO) 20 MG Tab tablet Take 1 Tab by mouth with dinner. 90 Tab 3   • Empagliflozin-metFORMIN HCl ER 12.5-1000 MG TABLET SR 24 HR Take 1 Tab by mouth 2 times a day. 180 Tab 3   • rosuvastatin (CRESTOR) 10 MG Tab Take 1 Tab by mouth every evening. 30 Tab 11   •  "Multiple Vitamins-Minerals (MULTIVITAMIN ADULTS 50+ PO)      • Omega-3 Fatty Acids (FISH OIL) 1000 MG Cap capsule Take 1,000 mg by mouth 3 times a day, with meals.     • Blood Glucose Test Strips One Touch Ultra Test Strips.  Test once daily and with symptoms of hypoglycemia.  Diagnosis: E11.9 100 Strip 3   • Blood Glucose Monitoring Suppl Device One Touch Ultra glucometer.  Test once daily and with symptoms of hypoglycemia.  Diagnosis: E11.9 1 Device 0   • Cholecalciferol (VITAMIN D3) 2000 units Tab Take  by mouth.       No current Epic-ordered facility-administered medications on file.        Allergies:  Yellow dye    Health Maintenance: Completed    ROS:  Gen: no fevers/chills  Pulm: no sob  CV: no chest pain    Objective:     Exam:  /70 (BP Location: Left arm, Patient Position: Sitting, BP Cuff Size: Adult)   Pulse 74   Temp 36.7 °C (98 °F) (Temporal)   Resp 16   Ht 1.88 m (6' 2\")   Wt 94.6 kg (208 lb 9.6 oz)   SpO2 93%   BMI 26.78 kg/m²  Body mass index is 26.78 kg/m².    Gen: Alert and oriented, No apparent distress.  Neck: Neck is supple without lymphadenopathy.  Lungs: Normal effort, CTA bilaterally, no wheezes, rhonchi, or rales  CV: Regular rate and rhythm. No murmurs, rubs, or gallops.  Ext: No clubbing, cyanosis, edema.  Diabetic foot exam: No lesions or calluses noted. 2+ pedal pulses. Sensation intact with 10 out of 10 on monofilament test.    Assessment & Plan:     49 y.o. male with the following -     1. Type 2 diabetes mellitus without complication, without long-term current use of insulin (HCC)  This is a chronic condition, uncontrolled.  A1c in April, 2020 was elevated 8.3%.  Today it is only slightly improved at 7.8%.  Therefore, we are going to add a third agent to his regimen to try to get a better control.  Diabetic foot exam performed today within normal limits.  Urine obtained for urine microalbumin to creatinine ratio and he is going to schedule retinal eye exam.  Therefore, he " will be up-to-date with all of his health maintenance.  -Continue empagliflozin-metformin ER 12.5-1000 mg twice daily  -Start sitagliptin 50 mg daily  - POCT Hemoglobin A1C  - MICROALBUMIN CREAT RATIO URINE; Future  - Diabetic Monofilament Lower Extremity Exam    2. Medial epicondylitis of both elbows  This is a chronic condition, unchanged.  He has been having bilateral elbow pain since at least October, 2018.  Previously I suspect it was medial epicondylitis and referred him to physical therapy but he never did go.  He states is not severe or debilitating and he suspects may be secondary to arthritis and has been using some over-the-counter Voltaren gel for it.  However, he is interested in seeing physical therapy to see if they have any recommendations for treatment so I placed a new referral.  - REFERRAL TO PHYSICAL THERAPY Reason for Therapy: Eval/Treat/Report    3. Coccyx disorder  This is a chronic condition, new to me.  He states that for some time now he will get intermittent episodes of lesions on his tailbone that sound like open wounds that are very painful.  He does not have any lesions currently.  I strongly encouraged him to come see me when he has an active lesion so I can evaluate and possibly culture it.      Return in about 3 months (around 10/28/2020) for f/u DM, get A1c.    Please note that this dictation was created using voice recognition software. I have made every reasonable attempt to correct obvious errors, but I expect that there are errors of grammar and possibly content that I did not discover before finalizing the note.

## 2020-07-28 NOTE — ASSESSMENT & PLAN NOTE
This is a chronic condition.   Current medications: empagliflozin-metformin ER 12.5-1000 mg bid (max dose)  Last A1c: 8.3% (4/2020); 7.8% today  Last Microalb/Cr ratio: normal (8/2019)  Last diabetic foot exam: due  Last retinal eye exam: 4/2019  ACEi/ARB: no - no microalbuminuria  Statin: rosuvastatin 10 mg  Aspirin: no  Concomitant HTN: no - at goal  Nightly foot checks: yes

## 2020-07-28 NOTE — ASSESSMENT & PLAN NOTE
This is a chronic condition. He has had bilateral elbow pain since at least 10/2018. It is intermittent and not debilitating. He thinks it may be arthritis and uses voltaren gel. I had referred him for PT in the past but he didn't go.

## 2020-10-06 ENCOUNTER — NON-PROVIDER VISIT (OUTPATIENT)
Dept: MEDICAL GROUP | Facility: PHYSICIAN GROUP | Age: 50
End: 2020-10-06
Payer: COMMERCIAL

## 2020-10-06 DIAGNOSIS — Z23 NEED FOR VACCINATION: ICD-10-CM

## 2020-10-06 PROCEDURE — 90471 IMMUNIZATION ADMIN: CPT | Performed by: FAMILY MEDICINE

## 2020-10-06 PROCEDURE — 90686 IIV4 VACC NO PRSV 0.5 ML IM: CPT | Performed by: FAMILY MEDICINE

## 2020-10-06 NOTE — PROGRESS NOTES
"José Bray is a 50 y.o. male here for a non-provider visit for:   FLU    Reason for immunization: Annual Flu Vaccine  Immunization records indicate need for vaccine: Yes, confirmed with Epic  Minimum interval has been met for this vaccine: Yes  ABN completed: Yes    Order and dose verified by: Victoria Bashir  VIS Dated  8/15/2019 was given to patient: Yes  All IAC Questionnaire questions were answered \"No.\"    Patient tolerated injection and no adverse effects were observed or reported: Yes    Pt scheduled for next dose in series: Not Indicated    "

## 2020-10-29 ENCOUNTER — OFFICE VISIT (OUTPATIENT)
Dept: MEDICAL GROUP | Facility: PHYSICIAN GROUP | Age: 50
End: 2020-10-29
Payer: COMMERCIAL

## 2020-10-29 VITALS
HEART RATE: 73 BPM | HEIGHT: 74 IN | TEMPERATURE: 98.1 F | WEIGHT: 211 LBS | DIASTOLIC BLOOD PRESSURE: 70 MMHG | RESPIRATION RATE: 16 BRPM | SYSTOLIC BLOOD PRESSURE: 100 MMHG | BODY MASS INDEX: 27.08 KG/M2 | OXYGEN SATURATION: 94 %

## 2020-10-29 DIAGNOSIS — E11.9 TYPE 2 DIABETES MELLITUS WITHOUT COMPLICATION, WITHOUT LONG-TERM CURRENT USE OF INSULIN (HCC): Primary | ICD-10-CM

## 2020-10-29 DIAGNOSIS — M94.0 COSTOCHONDRITIS: ICD-10-CM

## 2020-10-29 DIAGNOSIS — Z23 NEED FOR VACCINATION: ICD-10-CM

## 2020-10-29 LAB
HBA1C MFR BLD: 7.3 % (ref 0–5.6)
INT CON NEG: NEGATIVE
INT CON POS: POSITIVE

## 2020-10-29 PROCEDURE — 99214 OFFICE O/P EST MOD 30 MIN: CPT | Mod: 25 | Performed by: FAMILY MEDICINE

## 2020-10-29 PROCEDURE — 90750 HZV VACC RECOMBINANT IM: CPT | Performed by: FAMILY MEDICINE

## 2020-10-29 PROCEDURE — 83036 HEMOGLOBIN GLYCOSYLATED A1C: CPT | Performed by: FAMILY MEDICINE

## 2020-10-29 PROCEDURE — 90471 IMMUNIZATION ADMIN: CPT | Performed by: FAMILY MEDICINE

## 2020-10-29 ASSESSMENT — FIBROSIS 4 INDEX: FIB4 SCORE: 1.08

## 2020-10-29 NOTE — PROGRESS NOTES
Subjective:     CC: f/u DM    HPI:   José presents today with     Type 2 diabetes mellitus without complication  This is a chronic condition.   Current medications: empagliflozin-metformin ER 12.5-1000 mg bid (max dose), sitagliptin 50 mg daily  Last A1c: 7.3% (today); 7.8% (2020)  Last Microalb/Cr ratio: normal (2020)  Last diabetic foot exam: 2020  Last retinal eye exam: recent - requesting records  ACEi/ARB: no - no microalbuminuria  Statin: rosuvastatin 10 mg  Aspirin: no  Concomitant HTN: no - at goal  Nightly foot checks: yes        Past Medical History:   Diagnosis Date   • Diabetes (HCC)    • Factor V deficiency (HCC)    • Hyperlipidemia        Social History     Tobacco Use   • Smoking status: Former Smoker     Packs/day: 0.00     Quit date: 11/3/2001     Years since quittin.0   • Smokeless tobacco: Former User     Quit date: 3/1/2011   Substance Use Topics   • Alcohol use: Not Currently     Alcohol/week: 0.0 oz     Comment: drinks non-alcoholic beer   • Drug use: Never       Current Outpatient Medications Ordered in Epic   Medication Sig Dispense Refill   • SITagliptin (JANUVIA) 100 MG Tab Take 1 Tab by mouth every day. 90 Tab 1   • rivaroxaban (XARELTO) 20 MG Tab tablet Take 1 Tab by mouth with dinner. 90 Tab 3   • Empagliflozin-metFORMIN HCl ER 12.5-1000 MG TABLET SR 24 HR Take 1 Tab by mouth 2 times a day. 180 Tab 3   • rosuvastatin (CRESTOR) 10 MG Tab Take 1 Tab by mouth every evening. 30 Tab 11   • Multiple Vitamins-Minerals (MULTIVITAMIN ADULTS 50+ PO)      • Omega-3 Fatty Acids (FISH OIL) 1000 MG Cap capsule Take 1,000 mg by mouth 3 times a day, with meals.     • Cholecalciferol (VITAMIN D3) 2000 units Tab Take  by mouth.     • Blood Glucose Test Strips One Touch Ultra Test Strips.  Test once daily and with symptoms of hypoglycemia.  Diagnosis: E11.9 (Patient not taking: Reported on 10/29/2020) 100 Strip 3   • Blood Glucose Monitoring Suppl Device One Touch Ultra glucometer.  Test once  "daily and with symptoms of hypoglycemia.  Diagnosis: E11.9 (Patient not taking: Reported on 10/29/2020) 1 Device 0     No current Epic-ordered facility-administered medications on file.        Allergies:  Yellow dye    Health Maintenance: Completed    ROS:  Gen: no fevers/chills  Pulm: no sob  CV: + chest pain - at sternum, sharp, costochondritis    Objective:     Exam:  /70 (BP Location: Right arm, Patient Position: Sitting, BP Cuff Size: Adult)   Pulse 73   Temp 36.7 °C (98.1 °F) (Temporal)   Resp 16   Ht 1.88 m (6' 2\")   Wt 95.7 kg (211 lb)   SpO2 94%   BMI 27.09 kg/m²  Body mass index is 27.09 kg/m².    Gen: Alert and oriented, No apparent distress.  Neck: Neck is supple without lymphadenopathy.  Lungs: Normal effort, CTA bilaterally, no wheezes, rhonchi, or rales  CV: Regular rate and rhythm. No murmurs, rubs, or gallops.  Ext: No clubbing, cyanosis, edema.    Assessment & Plan:     50 y.o. male with the following -     1. Type 2 diabetes mellitus without complication, without long-term current use of insulin (HCC)  This is a chronic condition, improving.  At his last appointment we added sitagliptin to his regimen and his hemoglobin A1c went down from 7.8% to 7.3% today in the office.  We are going to max out the sitagliptin dose with her ultimate goal to get them under 7%.  I have encouraged him to continue working on limiting carbohydrates and sugars in his diet.  He is up-to-date with all of his health maintenance, including the retinal eye exam but we do need to request records for that.  -Continue empagliflozin-metformin ER 12.5-1000 mg twice daily  -Increase sitagliptin to 100 mg daily  - POCT Hemoglobin A1C    2. Costochondritis  This is a new condition.  During review of systems he mentioned that he is been getting some chest pain he feels the pain right where the ribs meet the sternum and it is a sharp, stabbing pain.  I suspect this is secondary to costochondritis and discussed " conservative management with him.    3. Need for vaccination  - Shingles Vaccine (Shingrix)    Return in about 3 months (around 1/29/2021) for f/u DM, get A1c.    Please note that this dictation was created using voice recognition software. I have made every reasonable attempt to correct obvious errors, but I expect that there are errors of grammar and possibly content that I did not discover before finalizing the note.

## 2020-10-29 NOTE — LETTER
[facility]  Carolina Adkins M.D.  [address]  [place] [address]  Fax: [phone]   Authorization for Release/Disclosure of   Protected Health Information   Name: JOSÉ GARCIA : 1970 SSN: [id number]   Address: [address] [place] [address]  [place] [address] Phone:    [phone] (home) [phone] (work)   I authorize the entity listed below to release/disclose the PHI below to:   [facility]/Carolina Adkins M.D. and Carolina Adkins M.D.   Provider or Entity Name:  Dr. Jeffrey - ophthalmology   Address   City, State, [facility]   Phone:      Fax:     Reason for request: continuity of care   Information to be released:    [  ] LAST COLONOSCOPY,  including any PATH REPORT and follow-up  [  ] LAST FIT/COLOGUARD RESULT [  ] LAST DEXA  [  ] LAST MAMMOGRAM  [  ] LAST PAP  [  ] LAST LABS [X] RETINA EXAM REPORT  [  ] IMMUNIZATION RECORDS  [  ] Release all info      [  ] Check here and initial the line next to each item to release ALL health information INCLUDING  _____ Care and treatment for drug and / or alcohol abuse  _____ HIV testing, infection status, or AIDS  _____ Genetic Testing    DATES OF SERVICE OR TIME PERIOD TO BE DISCLOSED: _____________  I understand and acknowledge that:  * This Authorization may be revoked at any time by you in writing, except if your health information has already been used or disclosed.  * Your health information that will be used or disclosed as a result of you signing this authorization could be re-disclosed by the recipient. If this occurs, your re-disclosed health information may no longer be protected by State or Federal laws.  * You may refuse to sign this Authorization. Your refusal will not affect your ability to obtain treatment.  * This Authorization becomes effective upon signing and will  on (date) __________.      If no date is indicated, this Authorization will  one (1) year from the signature date.    Name: José Weinstein  Katarina    Signature:   Date:     10/29/2020       PLEASE FAX REQUESTED RECORDS BACK TO: [phone]

## 2020-12-09 ENCOUNTER — APPOINTMENT (RX ONLY)
Dept: URBAN - METROPOLITAN AREA CLINIC 4 | Facility: CLINIC | Age: 50
Setting detail: DERMATOLOGY
End: 2020-12-09

## 2020-12-09 DIAGNOSIS — L30.9 DERMATITIS, UNSPECIFIED: ICD-10-CM

## 2020-12-09 PROCEDURE — ? MEDICATION COUNSELING

## 2020-12-09 PROCEDURE — ? COUNSELING

## 2020-12-09 PROCEDURE — ? PRESCRIPTION

## 2020-12-09 PROCEDURE — 99213 OFFICE O/P EST LOW 20 MIN: CPT

## 2020-12-09 PROCEDURE — ? ADDITIONAL NOTES

## 2020-12-09 RX ORDER — NYSTATIN AND TRIAMCINOLONE ACETONIDE 100000; 1 [USP'U]/G; MG/G
1 CREAM TOPICAL BID
Qty: 1 | Refills: 2 | Status: ERX | COMMUNITY
Start: 2020-12-09

## 2020-12-09 RX ADMIN — NYSTATIN AND TRIAMCINOLONE ACETONIDE 1: 100000; 1 CREAM TOPICAL at 00:00

## 2020-12-09 ASSESSMENT — LOCATION SIMPLE DESCRIPTION DERM
LOCATION SIMPLE: GLUTEAL CLEFT
LOCATION SIMPLE: LOWER BACK
LOCATION SIMPLE: RIGHT LOWER BACK

## 2020-12-09 ASSESSMENT — LOCATION ZONE DERM: LOCATION ZONE: TRUNK

## 2020-12-09 ASSESSMENT — LOCATION DETAILED DESCRIPTION DERM
LOCATION DETAILED: SACRAL SPINE
LOCATION DETAILED: GLUTEAL CLEFT
LOCATION DETAILED: RIGHT INFERIOR MEDIAL LOWER BACK

## 2020-12-09 NOTE — HPI: RASH
Is The Patient Presenting As Previously Scheduled?: No, they are coming in before their scheduled appointment
How Severe Is Your Rash?: moderate
Is This A New Presentation, Or A Follow-Up?: Rash
Additional History: Patient applied an OTC salve which didn’t do anything. Pt did increase his Juanuvia from 50 mg to 100 mg 4-6 weeks ago.  No history of rashes or eczema. History of seasonal allergies.

## 2020-12-09 NOTE — PROCEDURE: MEDICATION COUNSELING
Cimetidine Counseling:  I discussed with the patient the risks of Cimetidine including but not limited to gynecomastia, headache, diarrhea, nausea, drowsiness, arrhythmias, pancreatitis, skin rashes, psychosis, bone marrow suppression and kidney toxicity.
Topical Clindamycin Pregnancy And Lactation Text: This medication is Pregnancy Category B and is considered safe during pregnancy. It is unknown if it is excreted in breast milk.
Cellcept Pregnancy And Lactation Text: This medication is Pregnancy Category D and isn't considered safe during pregnancy. It is unknown if this medication is excreted in breast milk.
Dapsone Pregnancy And Lactation Text: This medication is Pregnancy Category C and is not considered safe during pregnancy or breast feeding.
Otezla Pregnancy And Lactation Text: This medication is Pregnancy Category C and it isn't known if it is safe during pregnancy. It is unknown if it is excreted in breast milk.
Quinolones Pregnancy And Lactation Text: This medication is Pregnancy Category C and it isn't know if it is safe during pregnancy. It is also excreted in breast milk.
Imiquimod Counseling:  I discussed with the patient the risks of imiquimod including but not limited to erythema, scaling, itching, weeping, crusting, and pain.  Patient understands that the inflammatory response to imiquimod is variable from person to person and was educated regarded proper titration schedule.  If flu-like symptoms develop, patient knows to discontinue the medication and contact us.
Stelara Counseling:  I discussed with the patient the risks of ustekinumab including but not limited to immunosuppression, malignancy, posterior leukoencephalopathy syndrome, and serious infections.  The patient understands that monitoring is required including a PPD at baseline and must alert us or the primary physician if symptoms of infection or other concerning signs are noted.
Topical Clindamycin Counseling: Patient counseled that this medication may cause skin irritation or allergic reactions.  In the event of skin irritation, the patient was advised to reduce the amount of the drug applied or use it less frequently.   The patient verbalized understanding of the proper use and possible adverse effects of clindamycin.  All of the patient's questions and concerns were addressed.
Dupixent Counseling: I discussed with the patient the risks of dupilumab including but not limited to eye infection and irritation, cold sores, injection site reactions, worsening of asthma, allergic reactions and increased risk of parasitic infection.  Live vaccines should be avoided while taking dupilumab. Dupilumab will also interact with certain medications such as warfarin and cyclosporine. The patient understands that monitoring is required and they must alert us or the primary physician if symptoms of infection or other concerning signs are noted.
Erivedge Counseling- I discussed with the patient the risks of Erivedge including but not limited to nausea, vomiting, diarrhea, constipation, weight loss, changes in the sense of taste, decreased appetite, muscle spasms, and hair loss.  The patient verbalized understanding of the proper use and possible adverse effects of Erivedge.  All of the patient's questions and concerns were addressed.
Dupixent Pregnancy And Lactation Text: This medication likely crosses the placenta but the risk for the fetus is uncertain. This medication is excreted in breast milk.
Cimetidine Pregnancy And Lactation Text: This medication is Pregnancy Category B and is considered safe during pregnancy. It is also excreted in breast milk and breast feeding isn't recommended.
Cyclophosphamide Counseling:  I discussed with the patient the risks of cyclophosphamide including but not limited to hair loss, hormonal abnormalities, decreased fertility, abdominal pain, diarrhea, nausea and vomiting, bone marrow suppression and infection. The patient understands that monitoring is required while taking this medication.
Topical Sulfur Applications Counseling: Topical Sulfur Counseling: Patient counseled that this medication may cause skin irritation or allergic reactions.  In the event of skin irritation, the patient was advised to reduce the amount of the drug applied or use it less frequently.   The patient verbalized understanding of the proper use and possible adverse effects of topical sulfur application.  All of the patient's questions and concerns were addressed.
Rifampin Pregnancy And Lactation Text: This medication is Pregnancy Category C and it isn't know if it is safe during pregnancy. It is also excreted in breast milk and should not be used if you are breast feeding.
Rifampin Counseling: I discussed with the patient the risks of rifampin including but not limited to liver damage, kidney damage, red-orange body fluids, nausea/vomiting and severe allergy.
Oxybutynin Counseling:  I discussed with the patient the risks of oxybutynin including but not limited to skin rash, drowsiness, dry mouth, difficulty urinating, and blurred vision.
Imiquimod Pregnancy And Lactation Text: This medication is Pregnancy Category C. It is unknown if this medication is excreted in breast milk.
Azithromycin Counseling:  I discussed with the patient the risks of azithromycin including but not limited to GI upset, allergic reaction, drug rash, diarrhea, and yeast infections.
Stelara Pregnancy And Lactation Text: This medication is Pregnancy Category B and is considered safe during pregnancy. It is unknown if this medication is excreted in breast milk.
Sarecycline Counseling: Patient advised regarding possible photosensitivity and discoloration of the teeth, skin, lips, tongue and gums.  Patient instructed to avoid sunlight, if possible.  When exposed to sunlight, patients should wear protective clothing, sunglasses, and sunscreen.  The patient was instructed to call the office immediately if the following severe adverse effects occur:  hearing changes, easy bruising/bleeding, severe headache, or vision changes.  The patient verbalized understanding of the proper use and possible adverse effects of sarecycline.  All of the patient's questions and concerns were addressed.
Minoxidil Pregnancy And Lactation Text: This medication has not been assigned a Pregnancy Risk Category but animal studies failed to show danger with the topical medication. It is unknown if the medication is excreted in breast milk.
Cyclophosphamide Pregnancy And Lactation Text: This medication is Pregnancy Category D and it isn't considered safe during pregnancy. This medication is excreted in breast milk.
Topical Sulfur Applications Pregnancy And Lactation Text: This medication is Pregnancy Category C and has an unknown safety profile during pregnancy. It is unknown if this topical medication is excreted in breast milk.
Taltz Pregnancy And Lactation Text: The risk during pregnancy and breastfeeding is uncertain with this medication.
Bactrim Counseling:  I discussed with the patient the risks of sulfa antibiotics including but not limited to GI upset, allergic reaction, drug rash, diarrhea, dizziness, photosensitivity, and yeast infections.  Rarely, more serious reactions can occur including but not limited to aplastic anemia, agranulocytosis, methemoglobinemia, blood dyscrasias, liver or kidney failure, lung infiltrates or desquamative/blistering drug rashes.
Erivedge Pregnancy And Lactation Text: This medication is Pregnancy Category X and is absolutely contraindicated during pregnancy. It is unknown if it is excreted in breast milk.
Azithromycin Pregnancy And Lactation Text: This medication is considered safe during pregnancy and is also secreted in breast milk.
Taltz Counseling: I discussed with the patient the risks of ixekizumab including but not limited to immunosuppression, serious infections, worsening of inflammatory bowel disease and drug reactions.  The patient understands that monitoring is required including a PPD at baseline and must alert us or the primary physician if symptoms of infection or other concerning signs are noted.
Minoxidil Counseling: Minoxidil is a topical medication which can increase blood flow where it is applied. It is uncertain how this medication increases hair growth. Side effects are uncommon and include stinging and allergic reactions.
Enbrel Counseling:  I discussed with the patient the risks of etanercept including but not limited to myelosuppression, immunosuppression, autoimmune hepatitis, demyelinating diseases, lymphoma, and infections.  The patient understands that monitoring is required including a PPD at baseline and must alert us or the primary physician if symptoms of infection or other concerning signs are noted.
Doxepin Counseling:  Patient advised that the medication is sedating and not to drive a car after taking this medication. Patient informed of potential adverse effects including but not limited to dry mouth, urinary retention, and blurry vision.  The patient verbalized understanding of the proper use and possible adverse effects of doxepin.  All of the patient's questions and concerns were addressed.
Benzoyl Peroxide Counseling: Patient counseled that medicine may cause skin irritation and bleach clothing.  In the event of skin irritation, the patient was advised to reduce the amount of the drug applied or use it less frequently.   The patient verbalized understanding of the proper use and possible adverse effects of benzoyl peroxide.  All of the patient's questions and concerns were addressed.
Doxepin Pregnancy And Lactation Text: This medication is Pregnancy Category C and it isn't known if it is safe during pregnancy. It is also excreted in breast milk and breast feeding isn't recommended.
Propranolol Pregnancy And Lactation Text: This medication is Pregnancy Category C and it isn't known if it is safe during pregnancy. It is excreted in breast milk.
Carac Counseling:  I discussed with the patient the risks of Carac including but not limited to erythema, scaling, itching, weeping, crusting, and pain.
Bactrim Pregnancy And Lactation Text: This medication is Pregnancy Category D and is known to cause fetal risk.  It is also excreted in breast milk.
Mirvaso Counseling: Mirvaso is a topical medication which can decrease superficial blood flow where applied. Side effects are uncommon and include stinging, redness and allergic reactions.
Wartpeel Counseling:  I discussed with the patient the risks of Wartpeel including but not limited to erythema, scaling, itching, weeping, crusting, and pain.
Tremfya Counseling: I discussed with the patient the risks of guselkumab including but not limited to immunosuppression, serious infections, worsening of inflammatory bowel disease and drug reactions.  The patient understands that monitoring is required including a PPD at baseline and must alert us or the primary physician if symptoms of infection or other concerning signs are noted.
Benzoyl Peroxide Pregnancy And Lactation Text: This medication is Pregnancy Category C. It is unknown if benzoyl peroxide is excreted in breast milk.
Finasteride Male Counseling: Finasteride Counseling:  I discussed with the patient the risks of use of finasteride including but not limited to decreased libido, decreased ejaculate volume, gynecomastia, and depression. Women should not handle medication.  All of the patient's questions and concerns were addressed.
Cyclosporine Counseling:  I discussed with the patient the risks of cyclosporine including but not limited to hypertension, gingival hyperplasia,myelosuppression, immunosuppression, liver damage, kidney damage, neurotoxicity, lymphoma, and serious infections. The patient understands that monitoring is required including baseline blood pressure, CBC, CMP, lipid panel and uric acid, and then 1-2 times monthly CMP and blood pressure.
Sarecycline Pregnancy And Lactation Text: This medication is Pregnancy Category D and not consider safe during pregnancy. It is also excreted in breast milk.
Propranolol Counseling:  I discussed with the patient the risks of propranolol including but not limited to low heart rate, low blood pressure, low blood sugar, restlessness and increased cold sensitivity. They should call the office if they experience any of these side effects.
Cephalexin Counseling: I counseled the patient regarding use of cephalexin as an antibiotic for prophylactic and/or therapeutic purposes. Cephalexin (commonly prescribed under brand name Keflex) is a cephalosporin antibiotic which is active against numerous classes of bacteria, including most skin bacteria. Side effects may include nausea, diarrhea, gastrointestinal upset, rash, hives, yeast infections, and in rare cases, hepatitis, kidney disease, seizures, fever, confusion, neurologic symptoms, and others. Patients with severe allergies to penicillin medications are cautioned that there is about a 10% incidence of cross-reactivity with cephalosporins. When possible, patients with penicillin allergies should use alternatives to cephalosporins for antibiotic therapy.
Birth Control Pills Counseling: Birth Control Pill Counseling: I discussed with the patient the potential side effects of OCPs including but not limited to increased risk of stroke, heart attack, thrombophlebitis, deep venous thrombosis, hepatic adenomas, breast changes, GI upset, headaches, and depression.  The patient verbalized understanding of the proper use and possible adverse effects of OCPs. All of the patient's questions and concerns were addressed.
Wartpeel Pregnancy And Lactation Text: This medication is Pregnancy Category X and contraindicated in pregnancy and in women who may become pregnant. It is unknown if this medication is excreted in breast milk.
Finasteride Pregnancy And Lactation Text: This medication is absolutely contraindicated during pregnancy. It is unknown if it is excreted in breast milk.
Humira Counseling:  I discussed with the patient the risks of adalimumab including but not limited to myelosuppression, immunosuppression, autoimmune hepatitis, demyelinating diseases, lymphoma, and serious infections.  The patient understands that monitoring is required including a PPD at baseline and must alert us or the primary physician if symptoms of infection or other concerning signs are noted.
Mirvaso Pregnancy And Lactation Text: This medication has not been assigned a Pregnancy Risk Category. It is unknown if the medication is excreted in breast milk.
Hydroxyzine Counseling: Patient advised that the medication is sedating and not to drive a car after taking this medication.  Patient informed of potential adverse effects including but not limited to dry mouth, urinary retention, and blurry vision.  The patient verbalized understanding of the proper use and possible adverse effects of hydroxyzine.  All of the patient's questions and concerns were addressed.
Cyclosporine Pregnancy And Lactation Text: This medication is Pregnancy Category C and it isn't know if it is safe during pregnancy. This medication is excreted in breast milk.
Tetracycline Counseling: Patient counseled regarding possible photosensitivity and increased risk for sunburn.  Patient instructed to avoid sunlight, if possible.  When exposed to sunlight, patients should wear protective clothing, sunglasses, and sunscreen.  The patient was instructed to call the office immediately if the following severe adverse effects occur:  hearing changes, easy bruising/bleeding, severe headache, or vision changes.  The patient verbalized understanding of the proper use and possible adverse effects of tetracycline.  All of the patient's questions and concerns were addressed. Patient understands to avoid pregnancy while on therapy due to potential birth defects.
Ilumya Counseling: I discussed with the patient the risks of tildrakizumab including but not limited to immunosuppression, malignancy, posterior leukoencephalopathy syndrome, and serious infections.  The patient understands that monitoring is required including a PPD at baseline and must alert us or the primary physician if symptoms of infection or other concerning signs are noted.
Gabapentin Pregnancy And Lactation Text: This medication is Pregnancy Category C and isn't considered safe during pregnancy. It is excreted in breast milk.
Methotrexate Pregnancy And Lactation Text: This medication is Pregnancy Category X and is known to cause fetal harm. This medication is excreted in breast milk.
Birth Control Pills Pregnancy And Lactation Text: This medication should be avoided if pregnant and for the first 30 days post-partum.
Picato Counseling:  I discussed with the patient the risks of Picato including but not limited to erythema, scaling, itching, weeping, crusting, and pain.
Hydroxyzine Pregnancy And Lactation Text: This medication is not safe during pregnancy and should not be taken. It is also excreted in breast milk and breast feeding isn't recommended.
Methotrexate Counseling:  Patient counseled regarding adverse effects of methotrexate including but not limited to nausea, vomiting, abnormalities in liver function tests. Patients may develop mouth sores, rash, diarrhea, and abnormalities in blood counts. The patient understands that monitoring is required including LFT's and blood counts.  There is a rare possibility of scarring of the liver and lung problems that can occur when taking methotrexate. Persistent nausea, loss of appetite, pale stools, dark urine, cough, and shortness of breath should be reported immediately. Patient advised to discontinue methotrexate treatment at least three months before attempting to become pregnant.  I discussed the need for folate supplements while taking methotrexate.  These supplements can decrease side effects during methotrexate treatment. The patient verbalized understanding of the proper use and possible adverse effects of methotrexate.  All of the patient's questions and concerns were addressed.
Zyclara Counseling:  I discussed with the patient the risks of imiquimod including but not limited to erythema, scaling, itching, weeping, crusting, and pain.  Patient understands that the inflammatory response to imiquimod is variable from person to person and was educated regarded proper titration schedule.  If flu-like symptoms develop, patient knows to discontinue the medication and contact us.
Xeljanz Counseling: I discussed with the patient the risks of Xeljanz therapy including increased risk of infection, liver issues, headache, diarrhea, or cold symptoms. Live vaccines should be avoided. They were instructed to call if they have any problems.
Cephalexin Pregnancy And Lactation Text: This medication is Pregnancy Category B and considered safe during pregnancy.  It is also excreted in breast milk but can be used safely for shorter doses.
Gabapentin Counseling: I discussed with the patient the risks of gabapentin including but not limited to dizziness, somnolence, fatigue and ataxia.
Calcipotriene Pregnancy And Lactation Text: This medication has not been proven safe during pregnancy. It is unknown if this medication is excreted in breast milk.
Spironolactone Counseling: Patient advised regarding risks of diarrhea, abdominal pain, hyperkalemia, birth defects (for female patients), liver toxicity and renal toxicity. The patient may need blood work to monitor liver and kidney function and potassium levels while on therapy. The patient verbalized understanding of the proper use and possible adverse effects of spironolactone.  All of the patient's questions and concerns were addressed.
Albendazole Counseling:  I discussed with the patient the risks of albendazole including but not limited to cytopenia, kidney damage, nausea/vomiting and severe allergy.  The patient understands that this medication is being used in an off-label manner.
Glycopyrrolate Counseling:  I discussed with the patient the risks of glycopyrrolate including but not limited to skin rash, drowsiness, dry mouth, difficulty urinating, and blurred vision.
Prednisone Counseling:  I discussed with the patient the risks of prolonged use of prednisone including but not limited to weight gain, insomnia, osteoporosis, mood changes, diabetes, susceptibility to infection, glaucoma and high blood pressure.  In cases where prednisone use is prolonged, patients should be monitored with blood pressure checks, serum glucose levels and an eye exam.  Additionally, the patient may need to be placed on GI prophylaxis, PCP prophylaxis, and calcium and vitamin D supplementation and/or a bisphosphonate.  The patient verbalized understanding of the proper use and the possible adverse effects of prednisone.  All of the patient's questions and concerns were addressed.
Calcipotriene Counseling:  I discussed with the patient the risks of calcipotriene including but not limited to erythema, scaling, itching, and irritation.
Clindamycin Counseling: I counseled the patient regarding use of clindamycin as an antibiotic for prophylactic and/or therapeutic purposes. Clindamycin is active against numerous classes of bacteria, including skin bacteria. Side effects may include nausea, diarrhea, gastrointestinal upset, rash, hives, yeast infections, and in rare cases, colitis.
Xelalyssaz Pregnancy And Lactation Text: This medication is Pregnancy Category D and is not considered safe during pregnancy.  The risk during breast feeding is also uncertain.
Albendazole Pregnancy And Lactation Text: This medication is Pregnancy Category C and it isn't known if it is safe during pregnancy. It is also excreted in breast milk.
Opioid Counseling: I discussed with the patient the potential side effects of opioids including but not limited to addiction, altered mental status, and depression. I stressed avoiding alcohol, benzodiazepines, muscle relaxants and sleep aids unless specifically okayed by a physician. The patient verbalized understanding of the proper use and possible adverse effects of opioids. All of the patient's questions and concerns were addressed. They were instructed to flush the remaining pills down the toilet if they did not need them for pain.
Clindamycin Pregnancy And Lactation Text: This medication can be used in pregnancy if certain situations. Clindamycin is also present in breast milk.
Glycopyrrolate Pregnancy And Lactation Text: This medication is Pregnancy Category B and is considered safe during pregnancy. It is unknown if it is excreted breast milk.
Spironolactone Pregnancy And Lactation Text: This medication can cause feminization of the male fetus and should be avoided during pregnancy. The active metabolite is also found in breast milk.
5-Fu Counseling: 5-Fluorouracil Counseling:  I discussed with the patient the risks of 5-fluorouracil including but not limited to erythema, scaling, itching, weeping, crusting, and pain.
Fluconazole Counseling:  Patient counseled regarding adverse effects of fluconazole including but not limited to headache, diarrhea, nausea, upset stomach, liver function test abnormalities, taste disturbance, and stomach pain.  There is a rare possibility of liver failure that can occur when taking fluconazole.  The patient understands that monitoring of LFTs and kidney function test may be required, especially at baseline. The patient verbalized understanding of the proper use and possible adverse effects of fluconazole.  All of the patient's questions and concerns were addressed.
Protopic Counseling: Patient may experience a mild burning sensation during topical application. Protopic is not approved in children less than 2 years of age. There have been case reports of hematologic and skin malignancies in patients using topical calcineurin inhibitors although causality is questionable.
Infliximab Counseling:  I discussed with the patient the risks of infliximab including but not limited to myelosuppression, immunosuppression, autoimmune hepatitis, demyelinating diseases, lymphoma, and serious infections.  The patient understands that monitoring is required including a PPD at baseline and must alert us or the primary physician if symptoms of infection or other concerning signs are noted.
Hydroxychloroquine Counseling:  I discussed with the patient that a baseline ophthalmologic exam is needed at the start of therapy and every year thereafter while on therapy. A CBC may also be warranted for monitoring.  The side effects of this medication were discussed with the patient, including but not limited to agranulocytosis, aplastic anemia, seizures, rashes, retinopathy, and liver toxicity. Patient instructed to call the office should any adverse effect occur.  The patient verbalized understanding of the proper use and possible adverse effects of Plaquenil.  All the patient's questions and concerns were addressed.
Ivermectin Counseling:  Patient instructed to take medication on an empty stomach with a full glass of water.  Patient informed of potential adverse effects including but not limited to nausea, diarrhea, dizziness, itching, and swelling of the extremities or lymph nodes.  The patient verbalized understanding of the proper use and possible adverse effects of ivermectin.  All of the patient's questions and concerns were addressed.
Opioid Pregnancy And Lactation Text: These medications can lead to premature delivery and should be avoided during pregnancy. These medications are also present in breast milk in small amounts.
Griseofulvin Counseling:  I discussed with the patient the risks of griseofulvin including but not limited to photosensitivity, cytopenia, liver damage, nausea/vomiting and severe allergy.  The patient understands that this medication is best absorbed when taken with a fatty meal (e.g., ice cream or french fries).
SSKI Counseling:  I discussed with the patient the risks of SSKI including but not limited to thyroid abnormalities, metallic taste, GI upset, fever, headache, acne, arthralgias, paraesthesias, lymphadenopathy, easy bleeding, arrhythmias, and allergic reaction.
Protopic Pregnancy And Lactation Text: This medication is Pregnancy Category C. It is unknown if this medication is excreted in breast milk when applied topically.
Doxycycline Counseling:  Patient counseled regarding possible photosensitivity and increased risk for sunburn.  Patient instructed to avoid sunlight, if possible.  When exposed to sunlight, patients should wear protective clothing, sunglasses, and sunscreen.  The patient was instructed to call the office immediately if the following severe adverse effects occur:  hearing changes, easy bruising/bleeding, severe headache, or vision changes.  The patient verbalized understanding of the proper use and possible adverse effects of doxycycline.  All of the patient's questions and concerns were addressed.
Griseofulvin Pregnancy And Lactation Text: This medication is Pregnancy Category X and is known to cause serious birth defects. It is unknown if this medication is excreted in breast milk but breast feeding should be avoided.
Acitretin Counseling:  I discussed with the patient the risks of acitretin including but not limited to hair loss, dry lips/skin/eyes, liver damage, hyperlipidemia, depression/suicidal ideation, photosensitivity.  Serious rare side effects can include but are not limited to pancreatitis, pseudotumor cerebri, bony changes, clot formation/stroke/heart attack.  Patient understands that alcohol is contraindicated since it can result in liver toxicity and significantly prolong the elimination of the drug by many years.
Erythromycin Counseling:  I discussed with the patient the risks of erythromycin including but not limited to GI upset, allergic reaction, drug rash, diarrhea, increase in liver enzymes, and yeast infections.
Hydroxychloroquine Pregnancy And Lactation Text: This medication has been shown to cause fetal harm but it isn't assigned a Pregnancy Risk Category. There are small amounts excreted in breast milk.
Doxycycline Pregnancy And Lactation Text: This medication is Pregnancy Category D and not consider safe during pregnancy. It is also excreted in breast milk but is considered safe for shorter treatment courses.
Sski Pregnancy And Lactation Text: This medication is Pregnancy Category D and isn't considered safe during pregnancy. It is excreted in breast milk.
Drysol Counseling:  I discussed with the patient the risks of drysol/aluminum chloride including but not limited to skin rash, itching, irritation, burning.
Rhofade Counseling: Rhofade is a topical medication which can decrease superficial blood flow where applied. Side effects are uncommon and include stinging, redness and allergic reactions.
Rituxan Counseling:  I discussed with the patient the risks of Rituxan infusions. Side effects can include infusion reactions, severe drug rashes including mucocutaneous reactions, reactivation of latent hepatitis and other infections and rarely progressive multifocal leukoencephalopathy.  All of the patient's questions and concerns were addressed.
Arava Counseling:  Patient counseled regarding adverse effects of Arava including but not limited to nausea, vomiting, abnormalities in liver function tests. Patients may develop mouth sores, rash, diarrhea, and abnormalities in blood counts. The patient understands that monitoring is required including LFTs and blood counts.  There is a rare possibility of scarring of the liver and lung problems that can occur when taking methotrexate. Persistent nausea, loss of appetite, pale stools, dark urine, cough, and shortness of breath should be reported immediately. Patient advised to discontinue Arava treatment and consult with a physician prior to attempting conception. The patient will have to undergo a treatment to eliminate Arava from the body prior to conception.
Itraconazole Counseling:  I discussed with the patient the risks of itraconazole including but not limited to liver damage, nausea/vomiting, neuropathy, and severe allergy.  The patient understands that this medication is best absorbed when taken with acidic beverages such as non-diet cola or ginger ale.  The patient understands that monitoring is required including baseline LFTs and repeat LFTs at intervals.  The patient understands that they are to contact us or the primary physician if concerning signs are noted.
Elidel Counseling: Patient may experience a mild burning sensation during topical application. Elidel is not approved in children less than 2 years of age. There have been case reports of hematologic and skin malignancies in patients using topical calcineurin inhibitors although causality is questionable.
Solaraze Counseling:  I discussed with the patient the risks of Solaraze including but not limited to erythema, scaling, itching, weeping, crusting, and pain.
Erythromycin Pregnancy And Lactation Text: This medication is Pregnancy Category B and is considered safe during pregnancy. It is also excreted in breast milk.
Niacinamide Counseling: I recommended taking niacin or niacinamide, also know as vitamin B3, twice daily. Recent evidence suggests that taking vitamin B3 (500 mg twice daily) can reduce the risk of actinic keratoses and non-melanoma skin cancers. Side effects of vitamin B3 include flushing and headache.
Acitretin Pregnancy And Lactation Text: This medication is Pregnancy Category X and should not be given to women who are pregnant or may become pregnant in the future. This medication is excreted in breast milk.
Drysol Pregnancy And Lactation Text: This medication is considered safe during pregnancy and breast feeding.
Thalidomide Counseling: I discussed with the patient the risks of thalidomide including but not limited to birth defects, anxiety, weakness, chest pain, dizziness, cough and severe allergy.
Rituxan Pregnancy And Lactation Text: This medication is Pregnancy Category C and it isn't know if it is safe during pregnancy. It is unknown if this medication is excreted in breast milk but similar antibodies are known to be excreted.
Tranexamic Acid Counseling:  Patient advised of the small risk of bleeding problems with tranexamic acid. They were also instructed to call if they developed any nausea, vomiting or diarrhea. All of the patient's questions and concerns were addressed.
Metronidazole Counseling:  I discussed with the patient the risks of metronidazole including but not limited to seizures, nausea/vomiting, a metallic taste in the mouth, nausea/vomiting and severe allergy.
Xolair Counseling:  Patient informed of potential adverse effects including but not limited to fever, muscle aches, rash and allergic reactions.  The patient verbalized understanding of the proper use and possible adverse effects of Xolair.  All of the patient's questions and concerns were addressed.
Solaraze Pregnancy And Lactation Text: This medication is Pregnancy Category B and is considered safe. There is some data to suggest avoiding during the third trimester. It is unknown if this medication is excreted in breast milk.
Detail Level: Zone
Niacinamide Pregnancy And Lactation Text: These medications are considered safe during pregnancy.
Siliq Counseling:  I discussed with the patient the risks of Siliq including but not limited to new or worsening depression, suicidal thoughts and behavior, immunosuppression, malignancy, posterior leukoencephalopathy syndrome, and serious infections.  The patient understands that monitoring is required including a PPD at baseline and must alert us or the primary physician if symptoms of infection or other concerning signs are noted. There is also a special program designed to monitor depression which is required with Siliq.
Bexarotene Counseling:  I discussed with the patient the risks of bexarotene including but not limited to hair loss, dry lips/skin/eyes, liver abnormalities, hyperlipidemia, pancreatitis, depression/suicidal ideation, photosensitivity, drug rash/allergic reactions, hypothyroidism, anemia, leukopenia, infection, cataracts, and teratogenicity.  Patient understands that they will need regular blood tests to check lipid profile, liver function tests, white blood cell count, thyroid function tests and pregnancy test if applicable.
Ketoconazole Counseling:   Patient counseled regarding improving absorption with orange juice.  Adverse effects include but are not limited to breast enlargement, headache, diarrhea, nausea, upset stomach, liver function test abnormalities, taste disturbance, and stomach pain.  There is a rare possibility of liver failure that can occur when taking ketoconazole. The patient understands that monitoring of LFTs may be required, especially at baseline. The patient verbalized understanding of the proper use and possible adverse effects of ketoconazole.  All of the patient's questions and concerns were addressed.
Nsaids Pregnancy And Lactation Text: These medications are considered safe up to 30 weeks gestation. It is excreted in breast milk.
Isotretinoin Counseling: Patient should get monthly blood tests, not donate blood, not drive at night if vision affected, not share medication, and not undergo elective surgery for 6 months after tx completed. Side effects reviewed, pt to contact office should one occur.
Tranexamic Acid Pregnancy And Lactation Text: It is unknown if this medication is safe during pregnancy or breast feeding.
Simponi Counseling:  I discussed with the patient the risks of golimumab including but not limited to myelosuppression, immunosuppression, autoimmune hepatitis, demyelinating diseases, lymphoma, and serious infections.  The patient understands that monitoring is required including a PPD at baseline and must alert us or the primary physician if symptoms of infection or other concerning signs are noted.
Topical Retinoid counseling:  Patient advised to apply a pea-sized amount only at bedtime and wait 30 minutes after washing their face before applying.  If too drying, patient may add a non-comedogenic moisturizer. The patient verbalized understanding of the proper use and possible adverse effects of retinoids.  All of the patient's questions and concerns were addressed.
Bexarotene Pregnancy And Lactation Text: This medication is Pregnancy Category X and should not be given to women who are pregnant or may become pregnant. This medication should not be used if you are breast feeding.
Clofazimine Counseling:  I discussed with the patient the risks of clofazimine including but not limited to skin and eye pigmentation, liver damage, nausea/vomiting, gastrointestinal bleeding and allergy.
Xolair Pregnancy And Lactation Text: This medication is Pregnancy Category B and is considered safe during pregnancy. This medication is excreted in breast milk.
Nsaids Counseling: NSAID Counseling: I discussed with the patient that NSAIDs should be taken with food. Prolonged use of NSAIDs can result in the development of stomach ulcers.  Patient advised to stop taking NSAIDs if abdominal pain occurs.  The patient verbalized understanding of the proper use and possible adverse effects of NSAIDs.  All of the patient's questions and concerns were addressed.
Metronidazole Pregnancy And Lactation Text: This medication is Pregnancy Category B and considered safe during pregnancy.  It is also excreted in breast milk.
Odomzo Counseling- I discussed with the patient the risks of Odomzo including but not limited to nausea, vomiting, diarrhea, constipation, weight loss, changes in the sense of taste, decreased appetite, muscle spasms, and hair loss.  The patient verbalized understanding of the proper use and possible adverse effects of Odomzo.  All of the patient's questions and concerns were addressed.
Valtrex Counseling: I discussed with the patient the risks of valacyclovir including but not limited to kidney damage, nausea, vomiting and severe allergy.  The patient understands that if the infection seems to be worsening or is not improving, they are to call.
Include Pregnancy/Lactation Warning?: No
Cimzia Pregnancy And Lactation Text: This medication crosses the placenta but can be considered safe in certain situations. Cimzia may be excreted in breast milk.
Isotretinoin Pregnancy And Lactation Text: This medication is Pregnancy Category X and is considered extremely dangerous during pregnancy. It is unknown if it is excreted in breast milk.
Cimzia Counseling:  I discussed with the patient the risks of Cimzia including but not limited to immunosuppression, allergic reactions and infections.  The patient understands that monitoring is required including a PPD at baseline and must alert us or the primary physician if symptoms of infection or other concerning signs are noted.
Eucrisa Counseling: Patient may experience a mild burning sensation during topical application. Eucrisa is not approved in children less than 2 years of age.
Ketoconazole Pregnancy And Lactation Text: This medication is Pregnancy Category C and it isn't know if it is safe during pregnancy. It is also excreted in breast milk and breast feeding isn't recommended.
Minocycline Counseling: Patient advised regarding possible photosensitivity and discoloration of the teeth, skin, lips, tongue and gums.  Patient instructed to avoid sunlight, if possible.  When exposed to sunlight, patients should wear protective clothing, sunglasses, and sunscreen.  The patient was instructed to call the office immediately if the following severe adverse effects occur:  hearing changes, easy bruising/bleeding, severe headache, or vision changes.  The patient verbalized understanding of the proper use and possible adverse effects of minocycline.  All of the patient's questions and concerns were addressed.
Cosentyx Counseling:  I discussed with the patient the risks of Cosentyx including but not limited to worsening of Crohn's disease, immunosuppression, allergic reactions and infections.  The patient understands that monitoring is required including a PPD at baseline and must alert us or the primary physician if symptoms of infection or other concerning signs are noted.
High Dose Vitamin A Counseling: Side effects reviewed, pt to contact office should one occur.
Valtrex Pregnancy And Lactation Text: this medication is Pregnancy Category B and is considered safe during pregnancy. This medication is not directly found in breast milk but it's metabolite acyclovir is present.
Hydroquinone Counseling:  Patient advised that medication may result in skin irritation, lightening (hypopigmentation), dryness, and burning.  In the event of skin irritation, the patient was advised to reduce the amount of the drug applied or use it less frequently.  Rarely, spots that are treated with hydroquinone can become darker (pseudoochronosis).  Should this occur, patient instructed to stop medication and call the office. The patient verbalized understanding of the proper use and possible adverse effects of hydroquinone.  All of the patient's questions and concerns were addressed.
Terbinafine Counseling: Patient counseling regarding adverse effects of terbinafine including but not limited to headache, diarrhea, rash, upset stomach, liver function test abnormalities, itching, taste/smell disturbance, nausea, abdominal pain, and flatulence.  There is a rare possibility of liver failure that can occur when taking terbinafine.  The patient understands that a baseline LFT and kidney function test may be required. The patient verbalized understanding of the proper use and possible adverse effects of terbinafine.  All of the patient's questions and concerns were addressed.
Azathioprine Counseling:  I discussed with the patient the risks of azathioprine including but not limited to myelosuppression, immunosuppression, hepatotoxicity, lymphoma, and infections.  The patient understands that monitoring is required including baseline LFTs, Creatinine, possible TPMP genotyping and weekly CBCs for the first month and then every 2 weeks thereafter.  The patient verbalized understanding of the proper use and possible adverse effects of azathioprine.  All of the patient's questions and concerns were addressed.
Tazorac Counseling:  Patient advised that medication is irritating and drying.  Patient may need to apply sparingly and wash off after an hour before eventually leaving it on overnight.  The patient verbalized understanding of the proper use and possible adverse effects of tazorac.  All of the patient's questions and concerns were addressed.
Colchicine Counseling:  Patient counseled regarding adverse effects including but not limited to stomach upset (nausea, vomiting, stomach pain, or diarrhea).  Patient instructed to limit alcohol consumption while taking this medication.  Colchicine may reduce blood counts especially with prolonged use.  The patient understands that monitoring of kidney function and blood counts may be required, especially at baseline. The patient verbalized understanding of the proper use and possible adverse effects of colchicine.  All of the patient's questions and concerns were addressed.
Skyrizi Counseling: I discussed with the patient the risks of risankizumab-rzaa including but not limited to immunosuppression, and serious infections.  The patient understands that monitoring is required including a PPD at baseline and must alert us or the primary physician if symptoms of infection or other concerning signs are noted.
Otezla Counseling: The side effects of Otezla were discussed with the patient, including but not limited to worsening or new depression, weight loss, diarrhea, nausea, upper respiratory tract infection, and headache. Patient instructed to call the office should any adverse effect occur.  The patient verbalized understanding of the proper use and possible adverse effects of Otezla.  All the patient's questions and concerns were addressed.
High Dose Vitamin A Pregnancy And Lactation Text: High dose vitamin A therapy is contraindicated during pregnancy and breast feeding.
Dapsone Counseling: I discussed with the patient the risks of dapsone including but not limited to hemolytic anemia, agranulocytosis, rashes, methemoglobinemia, kidney failure, peripheral neuropathy, headaches, GI upset, and liver toxicity.  Patients who start dapsone require monitoring including baseline LFTs and weekly CBCs for the first month, then every month thereafter.  The patient verbalized understanding of the proper use and possible adverse effects of dapsone.  All of the patient's questions and concerns were addressed.
Cellcept Counseling:  I discussed with the patient the risks of mycophenolate mofetil including but not limited to infection/immunosuppression, GI upset, hypokalemia, hypercholesterolemia, bone marrow suppression, lymphoproliferative disorders, malignancy, GI ulceration/bleed/perforation, colitis, interstitial lung disease, kidney failure, progressive multifocal leukoencephalopathy, and birth defects.  The patient understands that monitoring is required including a baseline creatinine and regular CBC testing. In addition, patient must alert us immediately if symptoms of infection or other concerning signs are noted.
Tazorac Pregnancy And Lactation Text: This medication is not safe during pregnancy. It is unknown if this medication is excreted in breast milk.
Quinolones Counseling:  I discussed with the patient the risks of fluoroquinolones including but not limited to GI upset, allergic reaction, drug rash, diarrhea, dizziness, photosensitivity, yeast infections, liver function test abnormalities, tendonitis/tendon rupture.

## 2020-12-09 NOTE — PROCEDURE: ADDITIONAL NOTES
Additional Notes: Discussed possibility of Dermatitis vs Fungal rash. Rash only present 2 days. Gave pt option of biopsy vs trial generic Mycolog.,Patient opts to try topical first. \\nWill send Rx for Mycolog cream today. \\nDiscussed treatment and risks in detail with patient.\\nRecommend Zeosorb AF powder.
Detail Level: Detailed

## 2021-01-11 DIAGNOSIS — E78.2 MIXED HYPERLIPIDEMIA: ICD-10-CM

## 2021-01-11 RX ORDER — ROSUVASTATIN CALCIUM 10 MG/1
10 TABLET, COATED ORAL EVERY EVENING
Qty: 30 TAB | Refills: 0 | Status: SHIPPED | OUTPATIENT
Start: 2021-01-11 | End: 2021-02-16 | Stop reason: SDUPTHER

## 2021-01-15 ENCOUNTER — HEALTH MAINTENANCE LETTER (OUTPATIENT)
Age: 51
End: 2021-01-15

## 2021-01-21 ENCOUNTER — TELEPHONE (OUTPATIENT)
Dept: MEDICAL GROUP | Facility: PHYSICIAN GROUP | Age: 51
End: 2021-01-21

## 2021-01-21 NOTE — TELEPHONE ENCOUNTER
ESTABLISHED PATIENT PRE-VISIT PLANNING     Patient was NOT contacted to complete PVP.     Note: Patient will not be contacted if there is no indication to call.     1.  Reviewed notes from the last few office visits within the medical group: Yes    2.  If any orders were placed at last visit or intended to be done for this visit (i.e. 6 mos follow-up), do we have Results/Consult Notes?         •  Labs - Labs ordered, completed on 10/29/2020 and results are in chart.  Note: If patient appointment is for lab review and patient did not complete labs, check with provider if OK to reschedule patient until labs completed.       •  Imaging - Imaging was not ordered at last office visit.       •  Referrals - No referrals were ordered at last office visit.    3. Is this appointment scheduled as a Hospital Follow-Up? No    4.  Immunizations were updated in Epic using Reconcile Outside Information activity? No    5.  Patient is due for the following Health Maintenance Topics:   Health Maintenance Due   Topic Date Due   • IMM ZOSTER VACCINES (2 of 2) 12/24/2020   • A1C SCREENING  01/29/2021   • SERUM CREATININE  01/30/2021       - Patient plans to schedule appointment for Immunizations: SHINGRIX (Shingles).    6.  AHA (Pulse8) form printed for Provider? N/A

## 2021-01-26 ENCOUNTER — OFFICE VISIT (OUTPATIENT)
Dept: MEDICAL GROUP | Facility: PHYSICIAN GROUP | Age: 51
End: 2021-01-26
Payer: COMMERCIAL

## 2021-01-26 VITALS
SYSTOLIC BLOOD PRESSURE: 100 MMHG | DIASTOLIC BLOOD PRESSURE: 70 MMHG | RESPIRATION RATE: 12 BRPM | HEART RATE: 74 BPM | HEIGHT: 74 IN | WEIGHT: 201.6 LBS | TEMPERATURE: 97.3 F | OXYGEN SATURATION: 94 % | BODY MASS INDEX: 25.87 KG/M2

## 2021-01-26 DIAGNOSIS — E11.9 TYPE 2 DIABETES MELLITUS WITHOUT COMPLICATION, WITHOUT LONG-TERM CURRENT USE OF INSULIN (HCC): ICD-10-CM

## 2021-01-26 DIAGNOSIS — M77.02 MEDIAL EPICONDYLITIS OF BOTH ELBOWS: ICD-10-CM

## 2021-01-26 DIAGNOSIS — M77.01 MEDIAL EPICONDYLITIS OF BOTH ELBOWS: ICD-10-CM

## 2021-01-26 DIAGNOSIS — Z23 NEED FOR VACCINATION: ICD-10-CM

## 2021-01-26 LAB
HBA1C MFR BLD: 7.2 % (ref 0–5.6)
INT CON NEG: NEGATIVE
INT CON POS: POSITIVE

## 2021-01-26 PROCEDURE — 90750 HZV VACC RECOMBINANT IM: CPT | Performed by: FAMILY MEDICINE

## 2021-01-26 PROCEDURE — 90471 IMMUNIZATION ADMIN: CPT | Performed by: FAMILY MEDICINE

## 2021-01-26 PROCEDURE — 83036 HEMOGLOBIN GLYCOSYLATED A1C: CPT | Performed by: FAMILY MEDICINE

## 2021-01-26 PROCEDURE — 99214 OFFICE O/P EST MOD 30 MIN: CPT | Mod: 25 | Performed by: FAMILY MEDICINE

## 2021-01-26 ASSESSMENT — FIBROSIS 4 INDEX: FIB4 SCORE: 1.08

## 2021-01-26 ASSESSMENT — PATIENT HEALTH QUESTIONNAIRE - PHQ9: CLINICAL INTERPRETATION OF PHQ2 SCORE: 0

## 2021-01-26 NOTE — PROGRESS NOTES
Subjective:     CC: f/u DM    HPI:   José presents today with     Type 2 diabetes mellitus without complication  This is a chronic condition. Sitagliptin increased at last visit. Notes abdominal cramping but reports this since starting empagliflozin-metformin combo in 2019. So not new since increasing sitagliptin. He has lost some weight and started a new diet in the last couple of weeks. He wants to focus on plant-based diet.     Current medications: empagliflozin-metformin ER 12.5-1000 mg bid (max dose), sitagliptin 100 mg daily  Last A1c: 7.2% (today); 7.3% (10/2020)  Last Microalb/Cr ratio: normal (2020)  Last diabetic foot exam: 2020  Last retinal eye exam: 10/2020  ACEi/ARB: no - no microalbuminuria  Statin: rosuvastatin 10 mg  Aspirin: no  Concomitant HTN: no - at goal  Nightly foot checks: yes      Medial epicondylitis of both elbows  This is a chronic condition. He has had bilateral elbow pain since at least 10/2018. It is intermittent and not debilitating. He has been trying physical therapy for the last 6 months and it is not getting better.      Past Medical History:   Diagnosis Date   • Diabetes (HCC)    • Factor V deficiency (HCC)    • Hyperlipidemia        Social History     Tobacco Use   • Smoking status: Former Smoker     Packs/day: 0.00     Quit date: 11/3/2001     Years since quittin.2   • Smokeless tobacco: Former User     Quit date: 3/1/2011   Substance Use Topics   • Alcohol use: Not Currently     Alcohol/week: 0.0 oz     Comment: drinks non-alcoholic beer   • Drug use: Never       Current Outpatient Medications Ordered in Epic   Medication Sig Dispense Refill   • rosuvastatin (CRESTOR) 10 MG Tab Take 1 Tab by mouth every evening. 30 Tab 0   • SITagliptin (JANUVIA) 100 MG Tab Take 1 Tab by mouth every day. 90 Tab 1   • rivaroxaban (XARELTO) 20 MG Tab tablet Take 1 Tab by mouth with dinner. 90 Tab 3   • Empagliflozin-metFORMIN HCl ER 12.5-1000 MG TABLET SR 24 HR Take 1 Tab by mouth 2  "times a day. 180 Tab 3   • Multiple Vitamins-Minerals (MULTIVITAMIN ADULTS 50+ PO)      • Omega-3 Fatty Acids (FISH OIL) 1000 MG Cap capsule Take 1,000 mg by mouth 3 times a day, with meals.     • Cholecalciferol (VITAMIN D3) 2000 units Tab Take  by mouth.       No current Epic-ordered facility-administered medications on file.        Allergies:  Yellow dye    Health Maintenance: Completed    ROS:  Gen: no fevers/chills  Pulm: no sob  CV: no chest pain    Objective:     Exam:  /70 (BP Location: Right arm, Patient Position: Sitting, BP Cuff Size: Adult)   Pulse 74   Temp 36.3 °C (97.3 °F) (Temporal)   Resp 12   Ht 1.88 m (6' 2\")   Wt 91.4 kg (201 lb 9.6 oz)   SpO2 94%   BMI 25.88 kg/m²  Body mass index is 25.88 kg/m².    Gen: Alert and oriented, No apparent distress.  Neck: Neck is supple without lymphadenopathy.  Lungs: Normal effort, CTA bilaterally, no wheezes, rhonchi, or rales  CV: Regular rate and rhythm. No murmurs, rubs, or gallops.  Ext: No clubbing, cyanosis, edema.    Assessment & Plan:     50 y.o. male with the following -     1. Type 2 diabetes mellitus without complication, without long-term current use of insulin (HCC)  This is a chronic condition, still slightly uncontrolled.  At his last appointment hemoglobin A1c was elevated at 7.3% we doubled his sitagliptin.  Today's hemoglobin A1c continues to be elevated at 7.2%.  He is up-to-date with all of his diabetic screenings.  I will reach out to our diabetic nurse for her assistance in adjusting his regimen to achieve optimal control.  In the meantime we will continue his current regimen.  -Continue empagliflozin-metformin ER 12.5-1000 mg twice daily  -Continue sitagliptin 100 mg daily  - POCT Hemoglobin A1C    2. Medial epicondylitis of both elbows  This is a chronic condition, unchanged.  Since 2018 is a bilateral medial epicondylitis.  He reports he has been going to physical therapy diligently for the last 6 months without getting any " improvement in his pain.  Therefore, I placed a referral to sports medicine to discuss other treatment options which may include injections.  - REFERRAL TO SPORTS MEDICINE    2. Need for vaccination  - Shingles Vaccine (Shingrix)    Return if symptoms worsen or fail to improve.    Please note that this dictation was created using voice recognition software. I have made every reasonable attempt to correct obvious errors, but I expect that there are errors of grammar and possibly content that I did not discover before finalizing the note.

## 2021-01-26 NOTE — ASSESSMENT & PLAN NOTE
This is a chronic condition. He has had bilateral elbow pain since at least 10/2018. It is intermittent and not debilitating. He has been trying physical therapy for the last 6 months and it is not getting better.

## 2021-01-26 NOTE — ASSESSMENT & PLAN NOTE
This is a chronic condition. Sitagliptin increased at last visit. Notes abdominal cramping but reports this since starting empagliflozin-metformin combo in 2019. So not new since increasing sitagliptin. He has lost some weight and started a new diet in the last couple of weeks. He wants to focus on plant-based diet.     Current medications: empagliflozin-metformin ER 12.5-1000 mg bid (max dose), sitagliptin 100 mg daily  Last A1c: 7.2% (today); 7.3% (10/2020)  Last Microalb/Cr ratio: normal (7/2020)  Last diabetic foot exam: 7/2020  Last retinal eye exam: 10/2020  ACEi/ARB: no - no microalbuminuria  Statin: rosuvastatin 10 mg  Aspirin: no  Concomitant HTN: no - at goal  Nightly foot checks: yes

## 2021-04-19 RX ORDER — EMPAGLIFLOZIN, METFORMIN HYDROCHLORIDE 12.5; 1 MG/1; MG/1
TABLET, EXTENDED RELEASE ORAL
Qty: 180 TABLET | Refills: 3 | Status: SHIPPED
Start: 2021-04-19 | End: 2021-08-12

## 2021-04-28 ENCOUNTER — OFFICE VISIT (OUTPATIENT)
Dept: MEDICAL GROUP | Facility: PHYSICIAN GROUP | Age: 51
End: 2021-04-28
Payer: COMMERCIAL

## 2021-04-28 ENCOUNTER — TELEPHONE (OUTPATIENT)
Dept: MEDICAL GROUP | Facility: PHYSICIAN GROUP | Age: 51
End: 2021-04-28

## 2021-04-28 VITALS
HEART RATE: 64 BPM | HEIGHT: 74 IN | RESPIRATION RATE: 16 BRPM | WEIGHT: 202.4 LBS | SYSTOLIC BLOOD PRESSURE: 96 MMHG | BODY MASS INDEX: 25.98 KG/M2 | OXYGEN SATURATION: 96 % | DIASTOLIC BLOOD PRESSURE: 68 MMHG | TEMPERATURE: 97.9 F

## 2021-04-28 DIAGNOSIS — E11.9 TYPE 2 DIABETES MELLITUS WITHOUT COMPLICATION, WITHOUT LONG-TERM CURRENT USE OF INSULIN (HCC): Primary | ICD-10-CM

## 2021-04-28 DIAGNOSIS — M79.672 LEFT FOOT PAIN: ICD-10-CM

## 2021-04-28 PROBLEM — M79.673 FOOT PAIN: Status: ACTIVE | Noted: 2021-04-28

## 2021-04-28 LAB
HBA1C MFR BLD: 7.9 % (ref 0–5.6)
INT CON NEG: NEGATIVE
INT CON POS: POSITIVE

## 2021-04-28 PROCEDURE — 99214 OFFICE O/P EST MOD 30 MIN: CPT | Performed by: FAMILY MEDICINE

## 2021-04-28 PROCEDURE — 83036 HEMOGLOBIN GLYCOSYLATED A1C: CPT | Performed by: FAMILY MEDICINE

## 2021-04-28 RX ORDER — PIOGLITAZONEHYDROCHLORIDE 45 MG/1
45 TABLET ORAL DAILY
Qty: 90 TABLET | Refills: 1 | Status: SHIPPED | OUTPATIENT
Start: 2021-04-28 | End: 2021-10-29 | Stop reason: SDUPTHER

## 2021-04-28 ASSESSMENT — FIBROSIS 4 INDEX: FIB4 SCORE: 1.08

## 2021-04-28 NOTE — ASSESSMENT & PLAN NOTE
"This is a chronic condition.  At his last appointment we stopped his sitagliptin and started him on Ozempic.  However, he got pretty significant abdominal pain and GI side effects so he stopped the medication. He's been trying to work on a plant-based diet but late night eating is \"my Achilles heel\".    Medications:   Biguanide: metformin ER 1000 mg bid  SGLT2i - empagliflozin 12.5 mg bid (max)  GLP1 - none (didn't tolerate Trulicity or Ozempic)  DPP4i - none (sitagliptin previously but no improvement)    Last A1c: 7.9% (today); 7.2% (1/2021)  Last Microalb/Cr ratio: normal (7/2020)  Last diabetic foot exam: 7/2020  Last retinal eye exam: 10/2020  ACEi/ARB: no - no microalbuminuria  Statin: rosuvastatin 10 mg  Aspirin: no  Concomitant HTN: no - at goal  Nightly foot checks: yes    "

## 2021-04-28 NOTE — ASSESSMENT & PLAN NOTE
Onset: 1 month  Location: L foot - arch and ball of foot  Radiation: up to calf  Duration: intermittent  Timing: worse with initial movement, improves with movement  Quality: tingling, aching    He reports a h/o plantar fasciitis in that foot previously, s/p PRP injection which helped for years.

## 2021-04-28 NOTE — PROGRESS NOTES
"Subjective:     CC: f/u DM    HPI:   José presents today with     Type 2 diabetes mellitus without complication  This is a chronic condition.  At his last appointment we stopped his sitagliptin and started him on Ozempic.  However, he got pretty significant abdominal pain and GI side effects so he stopped the medication. He's been trying to work on a plant-based diet but late night eating is \"my Achilles heel\".    Medications:   Biguanide: metformin ER 1000 mg bid  SGLT2i - empagliflozin 12.5 mg bid (max)  GLP1 - none (didn't tolerate Trulicity or Ozempic)  DPP4i - none (sitagliptin previously but no improvement)    Last A1c: 7.9% (today); 7.2% (1/2021)  Last Microalb/Cr ratio: normal (7/2020)  Last diabetic foot exam: 7/2020  Last retinal eye exam: 10/2020  ACEi/ARB: no - no microalbuminuria  Statin: rosuvastatin 10 mg  Aspirin: no  Concomitant HTN: no - at goal  Nightly foot checks: yes      Foot pain  Onset: 1 month  Location: L foot - arch and ball of foot  Radiation: up to calf  Duration: intermittent  Timing: worse with initial movement, improves with movement  Quality: tingling, aching    He reports a h/o plantar fasciitis in that foot previously, s/p PRP injection which helped for years.       Current Outpatient Medications Ordered in Epic   Medication Sig Dispense Refill   • pioglitazone (ACTOS) 45 MG Tab Take 1 tablet by mouth every day. 90 tablet 1   • SYNJARDY XR 12.5-1000 MG TABLET SR 24 HR TAKE ONE TABLET BY MOUTH TWICE A  tablet 3   • rosuvastatin (CRESTOR) 10 MG Tab Take 1 tablet by mouth every evening. 90 tablet 3   • rivaroxaban (XARELTO) 20 MG Tab tablet Take 1 Tab by mouth with dinner. 90 Tab 3   • Multiple Vitamins-Minerals (MULTIVITAMIN ADULTS 50+ PO)      • Omega-3 Fatty Acids (FISH OIL) 1000 MG Cap capsule Take 1,000 mg by mouth 3 times a day, with meals.     • Cholecalciferol (VITAMIN D3) 2000 units Tab Take  by mouth.       No current Epic-ordered facility-administered medications " "on file.       Health Maintenance: Completed    ROS:  Gen: no fevers/chills  Pulm: no sob  CV: + chest pain - sometimes, brief, sharp      Objective:     Exam:  BP (!) 96/68 (BP Location: Right arm, Patient Position: Sitting, BP Cuff Size: Adult)   Pulse 64   Temp 36.6 °C (97.9 °F) (Temporal)   Resp 16   Ht 1.88 m (6' 2\")   Wt 91.8 kg (202 lb 6.4 oz)   SpO2 96%   BMI 25.99 kg/m²  Body mass index is 25.99 kg/m².    Gen: Alert and oriented, No apparent distress.  Neck: Neck is supple without lymphadenopathy.  Lungs: Normal effort, CTA bilaterally, no wheezes, rhonchi, or rales  CV: Regular rate and rhythm. No murmurs, rubs, or gallops.  Ext: No clubbing, cyanosis, edema.    Assessment & Plan:     50 y.o. male with the following -     1. Type 2 diabetes mellitus without complication, without long-term current use of insulin (HCC)  This is a chronic condition, uncontrolled.  At his last appointment we stopped the sitagliptin and started him on Ozempic.  However, he developed significant abdominal pain and GI side effect so he stopped the Ozempic.  He is currently taking only Synjardy and his A1c continues to be elevated at 7.9%.  We are going to add pioglitazone to his regimen and have him follow-up with me and the diabetic nurse in 3 months.  We will also order his yearly lab work for diabetes including a microalbumin to creatinine ratio though is a little early.  Diabetic foot exam will be due at next visit.  -Continue Synjardy XR 12.5-1000 mg twice daily  -Start pioglitazone 45 mg daily  - POCT A1C  - Comp Metabolic Panel; Future  - Lipid Profile; Future  - MICROALBUMIN CREAT RATIO URINE; Future    2. Left foot pain  This is an acute condition.  For last month he had pain in the arch in the ball of his left foot.  Sometimes it radiates up into the calf.  Is intermittent and it is worse with initial movement but improves as the movement continues.  He does report a history of plantar fasciitis in that foot " previously and has had a PRP injection which helped for years.  I suspect this is plantar fasciitis again.  We did discuss conservative management such as rolling, rolling on a frozen water bottle to add ice to the equation.  I have also placed a referral to podiatry as he is currently training to [employer] in [place] so he will be hiking a lot and he does not want to have the plantar fasciitis affect training.  - REFERRAL TO PODIATRY    Return in about 3 months (around 7/28/2021) for Diabetic RN + me, get A1c.    Please note that this dictation was created using voice recognition software. I have made every reasonable attempt to correct obvious errors, but I expect that there are errors of grammar and possibly content that I did not discover before finalizing the note.

## 2021-06-08 ENCOUNTER — APPOINTMENT (RX ONLY)
Dept: URBAN - METROPOLITAN AREA CLINIC 22 | Facility: CLINIC | Age: 51
Setting detail: DERMATOLOGY
End: 2021-06-08

## 2021-06-08 DIAGNOSIS — D18.0 HEMANGIOMA: ICD-10-CM

## 2021-06-08 DIAGNOSIS — L82.1 OTHER SEBORRHEIC KERATOSIS: ICD-10-CM

## 2021-06-08 DIAGNOSIS — Z71.89 OTHER SPECIFIED COUNSELING: ICD-10-CM

## 2021-06-08 DIAGNOSIS — D22 MELANOCYTIC NEVI: ICD-10-CM

## 2021-06-08 DIAGNOSIS — Z87.2 PERSONAL HISTORY OF DISEASES OF THE SKIN AND SUBCUTANEOUS TISSUE: ICD-10-CM

## 2021-06-08 DIAGNOSIS — L81.4 OTHER MELANIN HYPERPIGMENTATION: ICD-10-CM

## 2021-06-08 PROBLEM — D22.5 MELANOCYTIC NEVI OF TRUNK: Status: ACTIVE | Noted: 2021-06-08

## 2021-06-08 PROBLEM — D18.01 HEMANGIOMA OF SKIN AND SUBCUTANEOUS TISSUE: Status: ACTIVE | Noted: 2021-06-08

## 2021-06-08 PROCEDURE — 99213 OFFICE O/P EST LOW 20 MIN: CPT

## 2021-06-08 PROCEDURE — ? COUNSELING

## 2021-06-08 PROCEDURE — ? SUNSCREEN RECOMMENDATIONS

## 2021-06-08 ASSESSMENT — LOCATION SIMPLE DESCRIPTION DERM
LOCATION SIMPLE: ABDOMEN
LOCATION SIMPLE: LEFT UPPER BACK
LOCATION SIMPLE: RIGHT LOWER BACK
LOCATION SIMPLE: LEFT SHOULDER

## 2021-06-08 ASSESSMENT — LOCATION DETAILED DESCRIPTION DERM
LOCATION DETAILED: LEFT SUPERIOR MEDIAL UPPER BACK
LOCATION DETAILED: EPIGASTRIC SKIN
LOCATION DETAILED: RIGHT SUPERIOR MEDIAL MIDBACK
LOCATION DETAILED: LEFT ANTERIOR SHOULDER

## 2021-06-08 ASSESSMENT — LOCATION ZONE DERM
LOCATION ZONE: TRUNK
LOCATION ZONE: ARM

## 2021-07-20 ENCOUNTER — HOSPITAL ENCOUNTER (OUTPATIENT)
Dept: LAB | Facility: MEDICAL CENTER | Age: 51
End: 2021-07-20
Attending: FAMILY MEDICINE
Payer: COMMERCIAL

## 2021-07-20 DIAGNOSIS — E11.9 TYPE 2 DIABETES MELLITUS WITHOUT COMPLICATION, WITHOUT LONG-TERM CURRENT USE OF INSULIN (HCC): ICD-10-CM

## 2021-07-20 LAB
ALBUMIN SERPL BCP-MCNC: 4.2 G/DL (ref 3.2–4.9)
ALBUMIN/GLOB SERPL: 1.8 G/DL
ALP SERPL-CCNC: 76 U/L (ref 30–99)
ALT SERPL-CCNC: 18 U/L (ref 2–50)
ANION GAP SERPL CALC-SCNC: 10 MMOL/L (ref 7–16)
AST SERPL-CCNC: 21 U/L (ref 12–45)
BILIRUB SERPL-MCNC: 0.6 MG/DL (ref 0.1–1.5)
BUN SERPL-MCNC: 16 MG/DL (ref 8–22)
CALCIUM SERPL-MCNC: 8.7 MG/DL (ref 8.5–10.5)
CHLORIDE SERPL-SCNC: 105 MMOL/L (ref 96–112)
CHOLEST SERPL-MCNC: 136 MG/DL (ref 100–199)
CO2 SERPL-SCNC: 24 MMOL/L (ref 20–33)
CREAT SERPL-MCNC: 0.9 MG/DL (ref 0.5–1.4)
FASTING STATUS PATIENT QL REPORTED: NORMAL
GLOBULIN SER CALC-MCNC: 2.3 G/DL (ref 1.9–3.5)
GLUCOSE SERPL-MCNC: 162 MG/DL (ref 65–99)
HDLC SERPL-MCNC: 46 MG/DL
LDLC SERPL CALC-MCNC: 67 MG/DL
POTASSIUM SERPL-SCNC: 3.8 MMOL/L (ref 3.6–5.5)
PROT SERPL-MCNC: 6.5 G/DL (ref 6–8.2)
SODIUM SERPL-SCNC: 139 MMOL/L (ref 135–145)
TRIGL SERPL-MCNC: 116 MG/DL (ref 0–149)

## 2021-07-20 PROCEDURE — 36415 COLL VENOUS BLD VENIPUNCTURE: CPT

## 2021-07-20 PROCEDURE — 80061 LIPID PANEL: CPT

## 2021-07-20 PROCEDURE — 80053 COMPREHEN METABOLIC PANEL: CPT

## 2021-07-20 PROCEDURE — 82570 ASSAY OF URINE CREATININE: CPT

## 2021-07-20 PROCEDURE — 82043 UR ALBUMIN QUANTITATIVE: CPT

## 2021-07-21 LAB
CREAT UR-MCNC: 86.59 MG/DL
MICROALBUMIN UR-MCNC: <1.2 MG/DL
MICROALBUMIN/CREAT UR: NORMAL MG/G (ref 0–30)

## 2021-08-05 ENCOUNTER — HOSPITAL ENCOUNTER (OUTPATIENT)
Dept: LAB | Facility: MEDICAL CENTER | Age: 51
End: 2021-08-05
Attending: FAMILY MEDICINE
Payer: COMMERCIAL

## 2021-08-05 DIAGNOSIS — Z11.59 ENCOUNTER FOR SCREENING FOR OTHER VIRAL DISEASES: ICD-10-CM

## 2021-08-05 PROCEDURE — U0005 INFEC AGEN DETEC AMPLI PROBE: HCPCS

## 2021-08-05 PROCEDURE — U0003 INFECTIOUS AGENT DETECTION BY NUCLEIC ACID (DNA OR RNA); SEVERE ACUTE RESPIRATORY SYNDROME CORONAVIRUS 2 (SARS-COV-2) (CORONAVIRUS DISEASE [COVID-19]), AMPLIFIED PROBE TECHNIQUE, MAKING USE OF HIGH THROUGHPUT TECHNOLOGIES AS DESCRIBED BY CMS-2020-01-R: HCPCS

## 2021-08-05 PROCEDURE — C9803 HOPD COVID-19 SPEC COLLECT: HCPCS

## 2021-08-06 LAB
SARS-COV-2 RNA RESP QL NAA+PROBE: NOTDETECTED
SPECIMEN SOURCE: NORMAL

## 2021-08-12 ENCOUNTER — OFFICE VISIT (OUTPATIENT)
Dept: MEDICAL GROUP | Facility: PHYSICIAN GROUP | Age: 51
End: 2021-08-12
Payer: COMMERCIAL

## 2021-08-12 VITALS
HEART RATE: 67 BPM | BODY MASS INDEX: 27.08 KG/M2 | SYSTOLIC BLOOD PRESSURE: 102 MMHG | HEIGHT: 74 IN | WEIGHT: 211 LBS | DIASTOLIC BLOOD PRESSURE: 66 MMHG | OXYGEN SATURATION: 95 %

## 2021-08-12 DIAGNOSIS — R10.13 EPIGASTRIC PAIN: ICD-10-CM

## 2021-08-12 DIAGNOSIS — E11.9 TYPE 2 DIABETES MELLITUS WITHOUT COMPLICATION, WITHOUT LONG-TERM CURRENT USE OF INSULIN (HCC): Primary | ICD-10-CM

## 2021-08-12 LAB
HBA1C MFR BLD: 8.2 % (ref 0–5.6)
INT CON NEG: ABNORMAL
INT CON POS: ABNORMAL

## 2021-08-12 PROCEDURE — 83036 HEMOGLOBIN GLYCOSYLATED A1C: CPT | Performed by: FAMILY MEDICINE

## 2021-08-12 PROCEDURE — 99214 OFFICE O/P EST MOD 30 MIN: CPT | Performed by: FAMILY MEDICINE

## 2021-08-12 RX ORDER — EMPAGLIFLOZIN AND METFORMIN HYDROCHLORIDE 12.5; 1 MG/1; MG/1
1 TABLET ORAL
Qty: 180 TABLET | Refills: 6 | Status: SHIPPED | OUTPATIENT
Start: 2021-08-12 | End: 2022-08-15

## 2021-08-12 RX ORDER — FAMOTIDINE 20 MG/1
20 TABLET, FILM COATED ORAL 2 TIMES DAILY
Qty: 90 TABLET | Refills: 0 | Status: SHIPPED
Start: 2021-08-12 | End: 2021-11-18

## 2021-08-12 ASSESSMENT — FIBROSIS 4 INDEX: FIB4 SCORE: 1.29

## 2021-08-12 NOTE — ASSESSMENT & PLAN NOTE
This is a chronic condition.  Current medications:  Insulin: -  Biguanide: metformin 1000 mg bid   GLP1-RA: no - didn't tolerate Trulicity or Ozempic  SGLT-2i: empagliflozin 12.5 mg bid  DPP4-I: no - tried sitagliptin previously but no improvement  TZD: pioglitazone 45 mg daily  Esthela: -  Sulfonyluria: -    Last A1c: 8.2% (today)  Last Microalb/Cr ratio: normal (7/2021)  Fasting sugars: n/a  Last diabetic foot exam: today  Last retinal eye exam: 10/2020  ACEi/ARB: no - no microalbuminuria  Statin: rosuvastatin 10 mg  Aspirin: no  Concomitant HTN: no - at goal  Nightly foot checks: yes    Met with DM-RN today. Will transition from ER to IR of the synjardy to see if it works a little better. Additionally, we will check a c-peptide to be sure he is producing insulin as he continue to struggle with controlling his sugars.

## 2021-08-12 NOTE — ASSESSMENT & PLAN NOTE
This is a chronic condition. For a couple of years he has gotten intermittent chest pain. Never occurs when exercising, and he will do 40 mile bike rides. Sometimes the pain is stabbing and in the epigastrium.

## 2021-08-12 NOTE — PROGRESS NOTES
"RN-CDE Note    Subjective:     HPI:  José Bray is a 50 y.o. old patient who is seen today for review of his type 2 diabetes.     Diabetes Medications:   Synjardy 12.5/1000 mg daily  Pioglitazone 45 mg daily  Taking above medications as prescribed: yes  Taking daily ASA: No    Exercise: was more active a few weeks ago training, since then activity down due to weather and smoke.   Diet: \"healthy\" diet  in general   has been trying to decrease fast food.   Patient's body mass index is 27.09 kg/m². Exercise and nutrition counseling were performed at this visit.      Health Maintenance:   Health Maintenance Due   Topic Date Due   • A1C SCREENING  07/28/2021   • DIABETES MONOFILAMENT / LE EXAM  07/28/2021         DM:   Last A1c:   Lab Results   Component Value Date/Time    HBA1C 7.9 (A) 04/28/2021 08:20 AM      Previous A1c was 7.9 on 4/28/2021  A1C GOAL: < 7    Glucose monitoring frequency: not testing    Hypoglycemic episodes: no    Last Retinal Exam: on file and up-to-date, has next appointment scheduled for November.   Daily Foot Exam: Yes     Lab Results   Component Value Date/Time    MICROALBCALC 3.4 11/28/2016 08:13 AM    MALBCRT see below 07/20/2021 07:51 AM    MICROALBUR <1.2 07/20/2021 07:51 AM    MICRALB 9.0 11/28/2016 08:13 AM        ACR Albumin/Creatinine Ratio goal <30     HTN:   Blood pressure goal <140/<80 at goal.   Currently Rx ACE/ARB: No     Dyslipidemia:    Lab Results   Component Value Date/Time    CHOLSTRLTOT 136 07/20/2021 07:51 AM    LDL 67 07/20/2021 07:51 AM    HDL 46 07/20/2021 07:51 AM    TRIGLYCERIDE 116 07/20/2021 07:51 AM         Currently Rx Statin: Yes  Rosuvastatin 10 mg daily    He  reports that he quit smoking about 19 years ago. He smoked 0.00 packs per day. He quit smokeless tobacco use about 10 years ago.    Objective:     Exam:  Monofilament: done   Monofilament testing with a 10 gram force: sensation intact: intact bilaterally  Visual Inspection: Feet without " maceration, ulcers, fissures.  Pedal pulses: intact bilaterally    Plan:     Discussed and educated on:   - All medications, side effects and compliance (discussed carefully)  - Annual eye examinations at Ophthalmology  - Foot Care: what to look for when checking feet every day and when to contact HCP  - HbA1C: target  - Home glucose monitoring emphasized  - Weight control and daily exercise    Recommended medication changes: increase Synjardy 12.5/1000 bid.   Continue with Pioglitazone 45 mg per day

## 2021-08-13 NOTE — PROGRESS NOTES
Subjective:     CC: f/u DM    HPI:   José presents today with     Type 2 diabetes mellitus without complication  This is a chronic condition.  Current medications:  Insulin: -  Biguanide: metformin 1000 mg bid   GLP1-RA: no - didn't tolerate Trulicity or Ozempic  SGLT-2i: empagliflozin 12.5 mg bid  DPP4-I: no - tried sitagliptin previously but no improvement  TZD: pioglitazone 45 mg daily  Esthela: -  Sulfonyluria: -    Last A1c: 8.2% (today)  Last Microalb/Cr ratio: normal (7/2021)  Fasting sugars: n/a  Last diabetic foot exam: today  Last retinal eye exam: 10/2020  ACEi/ARB: no - no microalbuminuria  Statin: rosuvastatin 10 mg  Aspirin: no  Concomitant HTN: no - at goal  Nightly foot checks: yes    Met with DM-RN today. Will transition from ER to IR of the synjardy to see if it works a little better. Additionally, we will check a c-peptide to be sure he is producing insulin as he continue to struggle with controlling his sugars.      Epigastric pain  This is a chronic condition. For a couple of years he has gotten intermittent chest pain. Never occurs when exercising, and he will do 40 mile bike rides. Sometimes the pain is stabbing and in the epigastrium.       Current Outpatient Medications Ordered in Epic   Medication Sig Dispense Refill   • Empagliflozin-metFORMIN HCl (SYNJARDY) 12.5-1000 MG Tab Take 1 Tablet by mouth 2 times daily, before breakfast and dinner. 180 Tablet 6   • Blood Glucose Monitoring Suppl Device Meter: Dispense Device of Insurance Preference. Sig. Use as directed for blood sugar monitoring. #1. NR. 1 Each 0   • Blood Glucose Test Strips Test strips for preferred meter dispensed.  Testing blood sugars bid E11.65 200 Strip 1   • famotidine (PEPCID) 20 MG Tab Take 1 Tablet by mouth 2 times a day. 90 Tablet 0   • XARELTO 20 MG Tab tablet TAKE ONE TABLET BY MOUTH WITH DINNER 90 tablet 3   • pioglitazone (ACTOS) 45 MG Tab Take 1 tablet by mouth every day. 90 tablet 1   • rosuvastatin (CRESTOR) 10  "MG Tab Take 1 tablet by mouth every evening. 90 tablet 3   • Multiple Vitamins-Minerals (MULTIVITAMIN ADULTS 50+ PO)      • Omega-3 Fatty Acids (FISH OIL) 1000 MG Cap capsule Take 1,000 mg by mouth 3 times a day, with meals.     • Cholecalciferol (VITAMIN D3) 2000 units Tab Take  by mouth.       No current Epic-ordered facility-administered medications on file.       Health Maintenance: Completed    ROS:  Gen: no fevers/chills  Pulm: no sob      Objective:     Exam:  /66   Pulse 67   Ht 1.88 m (6' 2\")   Wt 95.7 kg (211 lb)   SpO2 95%   BMI 27.09 kg/m²  Body mass index is 27.09 kg/m².    Gen: Alert and oriented, No apparent distress.  Neck: Neck is supple without lymphadenopathy.  Lungs: Normal effort, CTA bilaterally, no wheezes, rhonchi, or rales  CV: Regular rate and rhythm. No murmurs, rubs, or gallops.  GI:  +BS, ND, +TTP in epigastrium, no masses, no hepatosplenomegaly  Ext: No clubbing, cyanosis, edema.      Assessment & Plan:     50 y.o. male with the following -     1. Type 2 diabetes mellitus without complication, without long-term current use of insulin (HCC)  This is a chronic condition, uncontrolled.  Hemoglobin A1c is over 7.0%.  He is up-to-date with all of his diabetic screenings, on an ACE inhibitor for renal protection, and is on a statin for cardiovascular protection.  We will get a change in from Synjardy extended release to immediate release.  We are also going to check a C-peptide to make sure he is producing insulin as I am concerned that he not been able to get it controlled with these medications despite him generally leading a very healthy lifestyle.    - POCT Hemoglobin A1C  - Diabetic Monofilament LE Exam  - Empagliflozin-metFORMIN HCl (SYNJARDY) 12.5-1000 MG Tab; Take 1 Tablet by mouth 2 times daily, before breakfast and dinner.  Dispense: 180 Tablet; Refill: 6  - Blood Glucose Monitoring Suppl Device; Meter: Dispense Device of Insurance Preference. Sig. Use as directed for " blood sugar monitoring. #1. NR.  Dispense: 1 Each; Refill: 0  - Blood Glucose Test Strips; Test strips for preferred meter dispensed.  Testing blood sugars bid E11.65  Dispense: 200 Strip; Refill: 1  - C-PEPTIDE; Future  -Continue pioglitazone 45 mg daily    2. Epigastric pain  This is a chronic condition, unchanged.  For couple of years has gotten intermittent episodes of chest pain.  Never gets any chest pain with exertion and this is a man who will sometimes do 40 mile bike rides.  Always occurs at rest.  Sometimes the pain is also in the epigastrium.  He is tender over the epigastrium today and I wonder if this chest pain is actually acid reflux.  -Famotidine 20 mg twice daily x6 weeks    Return in about 3 months (around 11/12/2021) for Diabetic RN + me, stomach pain.    Please note that this dictation was created using voice recognition software. I have made every reasonable attempt to correct obvious errors, but I expect that there are errors of grammar and possibly content that I did not discover before finalizing the note.

## 2021-09-05 ENCOUNTER — HEALTH MAINTENANCE LETTER (OUTPATIENT)
Age: 51
End: 2021-09-05

## 2021-10-06 ENCOUNTER — HOSPITAL ENCOUNTER (OUTPATIENT)
Dept: LAB | Facility: MEDICAL CENTER | Age: 51
End: 2021-10-06
Attending: FAMILY MEDICINE
Payer: COMMERCIAL

## 2021-10-06 DIAGNOSIS — E11.9 TYPE 2 DIABETES MELLITUS WITHOUT COMPLICATION, WITHOUT LONG-TERM CURRENT USE OF INSULIN (HCC): ICD-10-CM

## 2021-10-06 PROCEDURE — 84681 ASSAY OF C-PEPTIDE: CPT

## 2021-10-06 PROCEDURE — 36415 COLL VENOUS BLD VENIPUNCTURE: CPT

## 2021-10-08 LAB — C PEPTIDE SERPL-MCNC: 1.8 NG/ML (ref 0.5–3.3)

## 2021-11-02 RX ORDER — PIOGLITAZONEHYDROCHLORIDE 45 MG/1
45 TABLET ORAL DAILY
Qty: 90 TABLET | Refills: 0 | Status: SHIPPED | OUTPATIENT
Start: 2021-11-02 | End: 2021-11-18 | Stop reason: SDUPTHER

## 2021-11-18 ENCOUNTER — OFFICE VISIT (OUTPATIENT)
Dept: MEDICAL GROUP | Facility: PHYSICIAN GROUP | Age: 51
End: 2021-11-18
Payer: COMMERCIAL

## 2021-11-18 VITALS
HEIGHT: 74 IN | BODY MASS INDEX: 27.08 KG/M2 | SYSTOLIC BLOOD PRESSURE: 114 MMHG | DIASTOLIC BLOOD PRESSURE: 76 MMHG | WEIGHT: 211 LBS

## 2021-11-18 DIAGNOSIS — E11.9 TYPE 2 DIABETES MELLITUS WITHOUT COMPLICATION, WITHOUT LONG-TERM CURRENT USE OF INSULIN (HCC): Primary | ICD-10-CM

## 2021-11-18 DIAGNOSIS — R10.13 EPIGASTRIC PAIN: ICD-10-CM

## 2021-11-18 LAB
HBA1C MFR BLD: 7.7 % (ref 0–5.6)
INT CON NEG: ABNORMAL
INT CON POS: ABNORMAL

## 2021-11-18 PROCEDURE — 83036 HEMOGLOBIN GLYCOSYLATED A1C: CPT | Performed by: FAMILY MEDICINE

## 2021-11-18 PROCEDURE — 99214 OFFICE O/P EST MOD 30 MIN: CPT | Performed by: FAMILY MEDICINE

## 2021-11-18 RX ORDER — PIOGLITAZONEHYDROCHLORIDE 45 MG/1
45 TABLET ORAL DAILY
Qty: 90 TABLET | Refills: 2 | Status: SHIPPED | OUTPATIENT
Start: 2021-11-18 | End: 2022-08-25 | Stop reason: SDUPTHER

## 2021-11-18 ASSESSMENT — ENCOUNTER SYMPTOMS
FEVER: 0
DIARRHEA: 1
CHILLS: 1

## 2021-11-18 NOTE — PROGRESS NOTES
"Subjective:     CC: f/u DM, epigastric pain    HPI:   José presents today with    Problem   Epigastric Pain    Chronic.  At his last appointment he mentioned for a few years he been getting chest pain and epigastric pain.  I suspected it may be acid related so I recommended he use famotidine twice daily for 6 weeks.    He reports the therapy was minimally helpful in the beginning. Since completing the therapy, the pain has been hit and miss. Juice fasts intermittently and has noticed it more during those times.     Taking a multi, zinc, vit d3, tumeric, amlaberry, and fish oil.            Type 2 Diabetes Mellitus Without Complication (Hcc)    This is a chronic condition.  Current medications:  Insulin: -  Biguanide: metformin 1000 mg bid (max)  GLP1-RA: no - didn't tolerate Trulicity or Ozempic  SGLT-2i: empagliflozin 12.5 mg bid  DPP4-I: no - tried sitagliptin previously but no improvement  TZD: pioglitazone 45 mg daily  Esthela: -  Sulfonyluria: -    Last A1c: 7.7% (11/2021); 8.2% (8/2021)  Last Microalb/Cr ratio: normal (7/2021)  Fasting sugars: n/a  Last diabetic foot exam: 8/2021  Last retinal eye exam: 10/2020  ACEi/ARB: no - no microalbuminuria  Statin: rosuvastatin 10 mg  Aspirin: no  Concomitant HTN: no - at goal  Nightly foot checks: yes    Met with DM-RN today. Likes to snack at night. 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             Health Maintenance: Completed    ROS:  Review of Systems   Constitutional: Positive for chills. Negative for fever.   Gastrointestinal: Positive for diarrhea.        After certain foods   Genitourinary: Negative for dysuria.       Objective:     Exam:  /76   Ht 1.88 m (6' 2\")   Wt 95.7 kg (211 lb)   BMI 27.09 kg/m²  Body mass index is 27.09 kg/m².    Physical Exam  Constitutional:       Appearance: Normal appearance.   Cardiovascular:      Rate and Rhythm: Normal rate and regular " rhythm.      Heart sounds: Normal heart sounds.   Pulmonary:      Effort: Pulmonary effort is normal.      Breath sounds: Normal breath sounds.   Musculoskeletal:      Cervical back: Normal range of motion and neck supple.   Neurological:      Mental Status: He is alert.       Assessment & Plan:     51 y.o. male with the following -     Problem List Items Addressed This Visit     Type 2 diabetes mellitus without complication (HCC)     Chronic, uncontrolled.  Hemoglobin A1c is over 7.0%.  He is up-to-date with all of his diabetic screenings and is on a statin for cardiovascular protection.  He states that he is going to start training for an Iron[employer] so the decision was made today to not add more medication to avoid the potential risk of hypoglycemia if he does exercise as hard as he plans.  Therefore, we will continue the current regimen.  -Synjardy 12.5-1000 mg twice daily  -Pioglitazone 45 mg daily         Relevant Medications    pioglitazone (ACTOS) 45 MG Tab    Other Relevant Orders    POCT Hemoglobin A1C (Completed)    Epigastric pain     Chronic, stable.  He tried the famotidine twice daily which initially helped but then symptoms returned despite being on therapy.  He wonders if it is related to his supplements so he is decided he is going to stop the supplements except for the multivitamin and see if the stomach settles.  He is also noticing some potential GI side effects with dairy so he is also trying to limiting dairy.  -If no change in symptoms, try omeprazole             Return in about 3 months (around 2/18/2022) for epigastric pain, Diabetic RN + me.    Please note that this dictation was created using voice recognition software. I have made every reasonable attempt to correct obvious errors, but I expect that there are errors of grammar and possibly content that I did not discover before finalizing the note.

## 2021-11-18 NOTE — ASSESSMENT & PLAN NOTE
Chronic, stable.  He tried the famotidine twice daily which initially helped but then symptoms returned despite being on therapy.  He wonders if it is related to his supplements so he is decided he is going to stop the supplements except for the multivitamin and see if the stomach settles.  He is also noticing some potential GI side effects with dairy so he is also trying to limiting dairy.  -If no change in symptoms, try omeprazole

## 2021-11-18 NOTE — LETTER
Replaced by [facility]  Carolina Adkins M.D.  [address]  [place] [address]  Fax: [phone]   Authorization for Release/Disclosure of   Protected Health Information   Name: JOÉS BRAY : 1970 SSN: [id number]   Address: [address] [place] [address]  [place] [address] Phone:    [phone] (home) [phone] (work)   I authorize the entity listed below to release/disclose the PHI below to:   Replaced by [facility]/Carolina Adkins,    Provider or Entity Name:   Nichelle Jeffrey, OD   Address   City, State, Zip   Phone:      Fax:     Reason for request: continuity of care   Information to be released:    [  ] LAST COLONOSCOPY,  including any PATH REPORT and follow-up  [  ] LAST FIT/COLOGUARD RESULT [  ] LAST DEXA  [  ] LAST MAMMOGRAM  [  ] LAST PAP  [  ] LAST LABS [ xx ] RETINA EXAM REPORT  [  ] IMMUNIZATION RECORDS  [  ] Release all info      [  ] Check here and initial the line next to each item to release ALL health information INCLUDING  _____ Care and treatment for drug and / or alcohol abuse  _____ HIV testing, infection status, or AIDS  _____ Genetic Testing    DATES OF SERVICE OR TIME PERIOD TO BE DISCLOSED: _____________  I understand and acknowledge that:  * This Authorization may be revoked at any time by you in writing, except if your health information has already been used or disclosed.  * Your health information that will be used or disclosed as a result of you signing this authorization could be re-disclosed by the recipient. If this occurs, your re-disclosed health information may no longer be protected by State or Federal laws.  * You may refuse to sign this Authorization. Your refusal will not affect your ability to obtain treatment.  * This Authorization becomes effective upon signing and will  on (date) __________.      If no date is indicated, this Authorization will  one (1) year from the signature date.    Name: José Bray    Signature:  Continued medical  care   Date:     11/18/2021       PLEASE FAX REQUESTED RECORDS BACK TO: [phone]

## 2021-11-18 NOTE — ASSESSMENT & PLAN NOTE
Chronic, uncontrolled.  Hemoglobin A1c is over 7.0%.  He is up-to-date with all of his diabetic screenings and is on a statin for cardiovascular protection.  He states that he is going to start training for an Ironman so the decision was made today to not add more medication to avoid the potential risk of hypoglycemia if he does exercise as hard as he plans.  Therefore, we will continue the current regimen.  -Synjardy 12.5-1000 mg twice daily  -Pioglitazone 45 mg daily

## 2021-11-18 NOTE — PROGRESS NOTES
"RN-CDE Note    Subjective:     HPI:  José Bray is a 51 y.o. old patient who is seen by the Diabetes Nurse Secialist today for review of his type 2 diabetes.    Changes in Health: no changes.     Diabetes Medications:   Pioglitazone 45 mg daily  Synjardy 12.5/1000 mg bid  Taking above medications as prescribed: yes  Taking daily ASA: Not Indicated    Exercise: yoga 2-3 times a week, walking dogs about 15 minuets per day.  Diet: \"healthy\" diet  in general  Does snack on sweets occasionally in the evening.   Patient's body mass index is 27.09 kg/m². Exercise and nutrition counseling were performed at this visit.      Health Maintenance:   Health Maintenance Due   Topic Date Due   • COVID-19 Vaccine (3 - Booster for Moderna series) 10/14/2021   • RETINAL SCREENING  10/19/2021   • A1C SCREENING  11/12/2021         DM:   Last A1c:   Lab Results   Component Value Date/Time    HBA1C 7.7 (A) 11/18/2021 01:47 PM      Previous A1c was 8.2 on 8/12/2021  A1C GOAL: < 7    Glucose monitoring frequency: testing 1-2 times per day.  States blood sugars in the 130 range in the morning, tends to be higher in the evening.     Hypoglycemic episodes: no    Last Retinal Exam: completed on 11/17/2021, [facility] records [facility]  Daily Foot Exam: Yes     Exam:  Monofilament: current, due 8/12/2022    Lab Results   Component Value Date/Time    MICROALBCALC 3.4 11/28/2016 08:13 AM    MALBCRT see below 07/20/2021 07:51 AM    MICROALBUR <1.2 07/20/2021 07:51 AM    MICRALB 9.0 11/28/2016 08:13 AM        ACR Albumin/Creatinine Ratio goal <30     HTN:   Blood pressure goal <140/<80 at goal.   Currently Rx ACE/ARB: Not Indicatd     Dyslipidemia:    Lab Results   Component Value Date/Time    CHOLSTRLTOT 136 07/20/2021 07:51 AM    LDL 67 07/20/2021 07:51 AM    HDL 46 07/20/2021 07:51 AM    TRIGLYCERIDE 116 07/20/2021 07:51 AM         Currently Rx Statin: Yes     He  reports that he quit smoking about 20 years " ago. He smoked 0.00 packs per day. He quit smokeless tobacco use about 10 years ago.      Plan:     Discussed and educated on:   - All medications, side effects and compliance (discussed carefully)  - Annual eye examinations at Ophthalmology  - HbA1C: target  - Home glucose monitoring emphasized  - Weight control and daily exercise    Recommended medication changes: none at this time.

## 2022-01-01 NOTE — HPI: FULL BODY SKIN EXAMINATION
Low birth weight or  infant, 4252-5806 grams
What Is The Reason For Today's Visit?: Full Body Skin Examination
What Is The Reason For Today's Visit? (Being Monitored For X): concerning skin lesions on an annual basis

## 2022-01-27 DIAGNOSIS — E78.2 MIXED HYPERLIPIDEMIA: ICD-10-CM

## 2022-01-27 RX ORDER — ROSUVASTATIN CALCIUM 10 MG/1
TABLET, COATED ORAL
Qty: 90 TABLET | Refills: 3 | Status: SHIPPED | OUTPATIENT
Start: 2022-01-27 | End: 2023-01-16

## 2022-02-17 ENCOUNTER — OFFICE VISIT (OUTPATIENT)
Dept: MEDICAL GROUP | Facility: PHYSICIAN GROUP | Age: 52
End: 2022-02-17
Payer: COMMERCIAL

## 2022-02-17 VITALS
HEIGHT: 74 IN | RESPIRATION RATE: 16 BRPM | SYSTOLIC BLOOD PRESSURE: 112 MMHG | OXYGEN SATURATION: 94 % | WEIGHT: 214 LBS | DIASTOLIC BLOOD PRESSURE: 68 MMHG | BODY MASS INDEX: 27.46 KG/M2 | TEMPERATURE: 98.8 F | HEART RATE: 73 BPM

## 2022-02-17 DIAGNOSIS — E11.9 TYPE 2 DIABETES MELLITUS WITHOUT COMPLICATION, WITHOUT LONG-TERM CURRENT USE OF INSULIN (HCC): ICD-10-CM

## 2022-02-17 DIAGNOSIS — R10.13 EPIGASTRIC PAIN: ICD-10-CM

## 2022-02-17 LAB
HBA1C MFR BLD: 7.2 % (ref 0–5.6)
INT CON NEG: ABNORMAL
INT CON POS: ABNORMAL

## 2022-02-17 PROCEDURE — 83036 HEMOGLOBIN GLYCOSYLATED A1C: CPT | Performed by: FAMILY MEDICINE

## 2022-02-17 PROCEDURE — 99214 OFFICE O/P EST MOD 30 MIN: CPT | Performed by: FAMILY MEDICINE

## 2022-02-17 ASSESSMENT — ENCOUNTER SYMPTOMS: SHORTNESS OF BREATH: 0

## 2022-02-17 NOTE — PROGRESS NOTES
"RN-CDE Note    Subjective:     HPI:  José Bray is a 51 y.o. old patient who is seen by the Diabetes Nurse Specialist today for review of his type 2 diabetes.    Changes in Health: none    Diabetes Medications:   Pioglitazone 45 mg daily  Synjardy 12.5/1000 mg bid  Taking above medications as prescribed: yes  Taking daily ASA: Not Indicated    Exercise: exercising 3-5 times per week for about  minutes.   Diet: \"healthy\" diet  in general  Has cut back on sweets, only one donut in 2022  Patient's body mass index is 27.48 kg/m². Exercise and nutrition counseling were performed at this visit.      Health Maintenance:   Health Maintenance Due   Topic Date Due   • COVID-19 Vaccine (3 - Booster for Moderna series) 09/14/2021   • A1C SCREENING  02/18/2022         DM:   Last A1c:   Lab Results   Component Value Date/Time    HBA1C 7.2 (A) 02/17/2022 03:24 PM      Previous A1c was 7.7 on 11/18/21  A1C GOAL: < 7    Glucose monitoring frequency: testing on occasion      Last Retinal Exam: on file and up-to-date      Exam:  Monofilament: current, due 8/12/22    Lab Results   Component Value Date/Time    MICROALBCALC 3.4 11/28/2016 08:13 AM    MALBCRT see below 07/20/2021 07:51 AM    MICROALBUR <1.2 07/20/2021 07:51 AM    MICRALB 9.0 11/28/2016 08:13 AM        ACR Albumin/Creatinine Ratio goal <30     HTN:   Blood pressure goal <140/<80 at goal.   Currently Rx ACE/ARB: Not Indicated     Dyslipidemia:    Lab Results   Component Value Date/Time    CHOLSTRLTOT 136 07/20/2021 07:51 AM    LDL 67 07/20/2021 07:51 AM    HDL 46 07/20/2021 07:51 AM    TRIGLYCERIDE 116 07/20/2021 07:51 AM         Currently Rx Statin: Yes     He  reports that he quit smoking about 20 years ago. He smoked 0.00 packs per day. He quit smokeless tobacco use about 10 years ago.      Plan:     Discussed and educated on:   - All medications, side effects and compliance (discussed carefully)  - Annual eye examinations at Ophthalmology  - " Diabetic Meal Plan: appropriate CHO value for meals and plate method  - HbA1C: target  - Home glucose monitoring emphasized  - Weight control and daily exercise    Recommended medication changes: no changes

## 2022-02-17 NOTE — PROGRESS NOTES
"Subjective:     CC: f/u DM    HPI:   José presents today with    Problem   Epigastric Pain    Chronic. For a few years he has been getting chest pain and epigastric pain.  I suspected it may be acid related so I recommended he use famotidine twice daily for 6 weeks. However, helped initially but then came back.    At his last visit he said he would try to stop supplements and limit dairy to see if it helps.    He decreased his omega 3 fish oil which has helped a little. He didn't cut back on dairy.      Type 2 Diabetes Mellitus Without Complication (Hcc)    This is a chronic condition.  Current medications:  Insulin: -  Biguanide: metformin 1000 mg bid (max)  GLP1-RA: no - didn't tolerate Trulicity or Ozempic  SGLT-2i: empagliflozin 12.5 mg bid (max)  DPP4-I: no - tried sitagliptin previously but no improvement  TZD: pioglitazone 45 mg daily (max)  Esthela: -  Sulfonyluria: -    Last A1c: 7.2% (2/2022); 7.7% (11/2021); 8.2% (8/2021)  Last Microalb/Cr ratio: normal (7/2021)  Fasting sugars: n/a  Last diabetic foot exam: 8/2021  Last retinal eye exam: 11/2021 - no DM retinopathy  ACEi/ARB: no - no microalbuminuria  Statin: rosuvastatin 10 mg  Aspirin: no  Concomitant HTN: no - at goal  Nightly foot checks: yes             Health Maintenance: Completed    ROS:  Review of Systems   Respiratory: Negative for shortness of breath.    Cardiovascular: Negative for chest pain.       Objective:     Exam:  /68 (BP Location: Left arm, Patient Position: Sitting, BP Cuff Size: Adult)   Pulse 73   Temp 37.1 °C (98.8 °F) (Temporal)   Resp 16   Ht 1.88 m (6' 2\")   Wt 97.1 kg (214 lb)   SpO2 94%   BMI 27.48 kg/m²  Body mass index is 27.48 kg/m².    Physical Exam  Constitutional:       Appearance: Normal appearance.   Cardiovascular:      Rate and Rhythm: Normal rate and regular rhythm.      Heart sounds: Normal heart sounds.   Pulmonary:      Effort: Pulmonary effort is normal.      Breath sounds: Normal breath sounds. "   Musculoskeletal:      Cervical back: Normal range of motion and neck supple.   Neurological:      Mental Status: He is alert.       Assessment & Plan:     51 y.o. male with the following -     Problem List Items Addressed This Visit     Type 2 diabetes mellitus without complication (HCC)     Chronic, uncontrolled, improved.  Hemoglobin A1c is over 7.0%.  He is up-to-date with all of his diabetic screenings, not on an ACE inhibitor for renal protection due to no microalbuminuria, and is on a statin for cardiovascular protection.  We will continue the current regimen.  -Metformin 1000 mg twice daily  -Empagliflozin 12.5 mg twice daily  -Pioglitazone 45 mg daily         Relevant Orders    POCT Hemoglobin A1C (Completed)    Epigastric pain     Chronic, slightly improved.  He did not get improvement with famotidine but he finds that as he decreases the omega-3 fish oil supplement helps a little bit with the epigastric pain.  He was going to try cutting back on dairy but admits he has not done that.  He does note his stomach feels better if he skips dairy for few days or strongly encouraged to try to cut back on dairy.  He will continue to monitor.             Return in about 3 months (around 5/17/2022) for Diabetic RN + me, epigstric pain.    Please note that this dictation was created using voice recognition software. I have made every reasonable attempt to correct obvious errors, but I expect that there are errors of grammar and possibly content that I did not discover before finalizing the note.

## 2022-02-18 NOTE — ASSESSMENT & PLAN NOTE
Chronic, slightly improved.  He did not get improvement with famotidine but he finds that as he decreases the omega-3 fish oil supplement helps a little bit with the epigastric pain.  He was going to try cutting back on dairy but admits he has not done that.  He does note his stomach feels better if he skips dairy for few days or strongly encouraged to try to cut back on dairy.  He will continue to monitor.

## 2022-02-18 NOTE — ASSESSMENT & PLAN NOTE
Chronic, uncontrolled, improved.  Hemoglobin A1c is over 7.0%.  He is up-to-date with all of his diabetic screenings, not on an ACE inhibitor for renal protection due to no microalbuminuria, and is on a statin for cardiovascular protection.  We will continue the current regimen.  -Metformin 1000 mg twice daily  -Empagliflozin 12.5 mg twice daily  -Pioglitazone 45 mg daily

## 2022-02-20 ENCOUNTER — HEALTH MAINTENANCE LETTER (OUTPATIENT)
Age: 52
End: 2022-02-20

## 2022-04-17 ENCOUNTER — HEALTH MAINTENANCE LETTER (OUTPATIENT)
Age: 52
End: 2022-04-17

## 2022-04-25 NOTE — PROGRESS NOTES
Subjective:     CC: f/u DM    HPI:   José presents today to follow up DM.  He has had a mild sore throat for weeks and got past our screening.  He was seen with full PPE-and 95 mask, face shield, contact gown, and gloves.    Sore throat  This is a new condition. He has had mild sore throat for the last few weeks. No rhinorrhea, cough, no fevers/chills, no sob. +CP but unchanged and chronic. At work there was one person who had been exposed some of his COVID-19 positive.  He did self quarantine for 14 days and was eventually found to be COVID-19 negative.  There was one other coworker who was positive who snuck in the back door of a building but he states it was in a building that he never sets foot in.    Type 2 diabetes mellitus without complication  This is a chronic condition. He does admit to a bad diet and less exercising than usual.  Current medications: empagliflozin-metformin ER 12.5-1000 mg bid (max dose)  Last A1c: 8.3% (2020); 6.7% (3/2019)  Last Microalb/Cr ratio: normal (2019)  Last diabetic foot exam: 2019  Last retinal eye exam: 2019  ACEi/ARB: no - no microalbuminuria  Statin: rosuvastatin 10 mg  Aspirin: no  Concomitant HTN: no - at goal  Nightly foot checks: yes        Past Medical History:   Diagnosis Date   • Diabetes (HCC)    • Factor V deficiency (HCC)    • Hyperlipidemia        Social History     Tobacco Use   • Smoking status: Former Smoker     Packs/day: 0.00     Last attempt to quit: 11/3/2001     Years since quittin.4   • Smokeless tobacco: Former User     Quit date: 3/1/2011   Substance Use Topics   • Alcohol use: Not Currently     Alcohol/week: 0.0 oz     Comment: drinks non-alcoholic beer   • Drug use: Never       Current Outpatient Medications Ordered in Epic   Medication Sig Dispense Refill   • Empagliflozin-metFORMIN HCl ER 12.5-1000 MG TABLET SR 24 HR Take 1 Tab by mouth 2 times a day. 180 Tab 3   • rosuvastatin (CRESTOR) 10 MG Tab Take 1 Tab by mouth every evening.  "30 Tab 11   • Multiple Vitamins-Minerals (MULTIVITAMIN ADULTS 50+ PO)      • rivaroxaban (XARELTO) 20 MG Tab tablet Take 1 Tab by mouth with dinner. 90 Tab 3   • Omega-3 Fatty Acids (FISH OIL) 1000 MG Cap capsule Take 1,000 mg by mouth 3 times a day, with meals.     • Blood Glucose Test Strips One Touch Ultra Test Strips.  Test once daily and with symptoms of hypoglycemia.  Diagnosis: E11.9 100 Strip 3   • Blood Glucose Monitoring Suppl Device One Touch Ultra glucometer.  Test once daily and with symptoms of hypoglycemia.  Diagnosis: E11.9 1 Device 0   • Cholecalciferol (VITAMIN D3) 2000 units Tab Take  by mouth.       No current Epic-ordered facility-administered medications on file.        Allergies:  Yellow dye    Health Maintenance: Completed    ROS:  Gen: no fevers/chills  ENT: no rhinorrhea  Pulm: no sob, no cough    Objective:     Exam:  BP (!) 98/60 (BP Location: Right arm, Patient Position: Sitting, BP Cuff Size: Adult long)   Pulse 85   Temp 36.8 °C (98.2 °F) (Temporal)   Ht 1.88 m (6' 2.02\")   Wt 94.3 kg (208 lb)   SpO2 93%   BMI 26.69 kg/m²  Body mass index is 26.69 kg/m².    Gen: Alert and oriented, No apparent distress.  Neck: Neck is supple without lymphadenopathy.  Lungs: Normal effort, CTA bilaterally, no wheezes, rhonchi, or rales  CV: Regular rate and rhythm. No murmurs, rubs, or gallops.  Ext: No clubbing, cyanosis, edema.    Assessment & Plan:     49 y.o. male with the following -     1. Sore throat  This is an acute condition.  For last few weeks he has had a sore throat.  He has no other upper respiratory symptoms though unfortunately got past our screenings so I saw him with full PPE.  There was one coworker who have been exposed to COVID-19 who self quarantine for 14 days and was found to be COVID-19 negative.  Another coworker was positive and snuck in the back door of a building but he states it was at a building that he never sets foot and so likely exposure to COVID-19 seems " low.    2. Type 2 diabetes mellitus without complication, without long-term current use of insulin (HCC)  This is a chronic condition, acutely uncontrolled.  Labs a year ago showed an A1c of 6.7% on his current regimen.  Labs done a few days ago showed increased to 8.3%.  He notes that he was not eating a very healthy diet not exercising as much as he usually does.  Therefore, we will continue the current regimen and recheck in 3 months.  -Continue Synjardy ER 12.5- 1000 mg twice daily  -If continued A1c over 7% next visit we will add a medication to the regimen    Return in about 3 months (around 7/9/2020) for f/u DM, get A1c.    Please note that this dictation was created using voice recognition software. I have made every reasonable attempt to correct obvious errors, but I expect that there are errors of grammar and possibly content that I did not discover before finalizing the note.       PAST SURGICAL HISTORY:  H/O bariatric surgery Gastric Sleeve  2013    H/O  section TWINS  2015    History of esophagogastroduodenoscopy (EGD) 2021

## 2022-05-06 ENCOUNTER — OFFICE VISIT (OUTPATIENT)
Dept: MEDICAL GROUP | Facility: PHYSICIAN GROUP | Age: 52
End: 2022-05-06
Payer: COMMERCIAL

## 2022-05-06 ENCOUNTER — E-CONSULT (OUTPATIENT)
Dept: CARDIOLOGY | Facility: MEDICAL CENTER | Age: 52
End: 2022-05-06

## 2022-05-06 VITALS
OXYGEN SATURATION: 95 % | BODY MASS INDEX: 27.05 KG/M2 | WEIGHT: 210.8 LBS | SYSTOLIC BLOOD PRESSURE: 120 MMHG | TEMPERATURE: 98.2 F | HEART RATE: 76 BPM | DIASTOLIC BLOOD PRESSURE: 88 MMHG | HEIGHT: 74 IN | RESPIRATION RATE: 16 BRPM

## 2022-05-06 DIAGNOSIS — R07.9 CHEST PAIN, UNSPECIFIED TYPE: ICD-10-CM

## 2022-05-06 DIAGNOSIS — M77.02 MEDIAL EPICONDYLITIS OF BOTH ELBOWS: ICD-10-CM

## 2022-05-06 DIAGNOSIS — R42 LIGHTHEADEDNESS: ICD-10-CM

## 2022-05-06 DIAGNOSIS — R20.2 TINGLING: ICD-10-CM

## 2022-05-06 DIAGNOSIS — Z71.9 ENCOUNTER FOR CONSULTATION: ICD-10-CM

## 2022-05-06 DIAGNOSIS — M77.01 MEDIAL EPICONDYLITIS OF BOTH ELBOWS: ICD-10-CM

## 2022-05-06 PROBLEM — J02.9 SORE THROAT: Status: RESOLVED | Noted: 2020-04-09 | Resolved: 2022-05-06

## 2022-05-06 PROBLEM — M79.673 FOOT PAIN: Status: RESOLVED | Noted: 2021-04-28 | Resolved: 2022-05-06

## 2022-05-06 PROBLEM — R09.89 SINUS COMPLAINT: Status: RESOLVED | Noted: 2019-07-24 | Resolved: 2022-05-06

## 2022-05-06 PROBLEM — R00.0 FAST HEART BEAT: Status: RESOLVED | Noted: 2019-03-12 | Resolved: 2022-05-06

## 2022-05-06 PROCEDURE — 99214 OFFICE O/P EST MOD 30 MIN: CPT | Performed by: FAMILY MEDICINE

## 2022-05-06 ASSESSMENT — ENCOUNTER SYMPTOMS
VOMITING: 0
FEVER: 0
NAUSEA: 0
CHILLS: 0

## 2022-05-06 NOTE — PROGRESS NOTES
"Subjective:     CC: lightheadedness, tingling, elbows, chest pain    HPI:   José presents today with    Problem   Lightheadedness    Chronic  Feels lightheaded when standing up. Improves if he stands up slower.   Will also get tingling in head  Lasted for few to 30 seconds. Sunday it was the worst, after running.      Tingling    \"A long time\" - years  Scalp, location will change  Numbing, tingling, tearing sensation - not painful   Lasts for a couple minutes  Not always triggered when standing up quickly  Can occur at rest, currently has symptoms  He does have very tight neck muscles and are sore to the touch - years     Chest Pain    Onset: several years  Duration: couple minutes, intermittent  Timing: not with exertion, only with rest  Quality: pressure  Associated symptoms: no SOB, no diaphoresis  Aggravating factors: none  Alleviating factors: deep breaths  Radiation: none       Medial Epicondylitis of Both Elbows    Chronic. Bilateral elbow pain since at least 10/2018. He has been doing PT without any improvement.     Foot Pain (Resolved)   Sore Throat (Resolved)   Sinus Complaint (Resolved)   Fast Heart Beat (Resolved)       Health Maintenance: Completed    ROS:  Review of Systems   Constitutional: Negative for chills and fever.   Gastrointestinal: Negative for nausea and vomiting.       Objective:     Exam:  /88 (BP Location: Right arm, Patient Position: Standing, BP Cuff Size: Adult) Comment: lightheaded  Pulse 76   Temp 36.8 °C (98.2 °F) (Temporal)   Resp 16   Ht 1.88 m (6' 2\")   Wt 95.6 kg (210 lb 12.8 oz)   SpO2 95%   BMI 27.07 kg/m²  Body mass index is 27.07 kg/m².    Physical Exam  Constitutional:       Appearance: Normal appearance.   Cardiovascular:      Rate and Rhythm: Normal rate and regular rhythm.      Heart sounds: Normal heart sounds.   Pulmonary:      Effort: Pulmonary effort is normal.      Breath sounds: Normal breath sounds.   Musculoskeletal:      Cervical back: Normal " range of motion and neck supple.   Neurological:      Mental Status: He is alert.      Comments: Neuro: CN II-XII intact, strength 5/5 in all muscle groups, sensation intact bilaterally to light touch, reflexes 2+ bilaterally, coordination intact bilaterally, Romberg negative, pronator drift negative         Assessment & Plan:     51 y.o. male with the following -     1. Medial epicondylitis of both elbows  Chronic, uncontrolled. Since at least 10/2018, he's had bilateral medial epicondylitis. Has not improved with conservative measures or physical therapy. May need an injection.  - Referral to Orthopedics    2. Lightheadedness  Chronic, stable. For years he will feel lightheaded for a few to 30 seconds after standing up quickly.  Its not as bad as he stands up more slowly.  In office orthostatic blood pressures were supine 110/70 and 64, standing 112/78 and 94, standing 120/88 and 76.   He did have orthostatic intolerance while we did the testing and he does have a 30 point increase in his heart rate but it was not sustained as it did quickly drop back to 76 so we cannot diagnose him with POTS at this time but I am suspicious that the source of his symptoms.  His symptoms are likely orthostatic in nature.    3. Tingling  Chronic, unchanged.  For years he reports he has had intermittent episodes of tingling in the scalp.  There is also numbing and tearing sensation in the location will move.  He has not developed any other neurological symptoms, just the tingling in the scalp.  It is not always affiliated with his lightheadedness and can occur just randomly.  The location spans multiple dermatomes. Neuro exam was benign today. Since this has been going on for very long time we will get him to neurology for further evaluation.  - Referral to Neurology    4. Chest pain, unspecified type  5. Encounter for consultation  Chronic, stable.  For years she has had intermittent episodes of chest pressure that does not  radiate.  No associated shortness of breath or diaphoresis.  It does not occur with exertion and in fact he is able to run marathons and go mountain biking without any chest pressure sensation.  It would only occur at rest.  He does have a stress test from 2019, after symptoms started, that was normal.  We will send an E consult to cardiology to see if further work-up is required but based on his physical activity level I am less suspicious of a cardiac origin.  - E-Consult to Cardiology    Referral for genetic research was offered. Patient is already a participant.    I spent a total of 34 minutes with record review, exam, communication with the patient, and documentation of this encounter.    Return if symptoms worsen or fail to improve.    Please note that this dictation was created using voice recognition software. I have made every reasonable attempt to correct obvious errors, but I expect that there are errors of grammar and possibly content that I did not discover before finalizing the note.

## 2022-05-07 NOTE — PROGRESS NOTES
"I received a request for an E-consult on this patient regarding chest pain concerns at rest and question whether it is \"atypical\" or noncardiac CP.    I reviewed the documentation from the office visit from earlier today.  It states that the patient has brief episodes (minutes) of chronic (several years) intermittent nonexertional chest pressure with no shortness of breath or diaphoresis, alleviated by deep breathing and nonradiating. The note also mentions \"It does not occur with exertion and in fact he is able to run marathons and go mountain biking without any chest pressure sensation. \" His blood pressure was 120/88 in his right arm.  There are also concerns of chronic and stable orthostatic hypotension/lightheadedness.    The patient was seen in our cardiology clinic on 12/9/2019 for \" fast heart rate\".  He has CAD and family history of ASCVD per chart review.    He had a CT-cardiac score done on 5/9/2019 that showed:  Coronary calcification:  LMA - 0.0  LCX - 4.9  LAD - 67.2  RCA - 17.8  PDA - 0.0  Total Calcium Score: 90  Percentile: Calcium score is above the 75th percentile for the patient's age and sex.    NM cardiac stress test 4/30/2019:  Myocardial Perfusion    Report    NUCLEAR IMAGING INTERPRETATION    * Small sized, mild to moderate severity, non-reversible defect in the apex    and distal inferior wall.  SSS of 3. This is possibly artifact due to normal    wall motion in this area.    * No reversible ischemia.    * Normal left ventricular size, ejection fraction, and wall motion.    ECG INTERPRETATION    Negative stress ECG for ischemia.     Pertinent cardiovascular medications:  Rosuvastatin 10 mg  Synjardy for diabetes  Actos for diabetes  Rivaroxaban - chronic - for prior pulmonary embolism in the setting of factor V Leiden deficiency    Pertinent labs:  Hemoglobin A1c 7.2% from 2/17/2022 down from 7.7% 11/18/2021  Lipid panel from 7/20/2021 showed a total cholesterol of 136, triglyceride 116, " HDL 46 and LDL of 67, much improved compared to 5/16/2017 lipid panel    Assessment and Recommendations:  -His current 10-year ASCVD risk is 4.7% assuming LDL of 70, low risk category, with an optimal 10-year risk of 2.6%.  -Fortunately no exertional cardiovascular symptoms per chart review     However the patient has semi-controlled diabetes with elevated coronary calcium score above the 75th percentile in 2019 for his age and gender at that point, on moderate intensity statin.  He is a former smoker.  Has factor V Leiden deficiency on AC and family history of ASCVD.  Diabetes can mask typical signs of obstructive CAD symptoms therefore due to the persistence of chest pressure with the above-mentioned history, you can consider a coronary CTA for further delineation of his CAD that we know about; and that will show any concerning obstructive lesions; and accordingly can be referred for coronary angiogram / cardiology etc.  If the coronary CTA is concerning, I recommend increasing his rosuvastatin to 20 mg and starting him on low-dose metoprolol in the interim.  Given that the patient is active with no exertional symptoms, a treadmill echocardiogram might be less productive.    Hope the above answered your e-consult question.    Time Spent: 20 minutes were spent on reviewing patient's chart details including pertinent lab work and imaging and formulating the above assessment and plan to hopefully help answer the question addressed in the interprofessional consult.     Isaac Norris MD, MPH FAC  Interventional Cardiologist  [facility]   of Clinical Internal Medicine - [facility] [place]

## 2022-05-16 ENCOUNTER — APPOINTMENT (OUTPATIENT)
Dept: MEDICAL GROUP | Facility: PHYSICIAN GROUP | Age: 52
End: 2022-05-16
Payer: COMMERCIAL

## 2022-05-18 DIAGNOSIS — Z01.812 PRE-PROCEDURE LAB EXAM: ICD-10-CM

## 2022-05-18 NOTE — PROGRESS NOTES
Order placed for outpatient, non fasting creatinine blood draw to check kidney function within 30 days prior to contrast administration for coronary CTA if determined medically necessary by CT staff.     This CT study may be scheduled through [facility] Scheduling at , option 2.

## 2022-05-19 ENCOUNTER — OFFICE VISIT (OUTPATIENT)
Dept: MEDICAL GROUP | Facility: PHYSICIAN GROUP | Age: 52
End: 2022-05-19
Payer: COMMERCIAL

## 2022-05-19 VITALS
BODY MASS INDEX: 27.96 KG/M2 | SYSTOLIC BLOOD PRESSURE: 122 MMHG | HEIGHT: 73 IN | DIASTOLIC BLOOD PRESSURE: 76 MMHG | WEIGHT: 211 LBS

## 2022-05-19 DIAGNOSIS — R10.13 EPIGASTRIC PAIN: ICD-10-CM

## 2022-05-19 DIAGNOSIS — E11.9 TYPE 2 DIABETES MELLITUS WITHOUT COMPLICATION, WITHOUT LONG-TERM CURRENT USE OF INSULIN (HCC): Primary | ICD-10-CM

## 2022-05-19 LAB
HBA1C MFR BLD: 7.7 % (ref 0–5.6)
INT CON NEG: ABNORMAL
INT CON POS: ABNORMAL

## 2022-05-19 PROCEDURE — 99214 OFFICE O/P EST MOD 30 MIN: CPT | Performed by: FAMILY MEDICINE

## 2022-05-19 PROCEDURE — 83036 HEMOGLOBIN GLYCOSYLATED A1C: CPT | Performed by: FAMILY MEDICINE

## 2022-05-19 RX ORDER — GLIMEPIRIDE 1 MG/1
1 TABLET ORAL EVERY MORNING
Qty: 30 TABLET | Refills: 3 | Status: SHIPPED | OUTPATIENT
Start: 2022-05-19 | End: 2022-08-25 | Stop reason: SDUPTHER

## 2022-05-19 ASSESSMENT — ENCOUNTER SYMPTOMS
CHILLS: 0
NAUSEA: 0
VOMITING: 0
FEVER: 0

## 2022-05-19 NOTE — PROGRESS NOTES
"Subjective:     CC: DM, epigastric pain    HPI:   José presents today with    Problem   Epigastric Pain    Chronic. For a few years he has been getting chest pain and epigastric pain.  I suspected it may be acid related so I recommended he use famotidine twice daily for 6 weeks. However, helped initially but then came back.    He's stopped the fish oil. For the most part he has cut out dairy. The pain started when he started swimming. Pain hasn't resolved with these measures.      Type 2 Diabetes Mellitus Without Complication (Hcc)    This is a chronic condition.  Current medications:  Insulin: -  Biguanide: metformin 1000 mg bid (max)  GLP1-RA: no - didn't tolerate Trulicity or Ozempic  SGLT-2i: empagliflozin 12.5 mg bid (max)  DPP4-I: no - tried sitagliptin previously but no improvement  TZD: pioglitazone 45 mg daily (max)  Esthela: -  Sulfonyluria: -    Last A1c: 7.7% (5/2022); 7.2% (2/2022); 7.7% (11/2021); 8.2% (8/2021)  Last Microalb/Cr ratio: normal (7/2021)  Fasting sugars: n/a  Last diabetic foot exam: 8/2021  Last retinal eye exam: 11/2021 - no DM retinopathy  ACEi/ARB: no - no microalbuminuria  Statin: rosuvastatin 10 mg  Aspirin: no  Concomitant HTN: no - at goal  Nightly foot checks: yes             Health Maintenance: Completed    ROS:  Review of Systems   Constitutional: Negative for chills and fever.   Gastrointestinal: Negative for nausea and vomiting.     Objective:     Exam:  /76 (BP Location: Right arm, Patient Position: Sitting, BP Cuff Size: Adult)   Ht 1.854 m (6' 1\")   Wt 95.7 kg (211 lb)   BMI 27.84 kg/m²  Body mass index is 27.84 kg/m².    Physical Exam  Constitutional:       Appearance: Normal appearance.   Cardiovascular:      Rate and Rhythm: Normal rate and regular rhythm.      Heart sounds: Normal heart sounds.   Pulmonary:      Effort: Pulmonary effort is normal.      Breath sounds: Normal breath sounds.   Musculoskeletal:      Cervical back: Normal range of motion and neck " supple.   Neurological:      Mental Status: He is alert.         Assessment & Plan:     51 y.o. male with the following -     1. Type 2 diabetes mellitus without complication, without long-term current use of insulin (HCC)  Chronic, uncontrolled.  Hemoglobin A1c is over 7.0%.  He is up-to-date with all of his diabetic screenings and is on a statin for cardiovascular protection.  We will adjust the current regimen.  - metformin 100 mg twice daily  - empagliflozin 12.5 mg twice daily  - pioglitazone 45 mg daily  - start glimepiride 1 mg daily   - POCT Hemoglobin A1C  - glimepiride (AMARYL) 1 MG tablet; Take 1 Tablet by mouth every morning.  Dispense: 30 Tablet; Refill: 3    2. Epigastric pain  Chronic, unchanged.  For years she has been getting intermittent epigastric pain.  It did not respond to acid suppressive therapy.  Did not improve with stopping his fish oil supplement or cutting out dairy for the most part.  Therefore, we will go ahead and get an ultrasound to evaluate the abdomen.  If this is normal we could consider sports medicine referral.  He does note that the pain started when he started swimming so there could be a musculoskeletal component.  - US-ABDOMEN COMPLETE SURVEY; Future    Referral for genetic research was offered. Patient is already a participant.    Return in about 3 months (around 8/19/2022) for Diabetic RN + me.    Please note that this dictation was created using voice recognition software. I have made every reasonable attempt to correct obvious errors, but I expect that there are errors of grammar and possibly content that I did not discover before finalizing the note.

## 2022-05-19 NOTE — PROGRESS NOTES
RN-CDE Note    Subjective:     HPI:  José Bray is a 51 y.o. old patient who is seen by the Diabetes Nurse Specialist today for review of his type 2 diabetes.    Changes in Health: none    Diabetes Medications:   Pioglitazone 45 mg daily  Synjardy 12.5/1000 mg bid  Taking above medications as prescribed: yes  Taking daily ASA: Not Indicated    Exercise: swimming 3 times a week, training for [employer].   Diet: trying to eat healthier, still has occasional donut.   Patient's body mass index is 27.84 kg/m². Exercise and nutrition counseling were performed at this visit.      Health Maintenance:   Health Maintenance Due   Topic Date Due   • IMM PNEUMOCOCCAL VACCINE: 0-64 Years (2 - PCV) 12/04/2019   • COVID-19 Vaccine (3 - Booster for Moderna series) 09/14/2021   • A1C SCREENING  05/17/2022         DM:   Last A1c:   Lab Results   Component Value Date/Time    HBA1C 7.7 (A) 05/19/2022 03:06 PM      Previous A1c was 7.2 on 2/12/22  A1C GOAL: < 7    Glucose monitoring frequency: 1-2 times per day.  Fasting 150-160 range, evenings about 30 points lower    Hypoglycemic episodes: no    Last Retinal Exam: on file and up-to-date    Exam:  Monofilament: current, due 8/12/22    Lab Results   Component Value Date/Time    MICROALBCALC 3.4 11/28/2016 08:13 AM    MALBCRT see below 07/20/2021 07:51 AM    MICROALBUR <1.2 07/20/2021 07:51 AM    MICRALB 9.0 11/28/2016 08:13 AM        ACR Albumin/Creatinine Ratio goal <30     HTN:   Blood pressure goal <140/<80 at goal.   Currently Rx ACE/ARB: Not Indicated     Dyslipidemia:    Lab Results   Component Value Date/Time    CHOLSTRLTOT 136 07/20/2021 07:51 AM    LDL 67 07/20/2021 07:51 AM    HDL 46 07/20/2021 07:51 AM    TRIGLYCERIDE 116 07/20/2021 07:51 AM         Currently Rx Statin: Yes     He  reports that he quit smoking about 20 years ago. He smoked 0.00 packs per day. He quit smokeless tobacco use about 11 years ago.      Plan:     Discussed and educated on:   - All  medications, side effects and compliance (discussed carefully)  - Annual eye examinations at Ophthalmology  - HbA1C: target  - Home glucose monitoring emphasized  - Weight control and daily exercise    Recommended medication changes: try Glimepiride1 mg with  breakfast

## 2022-05-22 DIAGNOSIS — D68.51 FACTOR V LEIDEN (HCC): ICD-10-CM

## 2022-05-23 RX ORDER — RIVAROXABAN 20 MG/1
TABLET, FILM COATED ORAL
Qty: 90 TABLET | Refills: 3 | Status: SHIPPED | OUTPATIENT
Start: 2022-05-23 | End: 2023-05-11

## 2022-05-24 ENCOUNTER — HOSPITAL ENCOUNTER (OUTPATIENT)
Dept: LAB | Facility: MEDICAL CENTER | Age: 52
End: 2022-05-24
Attending: NURSE PRACTITIONER
Payer: COMMERCIAL

## 2022-05-24 DIAGNOSIS — Z01.812 PRE-PROCEDURE LAB EXAM: ICD-10-CM

## 2022-05-24 LAB
CREAT SERPL-MCNC: 0.94 MG/DL (ref 0.5–1.4)
GFR SERPLBLD CREATININE-BSD FMLA CKD-EPI: 97 ML/MIN/1.73 M 2

## 2022-05-24 PROCEDURE — 36415 COLL VENOUS BLD VENIPUNCTURE: CPT

## 2022-05-24 PROCEDURE — 82565 ASSAY OF CREATININE: CPT

## 2022-05-31 ENCOUNTER — TELEPHONE (OUTPATIENT)
Dept: MEDICAL GROUP | Facility: PHYSICIAN GROUP | Age: 52
End: 2022-05-31
Payer: COMMERCIAL

## 2022-05-31 NOTE — TELEPHONE ENCOUNTER
FINAL PRIOR AUTHORIZATION STATUS:    1.  Name of Medication & Dose: Glimepiride 1 mg      2. Prior Auth Status: Approved through From 05/30/2022 thru 05/30/2023     3. Action Taken: Pharmacy Notified: yes Patient Notified: yes    Documents faxed to Pharmacy, scanned into Media, and Pt notified by [employer].

## 2022-06-08 ENCOUNTER — APPOINTMENT (OUTPATIENT)
Dept: RADIOLOGY | Facility: MEDICAL CENTER | Age: 52
End: 2022-06-08
Attending: FAMILY MEDICINE
Payer: COMMERCIAL

## 2022-06-09 ENCOUNTER — APPOINTMENT (RX ONLY)
Dept: URBAN - METROPOLITAN AREA CLINIC 22 | Facility: CLINIC | Age: 52
Setting detail: DERMATOLOGY
End: 2022-06-09

## 2022-06-09 DIAGNOSIS — L56.8 OTHER SPECIFIED ACUTE SKIN CHANGES DUE TO ULTRAVIOLET RADIATION: ICD-10-CM

## 2022-06-09 DIAGNOSIS — L81.4 OTHER MELANIN HYPERPIGMENTATION: ICD-10-CM

## 2022-06-09 DIAGNOSIS — D18.0 HEMANGIOMA: ICD-10-CM

## 2022-06-09 DIAGNOSIS — D22 MELANOCYTIC NEVI: ICD-10-CM

## 2022-06-09 DIAGNOSIS — Z87.2 PERSONAL HISTORY OF DISEASES OF THE SKIN AND SUBCUTANEOUS TISSUE: ICD-10-CM

## 2022-06-09 DIAGNOSIS — Z71.89 OTHER SPECIFIED COUNSELING: ICD-10-CM

## 2022-06-09 PROBLEM — D18.01 HEMANGIOMA OF SKIN AND SUBCUTANEOUS TISSUE: Status: ACTIVE | Noted: 2022-06-09

## 2022-06-09 PROBLEM — D22.5 MELANOCYTIC NEVI OF TRUNK: Status: ACTIVE | Noted: 2022-06-09

## 2022-06-09 PROCEDURE — 99213 OFFICE O/P EST LOW 20 MIN: CPT

## 2022-06-09 PROCEDURE — ? SUNSCREEN RECOMMENDATIONS

## 2022-06-09 PROCEDURE — ? COUNSELING

## 2022-06-09 ASSESSMENT — LOCATION ZONE DERM
LOCATION ZONE: TRUNK
LOCATION ZONE: ARM

## 2022-06-09 ASSESSMENT — LOCATION SIMPLE DESCRIPTION DERM
LOCATION SIMPLE: ABDOMEN
LOCATION SIMPLE: LEFT UPPER BACK
LOCATION SIMPLE: LEFT SHOULDER

## 2022-06-09 ASSESSMENT — LOCATION DETAILED DESCRIPTION DERM
LOCATION DETAILED: LEFT SUPERIOR MEDIAL UPPER BACK
LOCATION DETAILED: EPIGASTRIC SKIN
LOCATION DETAILED: LEFT ANTERIOR SHOULDER

## 2022-06-09 NOTE — PROCEDURE: MIPS QUALITY
Quality 110: Preventive Care And Screening: Influenza Immunization: Influenza Immunization not Administered for Documented Reasons.
Quality 226: Preventive Care And Screening: Tobacco Use: Screening And Cessation Intervention: Patient screened for tobacco use and is an ex/non-smoker
Quality 111:Pneumonia Vaccination Status For Older Adults: Documentation of medical reason(s) for not administering pneumococcal vaccine (e.g., adverse reaction to vaccine)
Quality 130: Documentation Of Current Medications In The Medical Record: Current Medications Documented
Detail Level: Detailed

## 2022-06-14 NOTE — PROGRESS NOTES
"Subjective:     CC: f/u abdominal pain, possible sinus infection    HPI:   José presents today with abdominal pain and possible sinus infection.    Abdominal pain  This is a chronic condition.  For years he has had pain over the epigastrium that would radiate to the umbilicus and over the left upper quadrant.  He describes the pain as a \"phantom pain\" and stabbing.  No associated GI symptoms other than abdominal gas pain.  At last appointment I prescribed omeprazole for 2 months and recommended to use simethicone as needed for the abdominal gas pain. He is no longer getting the stabbing abdominal pain as much as he used to. He does get pain in his rectum (cramping) and radiates into the testicles. It will occur when he has the urge for a BM but also will occur with prolonged sitting. Relieved by defecation. No blood in your stool, no pain with defecation unless hard stool.    Sinus complaint  This is a new condition.  Onset: 2 days ago  Quality: temple headaches, myalgia, rhinorrhea, sore throat, cough, ear pressure/popping  Sick contacts: yes - wife, recently traveled to [place] and [place]  Home treatments: cassy-seltzer, menthol, antihistamine    Type 2 diabetes mellitus without complication  This is a chronic condition.  Current medications: empagliflozin-metformin 12.5-1000 mg bid  Last A1c: 6.7% (3/2019)  Last diabetic foot exam: today  Last retinal eye exam: 4/2019  ACEi/ARB: no  Statin: rosuvastatin  Aspirin: n/a  Concomitant HTN: at goal  Nightly foot checks: yes        Past Medical History:   Diagnosis Date   • Diabetes (HCC)    • Factor V deficiency (HCC)    • Hyperlipidemia        Social History   Substance Use Topics   • Smoking status: Former Smoker     Quit date: 11/3/2001   • Smokeless tobacco: Former User     Quit date: 3/1/2011   • Alcohol use No      Comment: drinks non-alcoholic beer       Current Outpatient Prescriptions Ordered in [facility]   Medication Sig Dispense Refill   • rosuvastatin " The catheter was repositioned into the right pulmonary artery. Hemodynamics were performed.  The patient's O2 saturation measured 63%.  "(CRESTOR) 5 MG Tab Take 1 Tab by mouth every evening. 90 Tab 3   • rivaroxaban (XARELTO) 20 MG Tab tablet Take 1 Tab by mouth with dinner. 90 Tab 3   • omeprazole (PRILOSEC) 20 MG delayed-release capsule Take 1 Cap by mouth every day. 60 Cap 0   • Empagliflozin-Metformin HCl ER 12.5-1000 MG TABLET SR 24 HR Take 1 Tab by mouth 2 times a day. 60 Tab 11   • Omega-3 Fatty Acids (FISH OIL) 1000 MG Cap capsule Take 1,000 mg by mouth 3 times a day, with meals.     • Blood Glucose Test Strips One Touch Ultra Test Strips.  Test once daily and with symptoms of hypoglycemia.  Diagnosis: E11.9 100 Strip 3   • Blood Glucose Monitoring Suppl Device One Touch Ultra glucometer.  Test once daily and with symptoms of hypoglycemia.  Diagnosis: E11.9 1 Device 0   • BENFOTIAMINE PO Take  by mouth.     • Cholecalciferol (VITAMIN D3) 2000 units Tab Take  by mouth.     • B Complex Vitamins (B COMPLEX PO) Take  by mouth.       No current Epic-ordered facility-administered medications on file.        Allergies:  Yellow dye    Health Maintenance: Completed    ROS:  Gen: + subjective fevers/chills  Eyes: no changes in vision  ENT: + sore throat  Pulm: no sob, + cough  CV: no chest pain  GI: no nausea/vomiting, no diarrhea      Objective:     Exam:  /72   Pulse 88   Temp 37.4 °C (99.3 °F)   Resp 16   Ht 1.88 m (6' 2\")   Wt 93.9 kg (207 lb)   SpO2 94%   BMI 26.58 kg/m²  Body mass index is 26.58 kg/m².    Gen: Alert and oriented, No apparent distress.  Neck: Neck is supple without lymphadenopathy.  Lungs: Normal effort, CTA bilaterally, no wheezes, rhonchi, or rales  CV: Regular rate and rhythm. No murmurs, rubs, or gallops.  Rectal: No anal fissures. Slight pain with pressing anteriorly on rectum. Prostate is enlarged but no nodules and not boggy or tender. No hemorrhoids palpated.  Ext: No clubbing, cyanosis, edema.  Diabetic foot exam: No lesions or calluses noted. 2+ pedal pulses. Sensation intact with 10 out of 10 on " "monofilament test.    A chaperone was offered to the patient during today's exam. Chaperone name: Baldemar Anne was present.    Assessment & Plan:     48 y.o. male with the following -     1. Left upper quadrant pain  This is a chronic condition, improved.  At his last appointment he discussed that he been having abdominal pain over the epigastrium that would radiate to the umbilicus and over the left upper quadrant for years.  He described as a \"phantom pain\" and stabbing.  No other associated GI symptoms other than abdominal gas pain.  I prescribed omeprazole for 2 months and he states that he is no longer getting that abdominal pain.  I also recommended simethicone for abdominal gas pain but he has not tried that yet.  -Return to clinic as needed    2. Rectal pain  In discussing the abdominal pain he mentioned that he is also been getting rectal pain over the last few years.  He gets intermittent episodes of pain in the rectum but then radiates to his testes.  This will occur when he gets the urge to have a bowel movement but can also occur with prolonged sitting sometimes.  Will be relieved by defecation.  He has denied any blood in his stool or pain with defecation was as hard stool.  Rectal exam today was benign except for an enlarged prostate and he does have some BPH symptoms.  He also was tender when I pressed anteriorly over the anal sphincter/rectum and wondering if that is triggering his pain. I am suspicious this may be proctalgia fugax.  However, this is a diagnosis of exclusion so we need to do work-up.  We need to rule out inflammatory bowel disease.  Chronic prostatitis can cause rectal pain but he only has an enlarged prostate.  There were no nodules, the prostate was nontender, and the prostate was not boggy so this seems less likely.  - CBC WITH DIFFERENTIAL; Future  - Basic Metabolic Panel; Future  - C-REACTIVE PROTEIN CARDIAC  - WESTERGREN SED RATE; Future  -If labs normal, consider GI referral " versus treating as if he has prostatitis    3. Sinus complaint  This is a new condition peer for last 2 days he has had symptoms of a viral upper respiratory infection.  He did recently fly to [place] in [place] and his wife was also sick with similar symptoms.  -Conservative management discussed    4. Type 2 diabetes mellitus without complication, without long-term current use of insulin (HCC)  This is a chronic condition, well-controlled.  His last hemoglobin A1c in March, 2019 was 6.7%.  Diabetic foot exam was performed today and normal.  He is up-to-date with his retinal eye exam though the report we got from the ophthalmologist says he has retinopathy but then it says he does not have retinopathy so I will discuss with the ophthalmologist for further confirmation.  He is not currently on an ACE inhibitor but he is on a statin.  - Diabetic Monofilament Lower Extremity Exam  - MICROALBUMIN CREAT RATIO URINE (LAB COLLECT); Future  -Discuss ACE inhibitor at next appointment    Return in about 2 months (around 9/24/2019) for f/u rectal pain, labs.    Please note that this dictation was created using voice recognition software. I have made every reasonable attempt to correct obvious errors, but I expect that there are errors of grammar and possibly content that I did not discover before finalizing the note.

## 2022-06-18 ENCOUNTER — OFFICE VISIT (OUTPATIENT)
Dept: URGENT CARE | Facility: CLINIC | Age: 52
End: 2022-06-18
Payer: COMMERCIAL

## 2022-06-18 VITALS
SYSTOLIC BLOOD PRESSURE: 112 MMHG | HEART RATE: 71 BPM | HEIGHT: 74 IN | TEMPERATURE: 98.3 F | BODY MASS INDEX: 27.21 KG/M2 | OXYGEN SATURATION: 96 % | WEIGHT: 212 LBS | DIASTOLIC BLOOD PRESSURE: 78 MMHG

## 2022-06-18 DIAGNOSIS — R10.9 CHRONIC ABDOMINAL PAIN: ICD-10-CM

## 2022-06-18 DIAGNOSIS — R19.7 DIARRHEA OF PRESUMED INFECTIOUS ORIGIN: ICD-10-CM

## 2022-06-18 DIAGNOSIS — G89.29 CHRONIC ABDOMINAL PAIN: ICD-10-CM

## 2022-06-18 PROCEDURE — 99213 OFFICE O/P EST LOW 20 MIN: CPT | Performed by: FAMILY MEDICINE

## 2022-06-18 NOTE — PROGRESS NOTES
"Subjective:     José Bray is a 51 y.o. male who presents for Diarrhea (X1 week ) and GI Problem (Stomach pain x1 week )    HPI  Pt presents for evaluation of an acute problem  Pt with chronic abd pain which is upper abdomen   Having some new lower abd pain the past few days   Having new diarrhea the past few days   Having mostly liquid stools   Having ~4 BM's per day   Eating some food, but feels that everything \"runs straight through\"   Having some nausea at times   No vomiting     Step son had diarrhea last week     Review of Systems   Constitutional: Negative for fever.   HENT: Negative for sore throat.    Respiratory: Negative for cough.    Gastrointestinal: Positive for abdominal pain and diarrhea.       PMH:  has a past medical history of Diabetes (HCC), Factor V deficiency (HCC), and Hyperlipidemia.  MEDS:   Current Outpatient Medications:   •  XARELTO 20 MG Tab tablet, TAKE ONE TABLET BY MOUTH EVERY DAY WITH DINNER, Disp: 90 Tablet, Rfl: 3  •  glimepiride (AMARYL) 1 MG tablet, Take 1 Tablet by mouth every morning., Disp: 30 Tablet, Rfl: 3  •  rosuvastatin (CRESTOR) 10 MG Tab, TAKE ONE TABLET BY MOUTH EVERY EVENING, Disp: 90 Tablet, Rfl: 3  •  pioglitazone (ACTOS) 45 MG Tab, Take 1 Tablet by mouth every day., Disp: 90 Tablet, Rfl: 2  •  Empagliflozin-metFORMIN HCl (SYNJARDY) 12.5-1000 MG Tab, Take 1 Tablet by mouth 2 times daily, before breakfast and dinner., Disp: 180 Tablet, Rfl: 6  •  Blood Glucose Monitoring Suppl Device, Meter: Dispense Device of Insurance Preference. Sig. Use as directed for blood sugar monitoring. #1. NR., Disp: 1 Each, Rfl: 0  •  Blood Glucose Test Strips, Test strips for preferred meter dispensed.  Testing blood sugars bid E11.65, Disp: 200 Strip, Rfl: 1  •  Multiple Vitamins-Minerals (MULTIVITAMIN ADULTS 50+ PO), , Disp: , Rfl:   •  Cholecalciferol (VITAMIN D3) 2000 units Tab, Take 4,000 Units by mouth., Disp: , Rfl:   ALLERGIES:   Allergies   Allergen Reactions   • " "Yellow Dye Hives and Diarrhea     Hives, diarrhea      SURGHX:   Past Surgical History:   Procedure Laterality Date   • TONSILLECTOMY       SOCHX:  reports that he quit smoking about 20 years ago. He smoked 0.00 packs per day. He quit smokeless tobacco use about 11 years ago. He reports previous alcohol use. He reports that he does not use drugs.     Objective:   /78 (BP Location: Left arm, Patient Position: Sitting, BP Cuff Size: Adult)   Pulse 71   Temp 36.8 °C (98.3 °F) (Temporal)   Ht 1.88 m (6' 2\")   Wt 96.2 kg (212 lb)   SpO2 96%   BMI 27.22 kg/m²     Physical Exam  Constitutional:       General: He is not in acute distress.     Appearance: He is well-developed. He is not diaphoretic.   Pulmonary:      Effort: Pulmonary effort is normal.   Abdominal:      General: Abdomen is flat. Bowel sounds are normal. There is no distension.      Palpations: Abdomen is soft.      Tenderness: There is no right CVA tenderness, left CVA tenderness, guarding or rebound.      Comments: +Mild TTP throughout abdomen    Neurological:      Mental Status: He is alert.       Assessment/Plan:   Assessment    1. Diarrhea of presumed infectious origin    2. Chronic abdominal pain    Patient with diarrhea and abdominal pain.  Has had abdominal pain somewhat chronically and has an ultrasound scheduled from PCP in 2 days.  Diarrhea is more new.  His stepson with same symptoms recently.  Suspect that he and his stepson both have viral gastroenteritis.  Suspect that acute viral gastroenteritis has exacerbated some of his long-term abdominal problems.  He has some mild abdominal tenderness throughout which does not localize.  No peritoneal signs, no sign of acute cholecystitis, acute appendicitis, diverticulitis, or other severe infections.  Advised patient on diet advancement and over-the-counter medications/probiotic use.  Reviewed follow-up precautions and will plan to have his ultrasound done in 2 days and follow-up with " PCP.

## 2022-06-20 ENCOUNTER — HOSPITAL ENCOUNTER (OUTPATIENT)
Dept: RADIOLOGY | Facility: MEDICAL CENTER | Age: 52
End: 2022-06-20
Attending: FAMILY MEDICINE
Payer: COMMERCIAL

## 2022-06-20 DIAGNOSIS — R10.13 EPIGASTRIC PAIN: ICD-10-CM

## 2022-06-20 PROCEDURE — 76700 US EXAM ABDOM COMPLETE: CPT

## 2022-06-20 ASSESSMENT — ENCOUNTER SYMPTOMS
SORE THROAT: 0
ABDOMINAL PAIN: 1
COUGH: 0
DIARRHEA: 1
FEVER: 0

## 2022-07-15 DIAGNOSIS — E11.9 TYPE 2 DIABETES MELLITUS WITHOUT COMPLICATION, WITHOUT LONG-TERM CURRENT USE OF INSULIN (HCC): ICD-10-CM

## 2022-08-04 ENCOUNTER — HOSPITAL ENCOUNTER (OUTPATIENT)
Dept: LAB | Facility: MEDICAL CENTER | Age: 52
End: 2022-08-04
Attending: FAMILY MEDICINE
Payer: COMMERCIAL

## 2022-08-05 ENCOUNTER — HOSPITAL ENCOUNTER (OUTPATIENT)
Dept: LAB | Facility: MEDICAL CENTER | Age: 52
End: 2022-08-05
Attending: FAMILY MEDICINE
Payer: COMMERCIAL

## 2022-08-05 DIAGNOSIS — E11.9 TYPE 2 DIABETES MELLITUS WITHOUT COMPLICATION, WITHOUT LONG-TERM CURRENT USE OF INSULIN (HCC): ICD-10-CM

## 2022-08-05 LAB
ALBUMIN SERPL BCP-MCNC: 4.5 G/DL (ref 3.2–4.9)
ALBUMIN/GLOB SERPL: 1.9 G/DL
ALP SERPL-CCNC: 62 U/L (ref 30–99)
ALT SERPL-CCNC: 23 U/L (ref 2–50)
ANION GAP SERPL CALC-SCNC: 11 MMOL/L (ref 7–16)
AST SERPL-CCNC: 30 U/L (ref 12–45)
BILIRUB SERPL-MCNC: 0.5 MG/DL (ref 0.1–1.5)
BUN SERPL-MCNC: 18 MG/DL (ref 8–22)
CALCIUM SERPL-MCNC: 9.8 MG/DL (ref 8.5–10.5)
CHLORIDE SERPL-SCNC: 104 MMOL/L (ref 96–112)
CHOLEST SERPL-MCNC: 138 MG/DL (ref 100–199)
CO2 SERPL-SCNC: 26 MMOL/L (ref 20–33)
CREAT SERPL-MCNC: 1.01 MG/DL (ref 0.5–1.4)
CREAT UR-MCNC: 97.18 MG/DL
FASTING STATUS PATIENT QL REPORTED: NORMAL
GFR SERPLBLD CREATININE-BSD FMLA CKD-EPI: 89 ML/MIN/1.73 M 2
GLOBULIN SER CALC-MCNC: 2.4 G/DL (ref 1.9–3.5)
GLUCOSE SERPL-MCNC: 106 MG/DL (ref 65–99)
HDLC SERPL-MCNC: 53 MG/DL
LDLC SERPL CALC-MCNC: 73 MG/DL
MICROALBUMIN UR-MCNC: <1.2 MG/DL
MICROALBUMIN/CREAT UR: NORMAL MG/G (ref 0–30)
POTASSIUM SERPL-SCNC: 4.2 MMOL/L (ref 3.6–5.5)
PROT SERPL-MCNC: 6.9 G/DL (ref 6–8.2)
SODIUM SERPL-SCNC: 141 MMOL/L (ref 135–145)
TRIGL SERPL-MCNC: 62 MG/DL (ref 0–149)

## 2022-08-05 PROCEDURE — 36415 COLL VENOUS BLD VENIPUNCTURE: CPT

## 2022-08-05 PROCEDURE — 82043 UR ALBUMIN QUANTITATIVE: CPT

## 2022-08-05 PROCEDURE — 80061 LIPID PANEL: CPT

## 2022-08-05 PROCEDURE — 82570 ASSAY OF URINE CREATININE: CPT

## 2022-08-05 PROCEDURE — 80053 COMPREHEN METABOLIC PANEL: CPT

## 2022-08-08 NOTE — PROGRESS NOTES
Anticoagulation Summary as of 6/26/2017     INR goal 2.0-3.0   Selected INR 2.7 (6/26/2017)   Maintenance plan 5 mg (5 mg x 1) on Mon, Fri; 7.5 mg (5 mg x 1.5) all other days   Weekly total 47.5 mg   Plan last modified Laura Calles, PHARMD (5/23/2017)   Next INR check 7/17/2017   Target end date Indefinite    Indications   Pulmonary embolism (CMS-[facility]) (Resolved) [I26.99]  Factor V Leiden (CMS-[facility]) [D68.51]         Anticoagulation Episode Summary     INR check location [facility]    Preferred lab     Send INR reminders to     Comments       Anticoagulation Care Providers     Provider Role Specialty Phone number    Edmundo Rao M.D. Referring Family Medicine [phone]    [facility] Responsible  [phone]    [facility] Ash Responsible Cardiology [phone]        Patient is therapeutic today. He just returned from a trip so his diet was off and he is taking Aleve for pain relief. Scheduled for a dental procedure today and INR needs to be < 3.0. No current symptoms of bleeding or thrombosis reported. Continue current regimen. Follow up in 3 weeks.    Next Appointment: Monday, July 17, 2017 at 8:15 am.     Leatha VELIZ           Statement Selected

## 2022-08-10 ENCOUNTER — HOSPITAL ENCOUNTER (OUTPATIENT)
Dept: RADIOLOGY | Facility: MEDICAL CENTER | Age: 52
End: 2022-08-10
Attending: FAMILY MEDICINE
Payer: COMMERCIAL

## 2022-08-10 DIAGNOSIS — E11.9 TYPE 2 DIABETES MELLITUS WITHOUT COMPLICATION, WITHOUT LONG-TERM CURRENT USE OF INSULIN (HCC): ICD-10-CM

## 2022-08-10 DIAGNOSIS — I25.10 CORONARY ARTERY CALCIFICATION: ICD-10-CM

## 2022-08-10 DIAGNOSIS — R07.9 CHEST PAIN, UNSPECIFIED TYPE: ICD-10-CM

## 2022-08-10 DIAGNOSIS — I25.84 CORONARY ARTERY CALCIFICATION: ICD-10-CM

## 2022-08-10 DIAGNOSIS — E78.2 MIXED HYPERLIPIDEMIA: ICD-10-CM

## 2022-08-10 PROCEDURE — 75574 CT ANGIO HRT W/3D IMAGE: CPT

## 2022-08-10 PROCEDURE — 700117 HCHG RX CONTRAST REV CODE 255: Performed by: FAMILY MEDICINE

## 2022-08-10 PROCEDURE — A9270 NON-COVERED ITEM OR SERVICE: HCPCS | Performed by: RADIOLOGY

## 2022-08-10 PROCEDURE — 700102 HCHG RX REV CODE 250 W/ 637 OVERRIDE(OP): Performed by: RADIOLOGY

## 2022-08-10 RX ORDER — NITROGLYCERIN 0.4 MG/1
0.4 TABLET SUBLINGUAL ONCE
Status: COMPLETED | OUTPATIENT
Start: 2022-08-10 | End: 2022-08-10

## 2022-08-10 RX ADMIN — IOHEXOL 60 ML: 350 INJECTION, SOLUTION INTRAVENOUS at 10:00

## 2022-08-10 RX ADMIN — NITROGLYCERIN 0.4 MG: 0.4 TABLET, ORALLY DISINTEGRATING SUBLINGUAL at 08:46

## 2022-08-10 NOTE — PROGRESS NOTES
Patient had CCTA.  Patient brought to preparation room.   Verbal consent given by patient for PIV and administration of Nitroglycerin by RN.  PIV initiated, 18G R AC    Review of medications, protocol, and NPO status completed.   Education provided to patient regarding examination.  Patient given baseline 3 lead EKG:   Patient ready for CT scan  Vitals: 0825  BP:103/70  HR: SR 75  RR: 16  93% RA    Administration of Sublingual Nitroglycerin given per CCTA protocol at 0846--See MAR  Vitals: 0846  BP: 113/65  HR: SR 66  RR: 14  92% RA    Vitals: 0850  BP:95/54  HR: SR 75  RR: 18  93% RA    Patient returned to preparation area where post procedure education given. PIV removed, patient provided with water.     Vitals returned to baseline. Patient states they are ready to go home. Discharge education provided. Patient given instructions to hold Synjardy (metformin) for 48 hours. Discharged per protocol.     Patient is ambulatory, escorted by RN to [facility].

## 2022-08-13 DIAGNOSIS — E11.9 TYPE 2 DIABETES MELLITUS WITHOUT COMPLICATION, WITHOUT LONG-TERM CURRENT USE OF INSULIN (HCC): ICD-10-CM

## 2022-08-15 RX ORDER — EMPAGLIFLOZIN AND METFORMIN HYDROCHLORIDE 12.5; 1 MG/1; MG/1
TABLET ORAL
Qty: 180 TABLET | Refills: 0 | Status: SHIPPED | OUTPATIENT
Start: 2022-08-15 | End: 2022-11-15

## 2022-08-25 ENCOUNTER — OFFICE VISIT (OUTPATIENT)
Dept: MEDICAL GROUP | Facility: PHYSICIAN GROUP | Age: 52
End: 2022-08-25
Payer: COMMERCIAL

## 2022-08-25 VITALS
DIASTOLIC BLOOD PRESSURE: 60 MMHG | WEIGHT: 199.8 LBS | SYSTOLIC BLOOD PRESSURE: 102 MMHG | HEART RATE: 80 BPM | TEMPERATURE: 97.3 F | HEIGHT: 74 IN | BODY MASS INDEX: 25.64 KG/M2 | OXYGEN SATURATION: 97 %

## 2022-08-25 DIAGNOSIS — E11.9 TYPE 2 DIABETES MELLITUS WITHOUT COMPLICATION, WITHOUT LONG-TERM CURRENT USE OF INSULIN (HCC): ICD-10-CM

## 2022-08-25 DIAGNOSIS — Z01.84 IMMUNITY STATUS TESTING: ICD-10-CM

## 2022-08-25 LAB
HBA1C MFR BLD: 6 % (ref 0–5.6)
INT CON NEG: ABNORMAL
INT CON POS: ABNORMAL

## 2022-08-25 PROCEDURE — 99213 OFFICE O/P EST LOW 20 MIN: CPT | Performed by: FAMILY MEDICINE

## 2022-08-25 PROCEDURE — 83036 HEMOGLOBIN GLYCOSYLATED A1C: CPT | Performed by: FAMILY MEDICINE

## 2022-08-25 RX ORDER — GLIMEPIRIDE 1 MG/1
1 TABLET ORAL EVERY MORNING
Qty: 90 TABLET | Refills: 3 | Status: SHIPPED | OUTPATIENT
Start: 2022-08-25 | End: 2023-10-09 | Stop reason: SDUPTHER

## 2022-08-25 RX ORDER — PIOGLITAZONEHYDROCHLORIDE 45 MG/1
45 TABLET ORAL DAILY
Qty: 90 TABLET | Refills: 2 | Status: SHIPPED | OUTPATIENT
Start: 2022-08-25 | End: 2023-07-27

## 2022-08-25 ASSESSMENT — ENCOUNTER SYMPTOMS
CHILLS: 0
FEVER: 0
SHORTNESS OF BREATH: 0

## 2022-08-25 NOTE — PROGRESS NOTES
"RN-CDE Note    Subjective:     HPI:  José Bray is a 51 y.o. old patient who is seen by the Diabetes Nurse Specialist today for review of his type 2 diabetes.    Changes in Health: none    Diabetes Medications:   Synjardy 12.5/1000 mg bid  Glimepiride 1 mg Qam  Pioglitazone 45 mg daily  Taking above medications as prescribed: yes  Taking daily ASA: Not Indicated    Exercise: active.   Diet: \"healthy\" diet  in general  Patient's body mass index is 25.65 kg/m². Exercise and nutrition counseling were performed at this visit.      Health Maintenance:   Health Maintenance Due   Topic Date Due    IMM PNEUMOCOCCAL VACCINE: 0-64 Years (2 - PCV) 12/04/2019    COVID-19 Vaccine (3 - Booster for Moderna series) 09/14/2021    DIABETES MONOFILAMENT / LE EXAM  08/12/2022    A1C SCREENING  08/19/2022         DM:   Last A1c:   Lab Results   Component Value Date/Time    HBA1C 6.0 (A) 08/25/2022 02:39 PM      Previous A1c was 7.7 on 5/19/22  A1C GOAL: < 7    Glucose monitoring frequency: testing 1-2 times per day    Hypoglycemic episodes: no    Last Retinal Exam: on file and up-to-date  Daily Foot Exam: Yes small sore on his left dorsal aspect of his foot from wearing brace while running.       Lab Results   Component Value Date/Time    MICROALBCALC 3.4 11/28/2016 08:13 AM    MALBCRT see below 08/05/2022 06:54 AM    MICROALBUR <1.2 08/05/2022 06:54 AM    MICRALB 9.0 11/28/2016 08:13 AM        ACR Albumin/Creatinine Ratio goal <30     HTN:   Blood pressure goal <140/<80 at goal.   Currently Rx ACE/ARB: No     Dyslipidemia:    Lab Results   Component Value Date/Time    CHOLSTRLTOT 138 08/05/2022 06:53 AM    LDL 73 08/05/2022 06:53 AM    HDL 53 08/05/2022 06:53 AM    TRIGLYCERIDE 62 08/05/2022 06:53 AM         Currently Rx Statin: Yes  Rosuvastatin 10 mg daily    He  reports that he quit smoking about 20 years ago. He quit smokeless tobacco use about 11 years ago.      Plan:     Discussed and educated on:   - All " medications, side effects and compliance (discussed carefully)  - Annual eye examinations at Ophthalmology  - Foot Care: what to look for when checking feet every day  - Home glucose monitoring emphasized  - Weight control and daily exercise    Recommended medication changes: none

## 2022-08-25 NOTE — PROGRESS NOTES
"Subjective:     CC: f/u DM    HPI:   José presents today with    Problem   Type 2 Diabetes Mellitus Without Complication (Hcc)    This is a chronic condition.  Current medications:  Insulin: -  Biguanide: metformin 1000 mg bid (max)  GLP1-RA: no - didn't tolerate Trulicity or Ozempic  SGLT-2i: empagliflozin 12.5 mg bid (max)  DPP4-I: no - tried sitagliptin previously but no improvement  TZD: pioglitazone 45 mg daily (max)  Esthela: -  Sulfonyluria: glimepiride 1 mg daily    Last A1c: 6.0% (8/2022); 7.7% (5/2022); 7.2% (2/2022); 7.7% (11/2021); 8.2% (8/2021)  Last Microalb/Cr ratio: normal (8/2022)  Fasting sugars: n/a  Last diabetic foot exam: today  Last retinal eye exam: 11/2021 - no DM retinopathy  ACEi/ARB: no - no microalbuminuria  Statin: rosuvastatin 10 mg  Aspirin: no  Concomitant HTN: no - at goal  Nightly foot checks: yes             Health Maintenance: Completed    ROS:  Review of Systems   Constitutional:  Negative for chills and fever.   Respiratory:  Negative for shortness of breath.    Cardiovascular:  Negative for chest pain.     Objective:     Exam:  /60 (BP Location: Left arm, Patient Position: Sitting, BP Cuff Size: Adult)   Ht 1.88 m (6' 2\")   Wt 90.6 kg (199 lb 12.8 oz)   BMI 25.65 kg/m²  Body mass index is 25.65 kg/m².    Physical Exam  Constitutional:       Appearance: Normal appearance.   Cardiovascular:      Rate and Rhythm: Normal rate and regular rhythm.      Heart sounds: Normal heart sounds.   Pulmonary:      Effort: Pulmonary effort is normal.      Breath sounds: Normal breath sounds.   Musculoskeletal:      Cervical back: Normal range of motion and neck supple.   Neurological:      Mental Status: He is alert.       Assessment & Plan:     51 y.o. male with the following -     1. Type 2 diabetes mellitus without complication, without long-term current use of insulin (HCC)  Chronic, controlled.  Hemoglobin A1c is under 7.0%.  He is up-to-date with all of his diabetic screenings " and is on a statin for cardiovascular protection.  We will continue the current regimen.  -Synjardy 12.5-1000 mg twice daily  - POCT Hemoglobin A1C  - glimepiride (AMARYL) 1 MG tablet; Take 1 Tablet by mouth every morning.  Dispense: 90 Tablet; Refill: 3  - Diabetic Monofilament Lower Extremity Exam    2. Immunity status testing  Needs Measles vaccine for [place] travel. Will check titers.  - RUBEOLA IGG AB; Future    Referral for genetic research was offered. Patient is already a participant.    Return in about 3 months (around 11/25/2022) for Diabetic RN + me.    Please note that this dictation was created using voice recognition software. I have made every reasonable attempt to correct obvious errors, but I expect that there are errors of grammar and possibly content that I did not discover before finalizing the note.

## 2022-08-25 NOTE — PATIENT INSTRUCTIONS
[id number] - pneumonia  Influenza  Hepatitis B  Hepatitis A  Typhoid vaccine  Yellow Fever - depends on the province in [place]

## 2022-10-12 ENCOUNTER — TELEPHONE (OUTPATIENT)
Dept: MEDICAL GROUP | Facility: PHYSICIAN GROUP | Age: 52
End: 2022-10-12
Payer: COMMERCIAL

## 2022-10-12 DIAGNOSIS — Z23 NEED FOR VACCINATION: ICD-10-CM

## 2022-10-12 NOTE — TELEPHONE ENCOUNTER
Patient is on the MA Schedule tomorrow for PCV 20 vaccine/injection.    SPECIFIC Action To Be Taken: Orders pending, please sign.

## 2022-10-13 ENCOUNTER — APPOINTMENT (OUTPATIENT)
Dept: MEDICAL GROUP | Facility: PHYSICIAN GROUP | Age: 52
End: 2022-10-13
Payer: COMMERCIAL

## 2022-10-23 ENCOUNTER — HEALTH MAINTENANCE LETTER (OUTPATIENT)
Age: 52
End: 2022-10-23

## 2022-10-25 ENCOUNTER — NON-PROVIDER VISIT (OUTPATIENT)
Dept: MEDICAL GROUP | Facility: PHYSICIAN GROUP | Age: 52
End: 2022-10-25
Payer: COMMERCIAL

## 2022-10-25 PROCEDURE — 90686 IIV4 VACC NO PRSV 0.5 ML IM: CPT

## 2022-10-25 PROCEDURE — 90677 PCV20 VACCINE IM: CPT

## 2022-10-25 PROCEDURE — 90472 IMMUNIZATION ADMIN EACH ADD: CPT

## 2022-10-25 PROCEDURE — 90471 IMMUNIZATION ADMIN: CPT

## 2022-10-25 NOTE — PROGRESS NOTES
"José Bray is a 52 y.o. male here for a non-provider visit for: FLU AND PNEUMO 20  FLU  PREVNAR 20 (PCV20)    Reason for immunization: Annual Flu Vaccine AND OVERDUE  Immunization records indicate need for vaccine: Yes, confirmed with Epic  Minimum interval has been met for this vaccine: Yes  ABN completed: Not Indicated    VIS Dated  08/06/021 AND 02/04/2022 was given to patient: Yes  All IAC Questionnaire questions were answered \"No.\"    Patient tolerated injection and no adverse effects were observed or reported: Yes    Pt scheduled for next dose in series: Not Indicated  "

## 2022-11-09 ENCOUNTER — TELEPHONE (OUTPATIENT)
Dept: NEUROLOGY | Facility: MEDICAL CENTER | Age: 52
End: 2022-11-09
Payer: COMMERCIAL

## 2022-11-15 ENCOUNTER — APPOINTMENT (OUTPATIENT)
Dept: NEUROLOGY | Facility: MEDICAL CENTER | Age: 52
End: 2022-11-15
Attending: PSYCHIATRY & NEUROLOGY
Payer: COMMERCIAL

## 2022-12-01 ENCOUNTER — HOSPITAL ENCOUNTER (OUTPATIENT)
Facility: MEDICAL CENTER | Age: 52
End: 2022-12-01
Attending: FAMILY MEDICINE
Payer: COMMERCIAL

## 2022-12-01 ENCOUNTER — OFFICE VISIT (OUTPATIENT)
Dept: MEDICAL GROUP | Facility: PHYSICIAN GROUP | Age: 52
End: 2022-12-01
Payer: COMMERCIAL

## 2022-12-01 VITALS
WEIGHT: 202 LBS | TEMPERATURE: 98.6 F | HEART RATE: 69 BPM | DIASTOLIC BLOOD PRESSURE: 66 MMHG | OXYGEN SATURATION: 94 % | HEIGHT: 74 IN | SYSTOLIC BLOOD PRESSURE: 104 MMHG | BODY MASS INDEX: 25.93 KG/M2

## 2022-12-01 DIAGNOSIS — J06.9 ACUTE URI: Primary | ICD-10-CM

## 2022-12-01 DIAGNOSIS — Z71.84 TRAVEL ADVICE ENCOUNTER: ICD-10-CM

## 2022-12-01 LAB
EXTERNAL QUALITY CONTROL: NORMAL
FLUAV+FLUBV AG SPEC QL IA: NEGATIVE
INT CON NEG: NORMAL
INT CON NEG: NORMAL
INT CON POS: NORMAL
INT CON POS: NORMAL
SARS-COV+SARS-COV-2 AG RESP QL IA.RAPID: NEGATIVE

## 2022-12-01 PROCEDURE — 87426 SARSCOV CORONAVIRUS AG IA: CPT | Performed by: FAMILY MEDICINE

## 2022-12-01 PROCEDURE — U0005 INFEC AGEN DETEC AMPLI PROBE: HCPCS

## 2022-12-01 PROCEDURE — 99213 OFFICE O/P EST LOW 20 MIN: CPT | Performed by: FAMILY MEDICINE

## 2022-12-01 PROCEDURE — U0003 INFECTIOUS AGENT DETECTION BY NUCLEIC ACID (DNA OR RNA); SEVERE ACUTE RESPIRATORY SYNDROME CORONAVIRUS 2 (SARS-COV-2) (CORONAVIRUS DISEASE [COVID-19]), AMPLIFIED PROBE TECHNIQUE, MAKING USE OF HIGH THROUGHPUT TECHNOLOGIES AS DESCRIBED BY CMS-2020-01-R: HCPCS

## 2022-12-01 PROCEDURE — 87804 INFLUENZA ASSAY W/OPTIC: CPT | Performed by: FAMILY MEDICINE

## 2022-12-01 RX ORDER — AZITHROMYCIN 500 MG/1
1000 TABLET, FILM COATED ORAL ONCE
Qty: 2 TABLET | Refills: 0 | Status: SHIPPED | OUTPATIENT
Start: 2022-12-01 | End: 2022-12-01

## 2022-12-01 RX ORDER — ACETAZOLAMIDE 125 MG/1
125 TABLET ORAL 2 TIMES DAILY
Qty: 28 TABLET | Refills: 0 | Status: SHIPPED | OUTPATIENT
Start: 2022-12-01 | End: 2022-12-15

## 2022-12-01 RX ORDER — AMOXICILLIN AND CLAVULANATE POTASSIUM 875; 125 MG/1; MG/1
1 TABLET, FILM COATED ORAL 2 TIMES DAILY
Qty: 10 TABLET | Refills: 0 | Status: SHIPPED | OUTPATIENT
Start: 2022-12-01 | End: 2022-12-06

## 2022-12-01 ASSESSMENT — PATIENT HEALTH QUESTIONNAIRE - PHQ9: CLINICAL INTERPRETATION OF PHQ2 SCORE: 0

## 2022-12-01 NOTE — PROGRESS NOTES
Chief Complaint   Patient presents with    Nasal Congestion     Symptoms started x 1 week        HPI: Patient is a 52 y.o. male complaining of 7 days of illness including: chills, diarrhea, facial pain, rhinorrhea, sore throat, headache .   Mucus is: cloudy.  Similarly ill exposures: yes.  Treatments tried: sinus rinses, nighttime cassy-seltzer cold/flu  He  reports that he quit smoking about 21 years ago. He quit smokeless tobacco use about 11 years ago.    No COVID boosters    Travel Meds  Going to [facility] - will try to summit a mountain 22,000 feet high. Plans to do a gradual ascent over 13 days.      ROS:  No fever, cough, nausea, changes in bowel movements or skin rash.      I reviewed the patient's medications, allergies and medical history:  Current Outpatient Medications   Medication Sig Dispense Refill    acetaZOLAMIDE (DIAMOX) 125 MG Tab Take 1 Tablet by mouth 2 times a day for 14 days. 28 Tablet 0    azithromycin (ZITHROMAX) 500 MG tablet Take 2 Tablets by mouth one time for 1 dose. 2 Tablet 0    amoxicillin-clavulanate (AUGMENTIN) 875-125 MG Tab Take 1 Tablet by mouth 2 times a day for 5 days. 10 Tablet 0    SYNJARDY 12.5-1000 MG Tab TAKE ONE TABLET BY MOUTH TWICE A DAY (BEFORE BREAKFAST AND DINNER) 180 Tablet 3    glimepiride (AMARYL) 1 MG tablet Take 1 Tablet by mouth every morning. 90 Tablet 3    pioglitazone (ACTOS) 45 MG Tab Take 1 Tablet by mouth every day. 90 Tablet 2    XARELTO 20 MG Tab tablet TAKE ONE TABLET BY MOUTH EVERY DAY WITH DINNER 90 Tablet 3    rosuvastatin (CRESTOR) 10 MG Tab TAKE ONE TABLET BY MOUTH EVERY EVENING 90 Tablet 3    Blood Glucose Monitoring Suppl Device Meter: Dispense Device of Insurance Preference. Sig. Use as directed for blood sugar monitoring. #1. NR. 1 Each 0    Blood Glucose Test Strips Test strips for preferred meter dispensed.  Testing blood sugars bid E11.65 200 Strip 1    Multiple Vitamins-Minerals (MULTIVITAMIN ADULTS 50+ PO)       Cholecalciferol (VITAMIN  "D3) 2000 units Tab Take 4,000 Units by mouth.       No current facility-administered medications for this visit.     Yellow dye  Past Medical History:   Diagnosis Date    Diabetes (HCC)     Factor V deficiency (HCC)     Hyperlipidemia         EXAM:  /66 (BP Location: Right arm, Patient Position: Sitting, BP Cuff Size: Adult)   Pulse 69   Temp 37 °C (98.6 °F) (Temporal)   Ht 1.88 m (6' 2\")   Wt 91.6 kg (202 lb)   SpO2 94%   General: Alert, no conversational dyspnea or audible wheeze, non-toxic appearance.  Eyes: PERRL, conjunctiva normal, no eye discharge.  Ears: Normal pinnae,TM's normal bilaterally.  Nares: Patent with thin mucus.  Sinuses: non tender over maxillary / frontal sinuses.  Throat: Erythematous injection without exudate.   Neck: Supple, with no adenopathy.  Lungs: Clear to auscultation bilaterally, no wheeze, crackles or rhonchi.   Heart: Regular rate without murmur.  Skin: Warm and dry without rash.    POC Testing  Flu A/B: neg  COVID: neg     ASSESSMENT:   1. Acute URI  PLAN:  1. Educated patient that majority of upper respiratory infections are viral and do not need antibiotics.  Since it has been a week and he plans to leave next week for a lengthy trip, we will go ahead and provide antibiotics.  He was informed that if he is not feeling better after 48 hours then it probably was viral.  2. COVID swab sent to lab for PCR confirmation  3. Twice daily use of nasal saline rinse or Neti-Pot.  4. OTC anti-pyretics and decongestants as needed.  5. Follow-up in office or urgent care for worsening symptoms, difficulty breathing, lack of expected recovery, or should new symptoms or problems arise.    2. Travel advice encounter  Next week she leaves for her trip to [facility].  He plans to ascend a 22,000 feet [place] over 13 days.  We will go ahead and give him Diamox for prevention of altitude sickness.  We will also provide a medication in case he develops traveler's diarrhea.      "

## 2022-12-02 DIAGNOSIS — J06.9 ACUTE URI: ICD-10-CM

## 2022-12-02 LAB
SARS-COV-2 RNA RESP QL NAA+PROBE: NOTDETECTED
SPECIMEN SOURCE: NORMAL

## 2023-01-05 ENCOUNTER — NON-PROVIDER VISIT (OUTPATIENT)
Dept: MEDICAL GROUP | Facility: PHYSICIAN GROUP | Age: 53
End: 2023-01-05
Payer: COMMERCIAL

## 2023-01-05 VITALS
WEIGHT: 206.35 LBS | DIASTOLIC BLOOD PRESSURE: 60 MMHG | SYSTOLIC BLOOD PRESSURE: 122 MMHG | HEIGHT: 74 IN | BODY MASS INDEX: 26.48 KG/M2

## 2023-01-05 DIAGNOSIS — E11.9 TYPE 2 DIABETES MELLITUS WITHOUT COMPLICATION, WITHOUT LONG-TERM CURRENT USE OF INSULIN (HCC): ICD-10-CM

## 2023-01-05 LAB
HBA1C MFR BLD: 6.9 % (ref 0–5.6)
INT CON NEG: ABNORMAL
INT CON POS: ABNORMAL

## 2023-01-05 PROCEDURE — 99211 OFF/OP EST MAY X REQ PHY/QHP: CPT | Performed by: FAMILY MEDICINE

## 2023-01-05 PROCEDURE — 83036 HEMOGLOBIN GLYCOSYLATED A1C: CPT | Performed by: FAMILY MEDICINE

## 2023-01-05 NOTE — LETTER
[facility]  Carolina Adkins M.D.  [address]  [place] [address]  Fax: [phone]   Authorization for Release/Disclosure of   Protected Health Information   Name: JOSÉ BRAY : 1970 SSN: [id number]   Address: [address] [place] [address]  [place] [address] Phone:    [phone] (home) [phone] (work)   I authorize the entity listed below to release/disclose the PHI below to:   [facility]/Carolina Adkins M.D. and [facility] RN   Provider or Entity Name:   Nichelle Wojciech    Address   City, State, Zip   Phone:      Fax:     Reason for request: continuity of care   Information to be released:    Retinal exam        [  ] Check here and initial the line next to each item to release ALL health information INCLUDING  _____ Care and treatment for drug and / or alcohol abuse  _____ HIV testing, infection status, or AIDS  _____ Genetic Testing    DATES OF SERVICE OR TIME PERIOD TO BE DISCLOSED: _____________  I understand and acknowledge that:  * This Authorization may be revoked at any time by you in writing, except if your health information has already been used or disclosed.  * Your health information that will be used or disclosed as a result of you signing this authorization could be re-disclosed by the recipient. If this occurs, your re-disclosed health information may no longer be protected by State or Federal laws.  * You may refuse to sign this Authorization. Your refusal will not affect your ability to obtain treatment.  * This Authorization becomes effective upon signing and will  on (date) __________.      If no date is indicated, this Authorization will  one (1) year from the signature date.    Name: José Bray    Signature:  Continued medical care   Date:     2023       PLEASE FAX REQUESTED RECORDS BACK TO: [phone]

## 2023-01-05 NOTE — PROGRESS NOTES
"RN-CDE Note    Subjective:     HPI:  José Bray is a 52 y.o. old patient who is seen by the Diabetes Nurse Specialist today for review of his controlled type 2 diabetes.   States he was in [place].     Diabetes Medications:   Synjardy 12.5/1000 mg bid  Glimepiride 1 mg q am  Pioglitazone 45 mg daily    Taking above medications as prescribed: yes  Taking daily ASA: Not Indicated    Exercise: very active  Diet: \"healthy\" diet  in general  Patient's body mass index is 26.49 kg/m². Exercise and nutrition counseling were performed at this visit.      Health Maintenance:   Health Maintenance Due   Topic Date Due    HEPATITIS C SCREENING  Never done    COVID-19 Vaccine (3 - Booster for Moderna series) 06/09/2021    RETINAL SCREENING  11/17/2022    A1C SCREENING  11/25/2022         DM:   Last A1c:   Lab Results   Component Value Date/Time    HBA1C 6.9 (A) 01/05/2023 03:37 PM      Previous A1c was 6 on 8/25/2022  A1C GOAL: < 7    Glucose monitoring frequency: has not been testing.     Hypoglycemic episodes: no    Last Retinal Exam:  states current as of October, request for records sent  Daily Foot Exam: Yes     Exam:  Monofilament: current    Lab Results   Component Value Date/Time    MICROALBCALC 3.4 11/28/2016 08:13 AM    MALBCRT see below 08/05/2022 06:54 AM    MICROALBUR <1.2 08/05/2022 06:54 AM    MICRALB 9.0 11/28/2016 08:13 AM        ACR Albumin/Creatinine Ratio goal <30     HTN:   Blood pressure goal <140/<80 .   Currently Rx ACE/ARB: Not Indicated     Dyslipidemia:    Lab Results   Component Value Date/Time    CHOLSTRLTOT 138 08/05/2022 06:53 AM    LDL 73 08/05/2022 06:53 AM    HDL 53 08/05/2022 06:53 AM    TRIGLYCERIDE 62 08/05/2022 06:53 AM         Currently Rx Statin: Yes  Rosuvastatin 10 mg daily    He  reports that he quit smoking about 21 years ago. He quit smokeless tobacco use about 11 years ago.      Plan:     Discussed and educated on:   - All medications, side effects " and compliance (discussed carefully)  - Annual eye examinations at Ophthalmology  - HbA1C: target  - Home glucose monitoring emphasized  - Weight control and daily exercise    Recommended medication changes: no changes.

## 2023-01-16 DIAGNOSIS — E78.2 MIXED HYPERLIPIDEMIA: ICD-10-CM

## 2023-01-16 RX ORDER — ROSUVASTATIN CALCIUM 10 MG/1
TABLET, COATED ORAL
Qty: 90 TABLET | Refills: 3 | Status: SHIPPED | OUTPATIENT
Start: 2023-01-16

## 2023-03-27 NOTE — ASSESSMENT & PLAN NOTE
The patient was referred for an Open Access Colonoscopy.  We have attempted to call the patient multiple times without success.   A letter has been mailed to the patient on 03/27/23.       If the office does not receive any contact from the patient, this request for an appointment will be removed from our workqueue.   The \"open access colonoscopy\" order will remain open for 1 year from the date it was entered.     This is a chronic condition.   Current medications: empagliflozin-metformin ER 12.5-1000 mg bid (max dose), sitagliptin 50 mg daily  Last A1c: 7.3% (today); 7.8% (7/2020)  Last Microalb/Cr ratio: normal (7/2020)  Last diabetic foot exam: 7/2020  Last retinal eye exam: recent - requesting records  ACEi/ARB: no - no microalbuminuria  Statin: rosuvastatin 10 mg  Aspirin: no  Concomitant HTN: no - at goal  Nightly foot checks: yes

## 2023-04-02 ENCOUNTER — HEALTH MAINTENANCE LETTER (OUTPATIENT)
Age: 53
End: 2023-04-02

## 2023-04-20 ENCOUNTER — OFFICE VISIT (OUTPATIENT)
Dept: MEDICAL GROUP | Facility: PHYSICIAN GROUP | Age: 53
End: 2023-04-20
Payer: COMMERCIAL

## 2023-04-20 VITALS
DIASTOLIC BLOOD PRESSURE: 64 MMHG | OXYGEN SATURATION: 95 % | BODY MASS INDEX: 26.56 KG/M2 | TEMPERATURE: 98.5 F | SYSTOLIC BLOOD PRESSURE: 100 MMHG | HEART RATE: 70 BPM | WEIGHT: 207 LBS | HEIGHT: 74 IN

## 2023-04-20 DIAGNOSIS — E11.9 TYPE 2 DIABETES MELLITUS WITHOUT COMPLICATION, WITHOUT LONG-TERM CURRENT USE OF INSULIN (HCC): ICD-10-CM

## 2023-04-20 DIAGNOSIS — Z71.84 TRAVEL ADVICE ENCOUNTER: Primary | ICD-10-CM

## 2023-04-20 PROCEDURE — 99213 OFFICE O/P EST LOW 20 MIN: CPT | Performed by: FAMILY MEDICINE

## 2023-04-20 RX ORDER — ATOVAQUONE AND PROGUANIL HYDROCHLORIDE 250; 100 MG/1; MG/1
1 TABLET, FILM COATED ORAL DAILY
Qty: 24 TABLET | Refills: 0 | Status: SHIPPED | OUTPATIENT
Start: 2023-04-20 | End: 2023-05-14

## 2023-04-20 RX ORDER — HEPATITIS A AND HEPATITIS B (RECOMBINANT) VACCINE 720; 20 [IU]/ML; UG/ML
1 INJECTION, SUSPENSION INTRAMUSCULAR ONCE
Qty: 1 ML | Refills: 2 | Status: SHIPPED
Start: 2023-04-20 | End: 2023-04-20

## 2023-04-20 ASSESSMENT — ENCOUNTER SYMPTOMS
SHORTNESS OF BREATH: 0
CHILLS: 0
FEVER: 0

## 2023-04-20 ASSESSMENT — PATIENT HEALTH QUESTIONNAIRE - PHQ9: CLINICAL INTERPRETATION OF PHQ2 SCORE: 0

## 2023-04-20 NOTE — PROGRESS NOTES
"Subjective:     CC: travel advice encounter    HPI:   José presents today with    Problem   Travel Advice Encounter    June 25th through July 8th  Going to [facility] in [facility]  Hep A & Hep B recommended  Malaria prophylaxis    He will be doing a Safari after climbing [facility]. So he should consider the Rabies vaccine. Both Typhoid and Yellow Fever are recommended/required.          Health Maintenance: Completed    ROS:  Review of Systems   Constitutional:  Negative for chills and fever.   Respiratory:  Negative for shortness of breath.    Cardiovascular:  Negative for chest pain.     Objective:     Exam:  /64 (BP Location: Right arm, Patient Position: Sitting, BP Cuff Size: Adult)   Pulse 70   Temp 36.9 °C (98.5 °F) (Temporal)   Ht 1.88 m (6' 2\")   Wt 93.9 kg (207 lb)   SpO2 95%   BMI 26.58 kg/m²  Body mass index is 26.58 kg/m².    Physical Exam  Constitutional:       Appearance: Normal appearance.   Cardiovascular:      Rate and Rhythm: Normal rate and regular rhythm.      Heart sounds: Normal heart sounds.   Pulmonary:      Effort: Pulmonary effort is normal.      Breath sounds: Normal breath sounds.   Musculoskeletal:      Cervical back: Normal range of motion and neck supple.   Neurological:      Mental Status: He is alert.             Assessment & Plan:     52 y.o. male with the following -     1. Travel advice encounter  He is planning on traveling to [facility] in [facility].  He  ELF has never been vaccinated against hepatitis A or hepatitis B.  Therefore, we are going to do the accelerated schedule with Twinrix.  He does need malaria prophylaxis and a prescription is provided.  [place] so he may need rabies and typhoid vaccines.  Additionally, he flies into [place] before connecting to [facility] in [place] is on the yellow fever high transmission list so he may need the yellow fever vaccine as well.  For these 3 vaccines have encouraged him to find a travel " clinic.  - hepatitis A-hepatitis B (TWINRIX) 720-20 ELU-MCG/ML injection; Inject 1 mL into the shoulder, thigh, or buttocks one time for 1 dose.  Dispense: 1 mL; Refill: 2  - atovaquone-proguanil (MALARONE) 250-100 MG per tablet; Take 1 Tablet by mouth every day for 24 days. Start on June 22, 2023 and continue until July 15, 2023.  Dispense: 24 Tablet; Refill: 0    2. Type 2 diabetes mellitus without complication, without long-term current use of insulin (HCC)  Chronic, status unknown.  Point-of-care A1c machine is broken in the office today.  Additionally our diabetic nurse had to call out so we will check an A1c and have him schedule appointment with me in the diabetic nurse to follow-up on the result.  For now he will continue his current regimen.  - Glimepiride 1 mg daily  - Pioglitazone 45 mg daily  - Synjardy 12.5 mg - 1000 mg twice daily  - HEMOGLOBIN A1C; Future    Referral for genetic research was offered. Patient is a participant.    Return for Diabetic RN + me.    Please note that this dictation was created using voice recognition software. I have made every reasonable attempt to correct obvious errors, but I expect that there are errors of grammar and possibly content that I did not discover before finalizing the note.

## 2023-05-11 DIAGNOSIS — D68.51 FACTOR V LEIDEN (HCC): ICD-10-CM

## 2023-05-11 PROCEDURE — 1125F AMNT PAIN NOTED PAIN PRSNT: CPT | Performed by: FAMILY MEDICINE

## 2023-05-11 RX ORDER — RIVAROXABAN 20 MG/1
TABLET, FILM COATED ORAL
Qty: 90 TABLET | Refills: 3 | Status: SHIPPED | OUTPATIENT
Start: 2023-05-11 | End: 2023-05-11 | Stop reason: SDUPTHER

## 2023-05-23 ENCOUNTER — HOSPITAL ENCOUNTER (OUTPATIENT)
Dept: LAB | Facility: MEDICAL CENTER | Age: 53
End: 2023-05-23
Attending: FAMILY MEDICINE
Payer: COMMERCIAL

## 2023-05-23 DIAGNOSIS — E11.9 TYPE 2 DIABETES MELLITUS WITHOUT COMPLICATION, WITHOUT LONG-TERM CURRENT USE OF INSULIN (HCC): ICD-10-CM

## 2023-05-23 LAB
EST. AVERAGE GLUCOSE BLD GHB EST-MCNC: 148 MG/DL
HBA1C MFR BLD: 6.8 % (ref 4–5.6)

## 2023-05-23 PROCEDURE — 36415 COLL VENOUS BLD VENIPUNCTURE: CPT

## 2023-05-23 PROCEDURE — 83036 HEMOGLOBIN GLYCOSYLATED A1C: CPT

## 2023-06-01 ENCOUNTER — HEALTH MAINTENANCE LETTER (OUTPATIENT)
Age: 53
End: 2023-06-01

## 2023-06-12 ENCOUNTER — APPOINTMENT (RX ONLY)
Dept: URBAN - METROPOLITAN AREA CLINIC 22 | Facility: CLINIC | Age: 53
Setting detail: DERMATOLOGY
End: 2023-06-12

## 2023-06-12 DIAGNOSIS — Z71.89 OTHER SPECIFIED COUNSELING: ICD-10-CM

## 2023-06-12 DIAGNOSIS — L81.4 OTHER MELANIN HYPERPIGMENTATION: ICD-10-CM

## 2023-06-12 DIAGNOSIS — Z87.2 PERSONAL HISTORY OF DISEASES OF THE SKIN AND SUBCUTANEOUS TISSUE: ICD-10-CM

## 2023-06-12 DIAGNOSIS — D22 MELANOCYTIC NEVI: ICD-10-CM

## 2023-06-12 DIAGNOSIS — D18.0 HEMANGIOMA: ICD-10-CM

## 2023-06-12 PROBLEM — D22.5 MELANOCYTIC NEVI OF TRUNK: Status: ACTIVE | Noted: 2023-06-12

## 2023-06-12 PROBLEM — D18.01 HEMANGIOMA OF SKIN AND SUBCUTANEOUS TISSUE: Status: ACTIVE | Noted: 2023-06-12

## 2023-06-12 PROCEDURE — ? COUNSELING

## 2023-06-12 PROCEDURE — ? SUNSCREEN RECOMMENDATIONS

## 2023-06-12 PROCEDURE — 99213 OFFICE O/P EST LOW 20 MIN: CPT

## 2023-06-12 PROCEDURE — ? DIAGNOSIS COMMENT

## 2023-06-12 ASSESSMENT — LOCATION DETAILED DESCRIPTION DERM
LOCATION DETAILED: LEFT SUPERIOR MEDIAL UPPER BACK
LOCATION DETAILED: LEFT ANTERIOR SHOULDER
LOCATION DETAILED: EPIGASTRIC SKIN

## 2023-06-12 ASSESSMENT — LOCATION ZONE DERM
LOCATION ZONE: TRUNK
LOCATION ZONE: ARM

## 2023-06-12 ASSESSMENT — LOCATION SIMPLE DESCRIPTION DERM
LOCATION SIMPLE: LEFT SHOULDER
LOCATION SIMPLE: LEFT UPPER BACK
LOCATION SIMPLE: ABDOMEN

## 2023-08-04 ENCOUNTER — APPOINTMENT (OUTPATIENT)
Dept: MEDICAL GROUP | Facility: PHYSICIAN GROUP | Age: 53
End: 2023-08-04
Payer: COMMERCIAL

## 2023-10-09 ENCOUNTER — HOSPITAL ENCOUNTER (OUTPATIENT)
Facility: MEDICAL CENTER | Age: 53
End: 2023-10-09
Attending: FAMILY MEDICINE
Payer: COMMERCIAL

## 2023-10-09 ENCOUNTER — OFFICE VISIT (OUTPATIENT)
Dept: MEDICAL GROUP | Facility: PHYSICIAN GROUP | Age: 53
End: 2023-10-09
Payer: COMMERCIAL

## 2023-10-09 VITALS
DIASTOLIC BLOOD PRESSURE: 68 MMHG | HEART RATE: 78 BPM | SYSTOLIC BLOOD PRESSURE: 106 MMHG | OXYGEN SATURATION: 96 % | WEIGHT: 205.8 LBS | BODY MASS INDEX: 26.41 KG/M2 | HEIGHT: 74 IN

## 2023-10-09 DIAGNOSIS — E11.9 TYPE 2 DIABETES MELLITUS WITHOUT COMPLICATION, WITHOUT LONG-TERM CURRENT USE OF INSULIN (HCC): ICD-10-CM

## 2023-10-09 DIAGNOSIS — Z11.59 NEED FOR HEPATITIS C SCREENING TEST: ICD-10-CM

## 2023-10-09 DIAGNOSIS — Z23 NEED FOR VACCINATION: ICD-10-CM

## 2023-10-09 DIAGNOSIS — E11.9 TYPE 2 DIABETES MELLITUS WITHOUT COMPLICATION, WITHOUT LONG-TERM CURRENT USE OF INSULIN (HCC): Primary | ICD-10-CM

## 2023-10-09 LAB
CREAT UR-MCNC: 76.68 MG/DL
HBA1C MFR BLD: 6.7 % (ref ?–5.8)
MICROALBUMIN UR-MCNC: <1.2 MG/DL
MICROALBUMIN/CREAT UR: NORMAL MG/G (ref 0–30)
POCT INT CON NEG: NEGATIVE
POCT INT CON POS: POSITIVE

## 2023-10-09 PROCEDURE — 3078F DIAST BP <80 MM HG: CPT | Performed by: FAMILY MEDICINE

## 2023-10-09 PROCEDURE — 90686 IIV4 VACC NO PRSV 0.5 ML IM: CPT | Performed by: FAMILY MEDICINE

## 2023-10-09 PROCEDURE — 90471 IMMUNIZATION ADMIN: CPT | Performed by: FAMILY MEDICINE

## 2023-10-09 PROCEDURE — 82043 UR ALBUMIN QUANTITATIVE: CPT

## 2023-10-09 PROCEDURE — 3074F SYST BP LT 130 MM HG: CPT | Performed by: FAMILY MEDICINE

## 2023-10-09 PROCEDURE — 82570 ASSAY OF URINE CREATININE: CPT

## 2023-10-09 PROCEDURE — 99214 OFFICE O/P EST MOD 30 MIN: CPT | Mod: 25 | Performed by: FAMILY MEDICINE

## 2023-10-09 PROCEDURE — 83036 HEMOGLOBIN GLYCOSYLATED A1C: CPT | Performed by: FAMILY MEDICINE

## 2023-10-09 RX ORDER — GLIMEPIRIDE 1 MG/1
1 TABLET ORAL EVERY MORNING
Qty: 90 TABLET | Refills: 3 | Status: SHIPPED | OUTPATIENT
Start: 2023-10-09

## 2023-10-09 RX ORDER — BLOOD-GLUCOSE SENSOR
1 EACH MISCELLANEOUS
Qty: 2 EACH | Refills: 6 | Status: SHIPPED | OUTPATIENT
Start: 2023-10-09

## 2023-10-09 ASSESSMENT — ENCOUNTER SYMPTOMS
CHILLS: 0
FEVER: 0
SHORTNESS OF BREATH: 0

## 2023-10-09 NOTE — PROGRESS NOTES
"RN-CDE Note    Subjective:     HPI:  José Bray is a 53 y.o. old patient who is seen by the Diabetes Nurse Specialist today for review of his controlled type 2 diabetes.    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 Exercise and nutrition counseling were performed at this visit.      Health Maintenance:   Health Maintenance Due   Topic Date Due    Hepatitis C Screening  Never done    COVID-19 Vaccine (3 - Moderna series) 06/09/2021    Fasting Lipid Profile  08/05/2023    Diabetes: Urine Protein Screening  08/05/2023    SERUM CREATININE  08/05/2023    A1c Screening  08/23/2023    Diabetes: Monofilament / LE Exam  08/25/2023    Influenza Vaccine (1) 09/01/2023         DM:   Last A1c:   Lab Results   Component Value Date/Time    HBA1C 6.7 (A) 10/09/2023 02:42 PM      Previous A1c was 6.8 on 5/23/23  A1C GOAL: < 7    Glucose monitoring frequency: on occasion.       Last Retinal Exam: on file and up-to-date  Daily Foot Exam: Yes     Exam:  done    Lab Results   Component Value Date/Time    MICROALBCALC 3.4 11/28/2016 08:13 AM    MALBCRT see below 08/05/2022 06:54 AM    MICROALBUR <1.2 08/05/2022 06:54 AM    MICRALB 9.0 11/28/2016 08:13 AM        ACR Albumin/Creatinine Ratio goal <30     HTN:   Blood pressure goal <130/<80 .   Currently Rx ACE/ARB: No     Dyslipidemia:    Lab Results   Component Value Date/Time    CHOLSTRLTOT 138 08/05/2022 06:53 AM    LDL 73 08/05/2022 06:53 AM    HDL 53 08/05/2022 06:53 AM    TRIGLYCERIDE 62 08/05/2022 06:53 AM         Currently Rx Statin: Yes     He  reports that he quit smoking about 21 years ago. He quit smokeless tobacco use about 12 years ago.      Plan:     Discussed and educated on:   - All medications, side effects and compliance (discussed carefully)  - Annual eye examinations " at Ophthalmology  - Foot Care:   - HbA1C: target  - Home glucose monitoring emphasized  - Weight control and daily exercise    Recommended medication changes: none.  Patient would like to try CGM

## 2023-10-09 NOTE — PROGRESS NOTES
"Subjective:     CC: DM    HPI:   José presents today with    Problem   Type 2 Diabetes Mellitus Without Complication (Hcc)    This is a chronic condition.  Current medications:  Insulin: -  Biguanide: metformin 1000 mg bid (max)  GLP1-RA: no - didn't tolerate Trulicity or Ozempic  SGLT-2i: empagliflozin 12.5 mg bid (max)  DPP4-I: no - tried sitagliptin previously but no improvement  TZD: pioglitazone 45 mg daily (max)  Esthela: -  Sulfonyluria: glimepiride 1 mg daily    Last A1c: 6.7% (10/2023); 6.8% (5/2023); 6.0% (8/2022); 7.7% (5/2022); 7.2% (2/2022); 7.7% (11/2021); 8.2% (8/2021)  Last Microalb/Cr ratio: normal (8/2022)  Fasting sugars: n/a  Last diabetic foot exam: 10/2023  Last retinal eye exam: 9/2023- no DM retinopathy  ACEi/ARB: no - no microalbuminuria  Statin: rosuvastatin 10 mg  Aspirin: no  Concomitant HTN: no - at goal  Nightly foot checks: yes             Health Maintenance: Completed    ROS:  Review of Systems   Constitutional:  Negative for chills and fever.   Respiratory:  Negative for shortness of breath.    Cardiovascular:  Negative for chest pain.       Objective:     Exam:  /68   Pulse 78   Ht 1.88 m (6' 2\")   Wt 93.4 kg (205 lb 12.8 oz)   SpO2 96%   BMI 26.42 kg/m²  Body mass index is 26.42 kg/m².    Physical Exam  Constitutional:       Appearance: Normal appearance.   Cardiovascular:      Rate and Rhythm: Normal rate and regular rhythm.      Heart sounds: Normal heart sounds.   Pulmonary:      Effort: Pulmonary effort is normal.      Breath sounds: Normal breath sounds.   Musculoskeletal:      Cervical back: Normal range of motion and neck supple.   Feet:      Comments: Diabetic foot exam: No lesions or calluses noted. 2+ pedal pulses. Sensation intact with 10 out of 10 on monofilament test.    Neurological:      Mental Status: He is alert.             Assessment & Plan:     53 y.o. male with the following -     1. Type 2 diabetes mellitus without complication, without long-term " current use of insulin (HCC)  Chronic, controlled.  Hemoglobin A1c is under 7.0%.  He is up-to-date with all diabetic screenings, on an ACE inhibitor for renal protection, and is on a statin for cardiovascular protection.  We will continue the current regimen.  - Pioglitazone 45 mg daily  - Synjardy 12.5-1000 mg twice daily  - glimepiride (AMARYL) 1 MG tablet; Take 1 Tablet by mouth every morning.  Dispense: 90 Tablet; Refill: 3  - POCT Hemoglobin A1C  - Continuous Blood Gluc Sensor (FREESTYLE MARLEE 3 SENSOR) Misc; 1 Each every 14 days.  Dispense: 2 Each; Refill: 6  - Diabetic Monofilament LE Exam  - Microalbumin Creat Ratio Urine - Clinic Collect; Future  - Comp Metabolic Panel; Future  - Lipid Profile; Future    2. Need for hepatitis C screening test  - HEP C VIRUS ANTIBODY; Future    3. Need for vaccination  - INFLUENZA VACCINE QUAD INJ (PF)    Referral for genetic research was offered. Patient is a participant.    Return in about 3 months (around 1/9/2024) for Diabetic RN + me.    Please note that this dictation was created using voice recognition software. I have made every reasonable attempt to correct obvious errors, but I expect that there are errors of grammar and possibly content that I did not discover before finalizing the note.

## 2023-11-10 ENCOUNTER — HOSPITAL ENCOUNTER (OUTPATIENT)
Dept: LAB | Facility: MEDICAL CENTER | Age: 53
End: 2023-11-10
Attending: FAMILY MEDICINE
Payer: COMMERCIAL

## 2023-11-10 DIAGNOSIS — E11.9 TYPE 2 DIABETES MELLITUS WITHOUT COMPLICATION, WITHOUT LONG-TERM CURRENT USE OF INSULIN (HCC): ICD-10-CM

## 2023-11-10 DIAGNOSIS — Z11.59 NEED FOR HEPATITIS C SCREENING TEST: ICD-10-CM

## 2023-11-10 LAB
ALBUMIN SERPL BCP-MCNC: 4.3 G/DL (ref 3.2–4.9)
ALBUMIN/GLOB SERPL: 1.7 G/DL
ALP SERPL-CCNC: 74 U/L (ref 30–99)
ALT SERPL-CCNC: 21 U/L (ref 2–50)
ANION GAP SERPL CALC-SCNC: 12 MMOL/L (ref 7–16)
AST SERPL-CCNC: 18 U/L (ref 12–45)
BILIRUB SERPL-MCNC: 0.5 MG/DL (ref 0.1–1.5)
BUN SERPL-MCNC: 18 MG/DL (ref 8–22)
CALCIUM ALBUM COR SERPL-MCNC: 8.9 MG/DL (ref 8.5–10.5)
CALCIUM SERPL-MCNC: 9.1 MG/DL (ref 8.5–10.5)
CHLORIDE SERPL-SCNC: 102 MMOL/L (ref 96–112)
CHOLEST SERPL-MCNC: 143 MG/DL (ref 100–199)
CO2 SERPL-SCNC: 24 MMOL/L (ref 20–33)
CREAT SERPL-MCNC: 1.01 MG/DL (ref 0.5–1.4)
FASTING STATUS PATIENT QL REPORTED: NORMAL
GFR SERPLBLD CREATININE-BSD FMLA CKD-EPI: 88 ML/MIN/1.73 M 2
GLOBULIN SER CALC-MCNC: 2.6 G/DL (ref 1.9–3.5)
GLUCOSE SERPL-MCNC: 224 MG/DL (ref 65–99)
HCV AB SER QL: NORMAL
HDLC SERPL-MCNC: 41 MG/DL
LDLC SERPL CALC-MCNC: 55 MG/DL
POTASSIUM SERPL-SCNC: 4.2 MMOL/L (ref 3.6–5.5)
PROT SERPL-MCNC: 6.9 G/DL (ref 6–8.2)
SODIUM SERPL-SCNC: 138 MMOL/L (ref 135–145)
TRIGL SERPL-MCNC: 236 MG/DL (ref 0–149)

## 2023-11-10 PROCEDURE — 80061 LIPID PANEL: CPT

## 2023-11-10 PROCEDURE — 86803 HEPATITIS C AB TEST: CPT

## 2023-11-10 PROCEDURE — 36415 COLL VENOUS BLD VENIPUNCTURE: CPT

## 2023-11-10 PROCEDURE — 80053 COMPREHEN METABOLIC PANEL: CPT

## 2024-01-14 ENCOUNTER — HEALTH MAINTENANCE LETTER (OUTPATIENT)
Age: 54
End: 2024-01-14

## 2024-02-15 DIAGNOSIS — E11.9 TYPE 2 DIABETES MELLITUS WITHOUT COMPLICATION, WITHOUT LONG-TERM CURRENT USE OF INSULIN (HCC): ICD-10-CM

## 2024-02-16 RX ORDER — EMPAGLIFLOZIN AND METFORMIN HYDROCHLORIDE 12.5; 1 MG/1; MG/1
TABLET ORAL
Qty: 180 TABLET | Refills: 1 | Status: SHIPPED | OUTPATIENT
Start: 2024-02-16

## 2024-02-26 ENCOUNTER — OFFICE VISIT (OUTPATIENT)
Dept: MEDICAL GROUP | Facility: PHYSICIAN GROUP | Age: 54
End: 2024-02-26
Payer: COMMERCIAL

## 2024-02-26 VITALS
BODY MASS INDEX: 27.34 KG/M2 | DIASTOLIC BLOOD PRESSURE: 64 MMHG | HEART RATE: 70 BPM | OXYGEN SATURATION: 94 % | HEIGHT: 74 IN | WEIGHT: 213 LBS | SYSTOLIC BLOOD PRESSURE: 118 MMHG | TEMPERATURE: 98.2 F

## 2024-02-26 DIAGNOSIS — R68.89 EAR SYMPTOM: ICD-10-CM

## 2024-02-26 DIAGNOSIS — E11.9 TYPE 2 DIABETES MELLITUS WITHOUT COMPLICATION, WITHOUT LONG-TERM CURRENT USE OF INSULIN (HCC): Primary | ICD-10-CM

## 2024-02-26 DIAGNOSIS — Z23 NEED FOR VACCINATION: ICD-10-CM

## 2024-02-26 DIAGNOSIS — M26.629 TMJ SYNDROME: ICD-10-CM

## 2024-02-26 LAB
HBA1C MFR BLD: 7.1 % (ref ?–5.8)
POCT INT CON NEG: NEGATIVE
POCT INT CON POS: POSITIVE

## 2024-02-26 PROCEDURE — 3074F SYST BP LT 130 MM HG: CPT | Performed by: FAMILY MEDICINE

## 2024-02-26 PROCEDURE — 90471 IMMUNIZATION ADMIN: CPT | Performed by: FAMILY MEDICINE

## 2024-02-26 PROCEDURE — 99214 OFFICE O/P EST MOD 30 MIN: CPT | Mod: 25 | Performed by: FAMILY MEDICINE

## 2024-02-26 PROCEDURE — 83036 HEMOGLOBIN GLYCOSYLATED A1C: CPT | Performed by: FAMILY MEDICINE

## 2024-02-26 PROCEDURE — 3078F DIAST BP <80 MM HG: CPT | Performed by: FAMILY MEDICINE

## 2024-02-26 PROCEDURE — 90636 HEP A/HEP B VACC ADULT IM: CPT | Performed by: FAMILY MEDICINE

## 2024-02-26 NOTE — PROGRESS NOTES
"RN-CDE Note    Subjective:     HPI:  José Bray is a 53 y.o. old patient who is seen by the Diabetes Nurse Specialist today for review of his controlled type 2 diabetes.    Changes in Health: denies changes.     Diabetes Medications:   Synjardy 12.5/1000 mg bid  Pioglitazone 45 mg daily  Glimepiride 1 mg daily  Taking above medications as prescribed: yes  Taking daily ASA: No    Exercise: very active  Diet: \"healthy\" diet  in general  Patient's body mass index is 27.35 kg/m². Exercise and nutrition counseling were performed at this visit.      Health Maintenance:   Health Maintenance Due   Topic Date Due    HIV Screening  Never done    Polio Vaccine (Inactivated Polio) (2 of 3 - Adult catch-up series) 07/07/2023    COVID-19 Vaccine (3 - 2023-24 season) 09/01/2023    A1c Screening  01/09/2024         DM:   Last A1c:   Lab Results   Component Value Date/Time    HBA1C 7.1 (A) 02/26/2024 03:41 PM      Previous A1c was 6.7 on 10/09/23  A1C GOAL: < 7    Glucose monitoring frequency: not testing     Hypoglycemic episodes: no    Last Retinal Exam: on file and up-to-date  Exam:  Monofilament: current.     Lab Results   Component Value Date/Time    MICROALBCALC 3.4 11/28/2016 08:13 AM    MALBCRT see below 10/09/2023 03:17 PM    MICROALBUR <1.2 10/09/2023 03:17 PM    MICRALB 9.0 11/28/2016 08:13 AM        ACR Albumin/Creatinine Ratio goal <30     HTN:   Blood pressure goal <130/<80 .   Currently Rx ACE/ARB: No     Dyslipidemia:    Lab Results   Component Value Date/Time    CHOLSTRLTOT 143 11/10/2023 02:16 PM    LDL 55 11/10/2023 02:16 PM    HDL 41 11/10/2023 02:16 PM    TRIGLYCERIDE 236 (H) 11/10/2023 02:16 PM         Currently Rx Statin:  Rosuvastatin 20 mg    He  reports that he quit smoking about 22 years ago. He quit smokeless tobacco use about 13 years ago.      Plan:     Discussed and educated on:   - All medications, side effects and compliance (discussed carefully)  - Annual eye examinations at " Ophthalmology  - HbA1C: target  - Home glucose monitoring emphasized  - Weight control and daily exercise    Recommended medication changes: no changes.

## 2024-02-27 NOTE — PROGRESS NOTES
Verbal consent was acquired by the patient to use [employer] ambient listening note generation during this visit     Subjective:     HPI:   History of Present Illness  The patient presents for evaluation of multiple medical concerns.    He has been under some stress at home and eating a little bit more snacky and junk foods recently. He has been in-laws at home and one of them has CMML. 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 He used to chew tobacco and it has been 13 years since he quit. He has nothing but food to fall on. He drinks coffee. He is training for triathlons. He has a triathlon  who tries to help him balance the carbs and proteins. He knows he needs energy with his training. He likes to do a bike workout on Mondays and Wednesdays in the evenings. He will take his medications before he gets on the bike. By the end of his bike workout, he feels like his blood sugars are going low. He will have a shot of Coca-Cola and then potatoes.    He had a cracked root in his tooth and had it extracted. He got a dry socket. He went back on the blood thinners and his blood clot did not hold back there. He had a lot of pain, but it has settled down now. His ear is still bothering him. He got ear drops, but he has not started them. He gets crackling and popping in his right ear. He puts plugs in his ear while swimming. At the end of the swim workout, he puts a cap on, he has pain in his ear.    He has TMJ. He is going to see his dentist tomorrow morning. His mouthguard is 5 years old. He denies any chest pain or trouble breathing in the last few days. He was lying in bed the other night and could not sleep. He had some heart palpitations. He ran 9 miles yesterday without any issues. He denies any fevers or chills in the last few days.   He has received 2 out of 3 hepatitis vaccines.    Health Maintenance: Completed    Objective:     Exam:  /64 (BP Location: Right arm, Patient Position:  "Sitting)   Ht 1.88 m (6' 2\")   Wt 96.6 kg (213 lb)   BMI 27.35 kg/m²  Body mass index is 27.35 kg/m².    Physical Exam  Constitutional:       Appearance: Normal appearance.   HENT:      Right Ear: Tympanic membrane, ear canal and external ear normal.      Left Ear: Tympanic membrane, ear canal and external ear normal.   Cardiovascular:      Rate and Rhythm: Normal rate and regular rhythm.      Heart sounds: Normal heart sounds.   Pulmonary:      Effort: Pulmonary effort is normal.      Breath sounds: Normal breath sounds.   Musculoskeletal:      Cervical back: Normal range of motion and neck supple.   Lymphadenopathy:      Cervical: No cervical adenopathy.   Neurological:      Mental Status: He is alert.           Results  Laboratory Studies  Labs were reviewed with the patient.    Assessment & Plan:     1. Type 2 diabetes mellitus without complication, without long-term current use of insulin ([facility])  POCT Hemoglobin A1C    Referral to Nutrition Services          Assessment & Plan  1. Type 2 diabetes without complication.  This is chronic and slightly uncontrolled. Hemoglobin A1c has increased to 7.1 percent. He admits that he has not been eating as healthy as he could due to some stress at home and feeling like he could get away with unhealthy eating because he has been training for triathlons. Therefore, for now, we will continue his current regimen and reevaluate his diabetes in 3 months. He will continue the glimepiride 1 mg every morning, the pioglitazone 45 mg every day, and Synjardy 12.5-1000 mg twice daily.    2. Ear symptoms.  This is acute. He has been noticing some crackling and popping in the right ear. He did recently have a cracked root and a tooth and had the tooth pulled and reports that he developed dry socket. Ear is completely normal on exam today, so most likely the symptoms he is getting are referred from the tooth problem. He will follow up with his dentist as directed.    3. TMJ " syndrome.  This is chronic and progressing. He does have a mouthguard, but he admits that it is about 5 years old now. He is starting to get symptoms again. He will follow up with his dentist.    4. Healthcare maintenance.  The last dose of the Twinrix vaccine was given today.      Referral for genetic research was offered. Patient is a participant.      Please note that this dictation was created using voice recognition software. I have made every reasonable attempt to correct obvious errors, but I expect that there are errors of grammar and possibly content that I did not discover before finalizing the note.

## 2024-03-28 DIAGNOSIS — E78.2 MIXED HYPERLIPIDEMIA: ICD-10-CM

## 2024-03-28 RX ORDER — ROSUVASTATIN CALCIUM 10 MG/1
10 TABLET, COATED ORAL EVERY EVENING
Qty: 90 TABLET | Refills: 3 | Status: SHIPPED | OUTPATIENT
Start: 2024-03-28

## 2024-03-28 NOTE — TELEPHONE ENCOUNTER
Received request via: Pharmacy    Was the patient seen in the last year in this department? Yes    Does the patient have an active prescription (recently filled or refills available) for medication(s) requested? No    [email or web address]    Does the patient have [facility] and need 100 day supply (blood pressure, diabetes and cholesterol meds only)? Patient does not have SCP

## 2024-03-30 DIAGNOSIS — D68.51 FACTOR V LEIDEN (HCC): ICD-10-CM

## 2024-04-01 RX ORDER — RIVAROXABAN 20 MG/1
20 TABLET, FILM COATED ORAL
Qty: 90 TABLET | Refills: 3 | Status: SHIPPED | OUTPATIENT
Start: 2024-04-01

## 2024-04-01 NOTE — TELEPHONE ENCOUNTER
Received request via: Pharmacy    Was the patient seen in the last year in this department? Yes    Does the patient have an active prescription (recently filled or refills available) for medication(s) requested? No    [id number]    Does the patient have [facility] and need 100 day supply (blood pressure, diabetes and cholesterol meds only)? Patient does not have SCP

## 2024-06-10 ENCOUNTER — OFFICE VISIT (OUTPATIENT)
Dept: MEDICAL GROUP | Facility: PHYSICIAN GROUP | Age: 54
End: 2024-06-10
Payer: COMMERCIAL

## 2024-06-10 VITALS
HEART RATE: 75 BPM | HEIGHT: 74 IN | DIASTOLIC BLOOD PRESSURE: 60 MMHG | TEMPERATURE: 99 F | SYSTOLIC BLOOD PRESSURE: 92 MMHG | OXYGEN SATURATION: 95 % | WEIGHT: 211.8 LBS | BODY MASS INDEX: 27.18 KG/M2

## 2024-06-10 DIAGNOSIS — S76.012A STRAIN OF LEFT PSOAS MUSCLE, INITIAL ENCOUNTER: ICD-10-CM

## 2024-06-10 DIAGNOSIS — E11.9 TYPE 2 DIABETES MELLITUS WITHOUT COMPLICATION, WITHOUT LONG-TERM CURRENT USE OF INSULIN (HCC): Primary | ICD-10-CM

## 2024-06-10 LAB
HBA1C MFR BLD: 7 % (ref ?–5.8)
POCT INT CON NEG: NEGATIVE
POCT INT CON POS: POSITIVE

## 2024-06-10 PROCEDURE — 83036 HEMOGLOBIN GLYCOSYLATED A1C: CPT | Performed by: FAMILY MEDICINE

## 2024-06-10 PROCEDURE — 3078F DIAST BP <80 MM HG: CPT | Performed by: FAMILY MEDICINE

## 2024-06-10 PROCEDURE — 3074F SYST BP LT 130 MM HG: CPT | Performed by: FAMILY MEDICINE

## 2024-06-10 PROCEDURE — 99214 OFFICE O/P EST MOD 30 MIN: CPT | Performed by: FAMILY MEDICINE

## 2024-06-10 NOTE — PROGRESS NOTES
RN-CDE Note    Subjective:     HPI:  José Bray is a 53 y.o. old patient who is seen by the Diabetes Nurse Specialist today for review of his type 2 diabetes.    Changes in Health: denies changes.  States occasional LLQ abdominal pain.     Diabetes Medications:   Synjardy 12.5/1000 mg bid  Pioglitazone 45 mg daily  Glimepiride 1 mg daily  Taking above medications as prescribed: yes  Taking daily ASA: Not Indicated    Exercise: active, training for [employer]  Diet: healthy most of the time, does eat sweets on occasion.   Patient's body mass index is 27.19 kg/m². Exercise and nutrition counseling were performed at this visit.      Health Maintenance:   Health Maintenance Due   Topic Date Due    HIV Screening  Never done    COVID-19 Vaccine (3 - 2023-24 season) 09/01/2023    A1c Screening  05/26/2024         DM:   Last A1c:   Lab Results   Component Value Date/Time    HBA1C 7.0 (A) 06/10/2024 03:38 PM      Previous A1c was 7.1 on 2/26/24  A1C GOAL: < 7    Glucose monitoring frequency: not testing     Hypoglycemic episodes: occasionally will feel as if he is low when going for bike ride.   Last Retinal Exam: on file and up-to-date  Daily Foot Exam: Yes      Exam:  Monofilament: current.     Lab Results   Component Value Date/Time    MICROALBCALC 3.4 11/28/2016 08:13 AM    MALBCRT see below 10/09/2023 03:17 PM    MICROALBUR <1.2 10/09/2023 03:17 PM    MICRALB 9.0 11/28/2016 08:13 AM        ACR Albumin/Creatinine Ratio goal <30     HTN:   Blood pressure goal <130/<80 .   Currently Rx ACE/ARB: Not Indicated     Dyslipidemia:    Lab Results   Component Value Date/Time    CHOLSTRLTOT 143 11/10/2023 02:16 PM    LDL 55 11/10/2023 02:16 PM    HDL 41 11/10/2023 02:16 PM    TRIGLYCERIDE 236 (H) 11/10/2023 02:16 PM         Currently Rx Statin: Yes     He  reports that he quit smoking about 22 years ago. He quit smokeless tobacco use about 13 years ago.      Plan:     Discussed and educated on:   - All medications,  side effects and compliance (discussed carefully)  - Foot Care: what to look for when checking feet every day  - HbA1C: target  - Home glucose monitoring emphasized  - Weight control and daily exercise    Recommended medication changes: no changes

## 2024-06-10 NOTE — PROGRESS NOTES
"Verbal consent was acquired by the patient to use [employer] ambient listening note generation during this visit     Subjective:     HPI:   History of Present Illness  The patient is a 53-year-old gentleman here today to follow up on diabetes. Hemoglobin A1c today is 7.0 percent. His current regimen is Synjardy 12.5/1000 mg twice a day, Actos 45 mg daily, and glimepiride 1 mg daily.    The patient intends to contact the nutritionist to get started.    The patient experiences intermittent left lower quadrant abdominal pain, which radiates into the groin. He also reports infrequent dysuria, which he suspects may be due to renal calculi. He denies any hematuria. He expresses concern about potential renal issues. He experiences pain upon palpation of the psoas tendon, which he attributes to running. He also reports issues with his gluteal muscles. He experiences pain in the groin and inner thigh when stretching in the morning. These episodes occur less than once a month, with 2 or 3 episodes this year. He also reports abdominal discomfort when taking his medications on an empty stomach, which resolves after consuming a banana, orange, or a piece of toast. He expresses concern about the long-term side effects of metformin.    The patient expresses concern about his low blood pressure. He denies experiencing dizziness or lightheadedness, although he does report occasional dizziness upon standing for a split second. He denies experiencing chest pain, respiratory distress, fevers, or chills.    Health Maintenance: Completed    Objective:     Exam:  BP 92/60 (BP Location: Right arm, Patient Position: Sitting)   Pulse 75   Temp 37.2 °C (99 °F) (Temporal)   Ht 1.88 m (6' 2\")   Wt 96.1 kg (211 lb 12.8 oz)   SpO2 95%   BMI 27.19 kg/m²  Body mass index is 27.19 kg/m².    Physical Exam  Constitutional:       Appearance: Normal appearance.   Cardiovascular:      Rate and Rhythm: Normal rate and regular rhythm.      Heart " sounds: Normal heart sounds.   Pulmonary:      Effort: Pulmonary effort is normal.      Breath sounds: Normal breath sounds.   Musculoskeletal:      Cervical back: Normal range of motion and neck supple.   Lymphadenopathy:      Cervical: No cervical adenopathy.   Neurological:      Mental Status: He is alert.             Results  Laboratory Studies  Hemoglobin A1c today is 7.0 percent.    Assessment & Plan:     1. Type 2 diabetes mellitus without complication, without long-term current use of insulin ([facility])  POCT Hemoglobin A1C      2. Strain of left psoas muscle, initial encounter            Assessment & Plan  1. Type 2 diabetes without complication.  The patient's condition is chronic and stable, as evidenced by today's hemoglobin A1c level of 7.0 percent. The current medical regimen will be maintained while the patient consults with a nutritionist. This will facilitate achieving achieving a target of 7.0 percent. The patient has reported experiencing stomach pain when taking his medications on an empty stomach, which I suspect will subside once regular intake with food. The current regimen includes glimepiride 1 mg every morning, pioglitazone 45 mg daily, and Synjardy 12.5-1000 mg twice daily.    2. Strain of left psoas muscle.  The patient has reported tenderness over the tendon of the psoas muscle on the left side. On rare occasions, the patient experiences pain in the left lower quadrant that radiates down into the groin, which I believe is related to the strain. We will continue to monitor this condition. Should the pain become more frequent, the patient will return for further evaluation.      Referral for genetic research was offered. Patient is a participant.    Return in about 3 months (around 9/10/2024) for Diabetic RN + me.    Please note that this dictation was created using voice recognition software. I have made every reasonable attempt to correct obvious errors, but I expect that there are errors of  grammar and possibly content that I did not discover before finalizing the note.

## 2024-06-12 ENCOUNTER — APPOINTMENT (RX ONLY)
Dept: URBAN - METROPOLITAN AREA CLINIC 22 | Facility: CLINIC | Age: 54
Setting detail: DERMATOLOGY
End: 2024-06-12

## 2024-06-12 DIAGNOSIS — L81.4 OTHER MELANIN HYPERPIGMENTATION: ICD-10-CM

## 2024-06-12 DIAGNOSIS — D22 MELANOCYTIC NEVI: ICD-10-CM

## 2024-06-12 DIAGNOSIS — D18.0 HEMANGIOMA: ICD-10-CM

## 2024-06-12 DIAGNOSIS — Z87.2 PERSONAL HISTORY OF DISEASES OF THE SKIN AND SUBCUTANEOUS TISSUE: ICD-10-CM

## 2024-06-12 DIAGNOSIS — Z80.8 FAMILY HISTORY OF MALIGNANT NEOPLASM OF OTHER ORGANS OR SYSTEMS: ICD-10-CM

## 2024-06-12 DIAGNOSIS — Z71.89 OTHER SPECIFIED COUNSELING: ICD-10-CM

## 2024-06-12 PROBLEM — D22.5 MELANOCYTIC NEVI OF TRUNK: Status: ACTIVE | Noted: 2024-06-12

## 2024-06-12 PROBLEM — D18.01 HEMANGIOMA OF SKIN AND SUBCUTANEOUS TISSUE: Status: ACTIVE | Noted: 2024-06-12

## 2024-06-12 PROCEDURE — ? SUNSCREEN RECOMMENDATIONS

## 2024-06-12 PROCEDURE — 99213 OFFICE O/P EST LOW 20 MIN: CPT

## 2024-06-12 PROCEDURE — ? DIAGNOSIS COMMENT

## 2024-06-12 PROCEDURE — ? COUNSELING

## 2024-06-12 ASSESSMENT — LOCATION ZONE DERM
LOCATION ZONE: TRUNK
LOCATION ZONE: ARM

## 2024-06-12 ASSESSMENT — LOCATION SIMPLE DESCRIPTION DERM
LOCATION SIMPLE: LEFT UPPER BACK
LOCATION SIMPLE: LEFT SHOULDER
LOCATION SIMPLE: ABDOMEN

## 2024-06-12 ASSESSMENT — LOCATION DETAILED DESCRIPTION DERM
LOCATION DETAILED: EPIGASTRIC SKIN
LOCATION DETAILED: LEFT SUPERIOR MEDIAL UPPER BACK
LOCATION DETAILED: LEFT ANTERIOR SHOULDER

## 2024-07-06 DIAGNOSIS — E11.9 TYPE 2 DIABETES MELLITUS WITHOUT COMPLICATION, WITHOUT LONG-TERM CURRENT USE OF INSULIN (HCC): ICD-10-CM

## 2024-07-08 RX ORDER — EMPAGLIFLOZIN AND METFORMIN HYDROCHLORIDE 12.5; 1 MG/1; MG/1
TABLET ORAL
Qty: 180 TABLET | Refills: 1 | Status: SHIPPED | OUTPATIENT
Start: 2024-07-08

## 2024-08-26 ENCOUNTER — OFFICE VISIT (OUTPATIENT)
Dept: MEDICAL GROUP | Facility: PHYSICIAN GROUP | Age: 54
End: 2024-08-26
Payer: COMMERCIAL

## 2024-08-26 VITALS
OXYGEN SATURATION: 97 % | WEIGHT: 208.4 LBS | HEIGHT: 74 IN | DIASTOLIC BLOOD PRESSURE: 62 MMHG | BODY MASS INDEX: 26.75 KG/M2 | HEART RATE: 62 BPM | TEMPERATURE: 97.2 F | SYSTOLIC BLOOD PRESSURE: 108 MMHG

## 2024-08-26 DIAGNOSIS — R10.30 LOWER ABDOMINAL PAIN: Primary | ICD-10-CM

## 2024-08-26 DIAGNOSIS — M94.0 COSTOCHONDRITIS: ICD-10-CM

## 2024-08-26 DIAGNOSIS — R19.7 DIARRHEA, UNSPECIFIED TYPE: ICD-10-CM

## 2024-08-26 DIAGNOSIS — H69.93 DYSFUNCTION OF BOTH EUSTACHIAN TUBES: ICD-10-CM

## 2024-08-26 DIAGNOSIS — M79.674 PAIN OF TOE OF RIGHT FOOT: ICD-10-CM

## 2024-08-26 PROCEDURE — 99213 OFFICE O/P EST LOW 20 MIN: CPT | Performed by: FAMILY MEDICINE

## 2024-08-26 PROCEDURE — 3074F SYST BP LT 130 MM HG: CPT | Performed by: FAMILY MEDICINE

## 2024-08-26 PROCEDURE — 3078F DIAST BP <80 MM HG: CPT | Performed by: FAMILY MEDICINE

## 2024-08-26 ASSESSMENT — PATIENT HEALTH QUESTIONNAIRE - PHQ9: CLINICAL INTERPRETATION OF PHQ2 SCORE: 0

## 2024-08-26 NOTE — PROGRESS NOTES
Verbal consent was acquired by the patient to use [employer] ambient listening note generation during this visit     Subjective:     HPI:   History of Present Illness  The patient presents for evaluation of multiple medical concerns.    He experiences abdominal discomfort, which is alleviated when he consumes food with his medication. He has been using a green juice supplement, and when he discontinues it, he experiences persistent abdominal pain. However, when he resumes the supplement, his symptoms subside. He also reports increased bowel movements, up to 3 or 4 times, when he stops taking the supplement. 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 He also questions whether stress could be a factor, as he is currently living with his in-laws due to his mother-in-law's terminal illness.    He reports intermittent burning pain on the bottom of his big toe, which occurs after running. He also experiences a rash-like, itchy sensation on his calf, but there is no visible rash. He mentions a cyst in his ankle and reports no sores on his feet. He is scheduled to participate in a 50K trail run in a few weeks and is concerned about these symptoms.    He experiences mild chest pain, which he has had for some time. He reports no breathing difficulties and did not experience any chest pain during his recent run. He also reports no fevers or chills.    He requests an ear examination due to concerns about potential wax buildup. He recently went swimming and believes water may have entered his ears, causing irritation. He typically uses ear plugs when swimming. He had tubes inserted in his ears as a child and describes his ears as being very sensitive. He primarily experiences these symptoms in his left ear.    Health Maintenance: Completed    Objective:     Exam:  /62 (BP Location: Right arm, Patient Position: Sitting, BP Cuff Size: Adult)   Pulse 62   " Temp 36.2 °C (97.2 °F) (Temporal)   Ht 1.88 m (6' 2\")   Wt 94.5 kg (208 lb 6.4 oz)   SpO2 97%   BMI 26.76 kg/m²  Body mass index is 26.76 kg/m².    Physical Exam  Constitutional:       Appearance: Normal appearance.   HENT:      Right Ear: Tympanic membrane, ear canal and external ear normal.      Left Ear: Tympanic membrane, ear canal and external ear normal.   Cardiovascular:      Rate and Rhythm: Normal rate and regular rhythm.      Heart sounds: Normal heart sounds.   Pulmonary:      Effort: Pulmonary effort is normal.      Breath sounds: Normal breath sounds.   Musculoskeletal:      Cervical back: Normal range of motion and neck supple.   Lymphadenopathy:      Cervical: No cervical adenopathy.   Neurological:      Mental Status: He is alert.             Results      Assessment & Plan:     1. Lower abdominal pain        2. Diarrhea, unspecified type        3. Pain of toe of right foot        4. Dysfunction of both eustachian tubes          Assessment & Plan  1. Abdominal pain and diarrhea.  Chronic. He has noticed after he stops his probioticfiber supplement, he gets abdominal discomfort and looser stool.  The abdominal discomfort and looser stool is likely due to a lack of dietary fiber that then is fixed with his probiotic/fiber supplement or stress-related issues. He was advised to maintain a daily intake of 32 g of fiber, which can be supplemented with Metamucil if necessary. The use of probiotics was also discussed, but it is suspected that the fiber component is more beneficial. He was advised to experiment with a fiber supplement alone to determine its effectiveness.    2. Toe pain.  Chronic. The burning toe pain may be due to nerve compression around the ankle, possibly exacerbated by his running shoes. He was recommended to experiment with different footwear or adjust the tightness of his shoelaces to alleviate the pressure on the affected nerve. He was also advised to consider replacing his " current shoes, as they may have worn down and are contributing to the issue.    3. Chest pain.  The chest discomfort is likely due to costochondritis, an inflammation of the cartilage where the rib meets the sternum, and the surrounding muscle. He can use over the counter pain medication as needed.     4. Eustachian tube dysfunction.  The ear symptoms suggest a possible fluid buildup behind the eardrum, indicative of eustachian tube dysfunction. Flonase was recommended to facilitate the opening of the canal that drains the ear into the sinus.        Referral for genetic research was offered. Patient is a participant.    Return if symptoms worsen or fail to improve.    Please note that this dictation was created using voice recognition software. I have made every reasonable attempt to correct obvious errors, but I expect that there are errors of grammar and possibly content that I did not discover before finalizing the note.

## 2024-09-16 ENCOUNTER — HOSPITAL ENCOUNTER (OUTPATIENT)
Facility: MEDICAL CENTER | Age: 54
End: 2024-09-16
Attending: FAMILY MEDICINE
Payer: COMMERCIAL

## 2024-09-16 ENCOUNTER — OFFICE VISIT (OUTPATIENT)
Dept: MEDICAL GROUP | Facility: PHYSICIAN GROUP | Age: 54
End: 2024-09-16
Payer: COMMERCIAL

## 2024-09-16 VITALS
WEIGHT: 209.8 LBS | SYSTOLIC BLOOD PRESSURE: 118 MMHG | TEMPERATURE: 97.2 F | OXYGEN SATURATION: 96 % | DIASTOLIC BLOOD PRESSURE: 64 MMHG | HEART RATE: 69 BPM | BODY MASS INDEX: 26.92 KG/M2 | HEIGHT: 74 IN

## 2024-09-16 DIAGNOSIS — E11.649 TYPE 2 DIABETES MELLITUS WITH HYPOGLYCEMIA WITHOUT COMA, WITHOUT LONG-TERM CURRENT USE OF INSULIN (HCC): ICD-10-CM

## 2024-09-16 LAB
CREAT UR-MCNC: 51.2 MG/DL
HBA1C MFR BLD: 7 % (ref ?–5.8)
MICROALBUMIN UR-MCNC: <1.2 MG/DL
MICROALBUMIN/CREAT UR: NORMAL MG/G (ref 0–30)
POCT INT CON NEG: NEGATIVE
POCT INT CON POS: POSITIVE

## 2024-09-16 PROCEDURE — 82570 ASSAY OF URINE CREATININE: CPT

## 2024-09-16 PROCEDURE — 3078F DIAST BP <80 MM HG: CPT | Performed by: FAMILY MEDICINE

## 2024-09-16 PROCEDURE — 3074F SYST BP LT 130 MM HG: CPT | Performed by: FAMILY MEDICINE

## 2024-09-16 PROCEDURE — 83036 HEMOGLOBIN GLYCOSYLATED A1C: CPT | Performed by: FAMILY MEDICINE

## 2024-09-16 PROCEDURE — 82043 UR ALBUMIN QUANTITATIVE: CPT

## 2024-09-16 PROCEDURE — 99214 OFFICE O/P EST MOD 30 MIN: CPT | Performed by: FAMILY MEDICINE

## 2024-09-16 RX ORDER — PIOGLITAZONEHYDROCHLORIDE 45 MG/1
45 TABLET ORAL DAILY
Qty: 90 TABLET | Refills: 3 | Status: SHIPPED | OUTPATIENT
Start: 2024-09-16

## 2024-09-16 RX ORDER — GLIMEPIRIDE 1 MG/1
1 TABLET ORAL EVERY MORNING
Qty: 90 TABLET | Refills: 3 | Status: SHIPPED | OUTPATIENT
Start: 2024-09-16

## 2024-09-16 NOTE — PROGRESS NOTES
"Verbal consent was acquired by the patient to use [employer] ambient listening note generation during this visit     Subjective:     HPI:   History of Present Illness  The patient presents for evaluation of diabetes.    He is currently managing his diabetes with glimepiride 1 mg every morning, pioglitazone 45 mg daily, and Synjardy 12.5-1000 mg twice a day. He maintains an active lifestyle, including swimming and biking, and has an upcoming appointment with a dietitian on 10/02/2024.    He experienced a hypoglycemic episode after a long bike ride, which he managed with sugary foods. He does not use glucose tablets but keeps peppermint mints on hand.    He recently had an eye exam and reports no chest pain, breathing difficulties, fevers, or chills in the past few days. His last colonoscopy was in 2020.    IMMUNIZATIONS  He received influenza vaccine on 09/10/2024.    Health Maintenance: Completed    Objective:     Exam:  /64 (BP Location: Right arm, Patient Position: Sitting, BP Cuff Size: Adult)   Pulse 69   Temp 36.2 °C (97.2 °F) (Temporal)   Ht 1.88 m (6' 2\")   Wt 95.2 kg (209 lb 12.8 oz)   SpO2 96%   BMI 26.94 kg/m²  Body mass index is 26.94 kg/m².    Physical Exam  Constitutional:       Appearance: Normal appearance.   Cardiovascular:      Rate and Rhythm: Normal rate and regular rhythm.      Heart sounds: Normal heart sounds.   Pulmonary:      Effort: Pulmonary effort is normal.      Breath sounds: Normal breath sounds.   Musculoskeletal:      Cervical back: Normal range of motion and neck supple.   Lymphadenopathy:      Cervical: No cervical adenopathy.   Neurological:      Mental Status: He is alert.             Results  Laboratory Studies  A1c is 7.0%.    Assessment & Plan:     1. Type 2 diabetes mellitus without complication, without long-term current use of insulin ([facility])  POCT Hemoglobin A1C    Microalbumin Creat Ratio Urine (Clinic Collect)    glimepiride (AMARYL) 1 MG tablet    "       Assessment & Plan  1. Diabetes Mellitus.  Chronic, stable. His A1c level today is 7.0%, indicating a slight improvement but still above the target of under 7%. He is currently on glimepiride 1 mg every morning, pioglitazone 45 mg daily, and Synjardy 12.5/500 mg twice a day. He reports experiencing hypoglycemia during intense workouts, particularly when he does not consume enough food before exercising. Glucose tablets will be prescribed to manage these episodes; he should take 4 g of glucose and repeat the dose after 15 minutes if his blood glucose remains below 60. He is advised to continue his current medication regimen. A urine test for microalbumin to creatinine ratio will be conducted today. He has an upcoming appointment with a nutritionist on October 2, 2024, to help manage his diet and energy levels during workouts. Refills for pioglitazone and glimepiride will be provided.    2. Hypoglycemia.  He experiences hypoglycemia during intense workouts, particularly when he does not consume enough food before exercising. Glucose tablets will be prescribed to manage these episodes; he should take 4 g of glucose and repeat the dose after 15 minutes if his blood glucose remains below 60.     3. Health Maintenance.  He had an eye exam in September 2024, which showed no signs of diabetic retinopathy. He received a flu shot on September 10, 2024. He is recommended to receive the COVID-19 booster shot. His colon cancer screening is up-to-date until 2030.        Referral for genetic research was offered. Patient is a participant.      Return in about 3 months (around 12/16/2024) for Diabetic RN + me.    Please note that this dictation was created using voice recognition software. I have made every reasonable attempt to correct obvious errors, but I expect that there are errors of grammar and possibly content that I did not discover before finalizing the note.

## 2024-09-30 SDOH — ECONOMIC STABILITY: FOOD INSECURITY: WITHIN THE PAST 12 MONTHS, YOU WORRIED THAT YOUR FOOD WOULD RUN OUT BEFORE YOU GOT MONEY TO BUY MORE.: NEVER TRUE

## 2024-09-30 SDOH — HEALTH STABILITY: PHYSICAL HEALTH: ON AVERAGE, HOW MANY DAYS PER WEEK DO YOU ENGAGE IN MODERATE TO STRENUOUS EXERCISE (LIKE A BRISK WALK)?: 6 DAYS

## 2024-09-30 SDOH — ECONOMIC STABILITY: TRANSPORTATION INSECURITY
IN THE PAST 12 MONTHS, HAS LACK OF RELIABLE TRANSPORTATION KEPT YOU FROM MEDICAL APPOINTMENTS, MEETINGS, WORK OR FROM GETTING THINGS NEEDED FOR DAILY LIVING?: NO

## 2024-09-30 SDOH — ECONOMIC STABILITY: INCOME INSECURITY: HOW HARD IS IT FOR YOU TO PAY FOR THE VERY BASICS LIKE FOOD, HOUSING, MEDICAL CARE, AND HEATING?: NOT VERY HARD

## 2024-09-30 SDOH — ECONOMIC STABILITY: TRANSPORTATION INSECURITY
IN THE PAST 12 MONTHS, HAS THE LACK OF TRANSPORTATION KEPT YOU FROM MEDICAL APPOINTMENTS OR FROM GETTING MEDICATIONS?: NO

## 2024-09-30 SDOH — HEALTH STABILITY: PHYSICAL HEALTH: ON AVERAGE, HOW MANY MINUTES DO YOU ENGAGE IN EXERCISE AT THIS LEVEL?: 80 MIN

## 2024-09-30 SDOH — ECONOMIC STABILITY: INCOME INSECURITY: IN THE LAST 12 MONTHS, WAS THERE A TIME WHEN YOU WERE NOT ABLE TO PAY THE MORTGAGE OR RENT ON TIME?: NO

## 2024-09-30 SDOH — ECONOMIC STABILITY: FOOD INSECURITY: WITHIN THE PAST 12 MONTHS, THE FOOD YOU BOUGHT JUST DIDN'T LAST AND YOU DIDN'T HAVE MONEY TO GET MORE.: NEVER TRUE

## 2024-09-30 SDOH — ECONOMIC STABILITY: TRANSPORTATION INSECURITY
IN THE PAST 12 MONTHS, HAS LACK OF TRANSPORTATION KEPT YOU FROM MEETINGS, WORK, OR FROM GETTING THINGS NEEDED FOR DAILY LIVING?: NO

## 2024-09-30 SDOH — HEALTH STABILITY: MENTAL HEALTH
STRESS IS WHEN SOMEONE FEELS TENSE, NERVOUS, ANXIOUS, OR CAN'T SLEEP AT NIGHT BECAUSE THEIR MIND IS TROUBLED. HOW STRESSED ARE YOU?: ONLY A LITTLE

## 2024-09-30 SDOH — ECONOMIC STABILITY: HOUSING INSECURITY
IN THE LAST 12 MONTHS, WAS THERE A TIME WHEN YOU DID NOT HAVE A STEADY PLACE TO SLEEP OR SLEPT IN A SHELTER (INCLUDING NOW)?: NO

## 2024-09-30 ASSESSMENT — SOCIAL DETERMINANTS OF HEALTH (SDOH)
DO YOU BELONG TO ANY CLUBS OR ORGANIZATIONS SUCH AS CHURCH GROUPS UNIONS, FRATERNAL OR ATHLETIC GROUPS, OR SCHOOL GROUPS?: YES
HOW OFTEN DO YOU GET TOGETHER WITH FRIENDS OR RELATIVES?: NEVER
IN A TYPICAL WEEK, HOW MANY TIMES DO YOU TALK ON THE PHONE WITH FAMILY, FRIENDS, OR NEIGHBORS?: TWICE A WEEK
HOW OFTEN DO YOU HAVE SIX OR MORE DRINKS ON ONE OCCASION: NEVER
HOW OFTEN DO YOU ATTEND CHURCH OR RELIGIOUS SERVICES?: MORE THAN 4 TIMES PER YEAR
HOW MANY DRINKS CONTAINING ALCOHOL DO YOU HAVE ON A TYPICAL DAY WHEN YOU ARE DRINKING: PATIENT DOES NOT DRINK
HOW OFTEN DO YOU ATTENT MEETINGS OF THE CLUB OR ORGANIZATION YOU BELONG TO?: MORE THAN 4 TIMES PER YEAR
HOW HARD IS IT FOR YOU TO PAY FOR THE VERY BASICS LIKE FOOD, HOUSING, MEDICAL CARE, AND HEATING?: NOT VERY HARD
HOW OFTEN DO YOU HAVE A DRINK CONTAINING ALCOHOL: NEVER
HOW OFTEN DO YOU ATTENT MEETINGS OF THE CLUB OR ORGANIZATION YOU BELONG TO?: MORE THAN 4 TIMES PER YEAR
IN THE PAST 12 MONTHS, HAS THE ELECTRIC, GAS, OIL, OR WATER COMPANY THREATENED TO SHUT OFF SERVICE IN YOUR HOME?: NO
IN A TYPICAL WEEK, HOW MANY TIMES DO YOU TALK ON THE PHONE WITH FAMILY, FRIENDS, OR NEIGHBORS?: TWICE A WEEK
WITHIN THE PAST 12 MONTHS, YOU WORRIED THAT YOUR FOOD WOULD RUN OUT BEFORE YOU GOT THE MONEY TO BUY MORE: NEVER TRUE
HOW OFTEN DO YOU GET TOGETHER WITH FRIENDS OR RELATIVES?: NEVER
HOW OFTEN DO YOU ATTEND CHURCH OR RELIGIOUS SERVICES?: MORE THAN 4 TIMES PER YEAR
DO YOU BELONG TO ANY CLUBS OR ORGANIZATIONS SUCH AS CHURCH GROUPS UNIONS, FRATERNAL OR ATHLETIC GROUPS, OR SCHOOL GROUPS?: YES

## 2024-09-30 ASSESSMENT — LIFESTYLE VARIABLES
SKIP TO QUESTIONS 9-10: 1
HOW MANY STANDARD DRINKS CONTAINING ALCOHOL DO YOU HAVE ON A TYPICAL DAY: PATIENT DOES NOT DRINK
HOW OFTEN DO YOU HAVE A DRINK CONTAINING ALCOHOL: NEVER
AUDIT-C TOTAL SCORE: 0
HOW OFTEN DO YOU HAVE SIX OR MORE DRINKS ON ONE OCCASION: NEVER

## 2024-10-02 ENCOUNTER — APPOINTMENT (OUTPATIENT)
Dept: INTERNAL MEDICINE | Facility: OTHER | Age: 54
End: 2024-10-02
Payer: COMMERCIAL

## 2024-10-02 VITALS — BODY MASS INDEX: 26.19 KG/M2 | WEIGHT: 204 LBS

## 2024-10-08 ENCOUNTER — TELEPHONE (OUTPATIENT)
Dept: INTERNAL MEDICINE | Facility: OTHER | Age: 54
End: 2024-10-08
Payer: COMMERCIAL

## 2024-12-31 DIAGNOSIS — E11.9 TYPE 2 DIABETES MELLITUS WITHOUT COMPLICATION, WITHOUT LONG-TERM CURRENT USE OF INSULIN (HCC): ICD-10-CM

## 2025-01-02 RX ORDER — EMPAGLIFLOZIN AND METFORMIN HYDROCHLORIDE 12.5; 1 MG/1; MG/1
TABLET ORAL
Qty: 180 TABLET | Refills: 0 | Status: SHIPPED | OUTPATIENT
Start: 2025-01-02 | End: 2025-01-13 | Stop reason: SDUPTHER

## 2025-01-13 ENCOUNTER — NON-PROVIDER VISIT (OUTPATIENT)
Dept: MEDICAL GROUP | Facility: PHYSICIAN GROUP | Age: 55
End: 2025-01-13
Payer: COMMERCIAL

## 2025-01-13 VITALS
OXYGEN SATURATION: 95 % | BODY MASS INDEX: 27.03 KG/M2 | SYSTOLIC BLOOD PRESSURE: 108 MMHG | DIASTOLIC BLOOD PRESSURE: 64 MMHG | RESPIRATION RATE: 18 BRPM | HEART RATE: 71 BPM | WEIGHT: 210.6 LBS | HEIGHT: 74 IN

## 2025-01-13 DIAGNOSIS — E11.649 TYPE 2 DIABETES MELLITUS WITH HYPOGLYCEMIA WITHOUT COMA, WITHOUT LONG-TERM CURRENT USE OF INSULIN (HCC): ICD-10-CM

## 2025-01-13 DIAGNOSIS — E11.9 TYPE 2 DIABETES MELLITUS WITHOUT COMPLICATION, WITHOUT LONG-TERM CURRENT USE OF INSULIN (HCC): ICD-10-CM

## 2025-01-13 LAB
HBA1C MFR BLD: 7.5 % (ref ?–5.8)
POCT INT CON NEG: NEGATIVE
POCT INT CON POS: POSITIVE

## 2025-01-13 PROCEDURE — 83036 HEMOGLOBIN GLYCOSYLATED A1C: CPT | Performed by: FAMILY MEDICINE

## 2025-01-13 PROCEDURE — 99211 OFF/OP EST MAY X REQ PHY/QHP: CPT | Performed by: FAMILY MEDICINE

## 2025-01-13 RX ORDER — GLIMEPIRIDE 1 MG/1
1 TABLET ORAL EVERY MORNING
Qty: 90 TABLET | Refills: 3 | Status: SHIPPED | OUTPATIENT
Start: 2025-01-13

## 2025-01-13 RX ORDER — EMPAGLIFLOZIN AND METFORMIN HYDROCHLORIDE 12.5; 1 MG/1; MG/1
1 TABLET ORAL 2 TIMES DAILY
Qty: 180 TABLET | Refills: 2 | Status: SHIPPED | OUTPATIENT
Start: 2025-01-13

## 2025-01-13 RX ORDER — PIOGLITAZONE 45 MG/1
45 TABLET ORAL DAILY
Qty: 90 TABLET | Refills: 3 | Status: SHIPPED | OUTPATIENT
Start: 2025-01-13

## 2025-01-13 NOTE — PROGRESS NOTES
RN-CDE Note    Subjective:     HPI:  José Bray is a 54 y.o. old patient who is seen by the Diabetes Nurse Specialist today for review of his type 2 diabetes.    Changes in Health: Had been on a 16 day cruise and was eating a lot of sweets.     Diabetes Medications:   Pioglitazone 45 mg daily  Synjardy 12.5/1000 mg bid  Glimepiride 1 mg daily  Taking above medications as prescribed: yes  Taking daily ASA: Not Indicated    Exercise: training for iron [employer] triathlon.   Diet: eating healthy most of the time, indulged in a lot of sweets while on vacation.   Seeing a nutritionist regarding his diet.   Patient's body mass index is 27.04 kg/m². Exercise and nutrition counseling were performed at this visit.      Health Maintenance:   Health Maintenance Due   Topic Date Due    HIV Screening  Never done    COVID-19 Vaccine (3 - 2024-25 season) 09/01/2024    Diabetes: Monofilament / LE Exam  10/09/2024    Fasting Lipid Profile  11/10/2024    SERUM CREATININE  11/10/2024    A1c Screening  12/16/2024         DM:   Last A1c:   Lab Results   Component Value Date/Time    HBA1C 7.5 (A) 01/13/2025 02:54 PM      Previous A1c was 7 on 9/16/2024  A1C GOAL: < 7    Glucose monitoring frequency: has not been testing.     Hypoglycemic episodes: no    Last Retinal Exam: on file and up-to-date  Daily Foot Exam: Yes  denies problems    Exam:  Monofilament: current.     Lab Results   Component Value Date/Time    MICROALBCALC 3.4 11/28/2016 08:13 AM    MALBCRT see below 09/16/2024 04:20 PM    MICROALBUR <1.2 09/16/2024 04:20 PM    MICRALB 9.0 11/28/2016 08:13 AM        ACR Albumin/Creatinine Ratio goal <30     HTN:   Blood pressure goal <130/<80 .   Currently Rx ACE/ARB: No     Dyslipidemia:    Lab Results   Component Value Date/Time    CHOLSTRLTOT 143 11/10/2023 02:16 PM    LDL 55 11/10/2023 02:16 PM    HDL 41 11/10/2023 02:16 PM    TRIGLYCERIDE 236 (H) 11/10/2023 02:16 PM         Currently Rx Statin:  Rosuvastatin 10 mg  daily    He  reports that he quit smoking about 23 years ago. His smoking use included cigarettes. He quit smokeless tobacco use about 13 years ago.      Plan:     Discussed and educated on:   - All medications, side effects and compliance (discussed carefully)  - Annual eye examinations at Ophthalmology  - Foot Care: what to look for when checking feet every day  - HbA1C: target  - Home glucose monitoring emphasized  - Weight control and daily exercise    Recommended medication changes: No changes at this time, focus on diet.   Follow up 3 months.

## 2025-02-04 ENCOUNTER — HOSPITAL ENCOUNTER (OUTPATIENT)
Dept: LAB | Facility: MEDICAL CENTER | Age: 55
End: 2025-02-04
Attending: FAMILY MEDICINE
Payer: COMMERCIAL

## 2025-02-04 DIAGNOSIS — E11.9 TYPE 2 DIABETES MELLITUS WITHOUT COMPLICATION, WITHOUT LONG-TERM CURRENT USE OF INSULIN (HCC): ICD-10-CM

## 2025-02-04 LAB
CREAT UR-MCNC: 85.79 MG/DL
MICROALBUMIN UR-MCNC: <1.2 MG/DL
MICROALBUMIN/CREAT UR: NORMAL MG/G (ref 0–30)

## 2025-02-04 PROCEDURE — 80061 LIPID PANEL: CPT

## 2025-02-04 PROCEDURE — 36415 COLL VENOUS BLD VENIPUNCTURE: CPT

## 2025-02-04 PROCEDURE — 82570 ASSAY OF URINE CREATININE: CPT

## 2025-02-04 PROCEDURE — 82043 UR ALBUMIN QUANTITATIVE: CPT

## 2025-02-04 PROCEDURE — 80053 COMPREHEN METABOLIC PANEL: CPT

## 2025-02-05 LAB
ALBUMIN SERPL BCP-MCNC: 4.4 G/DL (ref 3.2–4.9)
ALBUMIN/GLOB SERPL: 1.6 G/DL
ALP SERPL-CCNC: 69 U/L (ref 30–99)
ALT SERPL-CCNC: 27 U/L (ref 2–50)
ANION GAP SERPL CALC-SCNC: 12 MMOL/L (ref 7–16)
AST SERPL-CCNC: 30 U/L (ref 12–45)
BILIRUB SERPL-MCNC: 0.6 MG/DL (ref 0.1–1.5)
BUN SERPL-MCNC: 22 MG/DL (ref 8–22)
CALCIUM ALBUM COR SERPL-MCNC: 9.3 MG/DL (ref 8.5–10.5)
CALCIUM SERPL-MCNC: 9.6 MG/DL (ref 8.5–10.5)
CHLORIDE SERPL-SCNC: 105 MMOL/L (ref 96–112)
CHOLEST SERPL-MCNC: 151 MG/DL (ref 100–199)
CO2 SERPL-SCNC: 23 MMOL/L (ref 20–33)
CREAT SERPL-MCNC: 1.18 MG/DL (ref 0.5–1.4)
FASTING STATUS PATIENT QL REPORTED: NORMAL
GFR SERPLBLD CREATININE-BSD FMLA CKD-EPI: 73 ML/MIN/1.73 M 2
GLOBULIN SER CALC-MCNC: 2.7 G/DL (ref 1.9–3.5)
GLUCOSE SERPL-MCNC: 125 MG/DL (ref 65–99)
HDLC SERPL-MCNC: 47 MG/DL
LDLC SERPL CALC-MCNC: 87 MG/DL
POTASSIUM SERPL-SCNC: 4.4 MMOL/L (ref 3.6–5.5)
PROT SERPL-MCNC: 7.1 G/DL (ref 6–8.2)
SODIUM SERPL-SCNC: 140 MMOL/L (ref 135–145)
TRIGL SERPL-MCNC: 87 MG/DL (ref 0–149)

## 2025-03-03 ENCOUNTER — APPOINTMENT (OUTPATIENT)
Dept: INTERNAL MEDICINE | Facility: OTHER | Age: 55
End: 2025-03-03
Payer: COMMERCIAL

## 2025-03-30 DIAGNOSIS — E78.2 MIXED HYPERLIPIDEMIA: ICD-10-CM

## 2025-03-31 RX ORDER — ROSUVASTATIN CALCIUM 10 MG/1
10 TABLET, COATED ORAL EVERY EVENING
Qty: 90 TABLET | Refills: 3 | Status: SHIPPED | OUTPATIENT
Start: 2025-03-31

## 2025-03-31 NOTE — TELEPHONE ENCOUNTER
Received request via: Patient    Was the patient seen in the last year in this department? Yes    Does the patient have an active prescription (recently filled or refills available) for medication(s) requested? No    [facility]    Does the patient have [facility] Plus and need 100-day supply? (This applies to ALL medications) Patient does not have SCP

## 2025-04-21 ENCOUNTER — OFFICE VISIT (OUTPATIENT)
Dept: MEDICAL GROUP | Facility: PHYSICIAN GROUP | Age: 55
End: 2025-04-21
Payer: COMMERCIAL

## 2025-04-21 VITALS
SYSTOLIC BLOOD PRESSURE: 98 MMHG | OXYGEN SATURATION: 94 % | WEIGHT: 211.6 LBS | BODY MASS INDEX: 27.16 KG/M2 | DIASTOLIC BLOOD PRESSURE: 68 MMHG | HEART RATE: 73 BPM | HEIGHT: 74 IN

## 2025-04-21 DIAGNOSIS — E11.9 TYPE 2 DIABETES MELLITUS WITHOUT COMPLICATION, WITHOUT LONG-TERM CURRENT USE OF INSULIN (HCC): ICD-10-CM

## 2025-04-21 PROBLEM — Z71.84 TRAVEL ADVICE ENCOUNTER: Status: RESOLVED | Noted: 2023-04-20 | Resolved: 2025-04-21

## 2025-04-21 PROBLEM — M94.0 COSTOCHONDRITIS: Status: ACTIVE | Noted: 2022-05-06

## 2025-04-21 PROBLEM — F17.221 CHEWING TOBACCO NICOTINE DEPENDENCE IN REMISSION: Status: ACTIVE | Noted: 2025-04-21

## 2025-04-21 PROBLEM — F17.201 NICOTINE DEPENDENCE IN REMISSION: Status: ACTIVE | Noted: 2025-04-21

## 2025-04-21 LAB
HBA1C MFR BLD: 7.3 % (ref ?–5.8)
POCT INT CON NEG: NEGATIVE
POCT INT CON POS: POSITIVE

## 2025-04-21 PROCEDURE — 83036 HEMOGLOBIN GLYCOSYLATED A1C: CPT | Performed by: FAMILY MEDICINE

## 2025-04-21 PROCEDURE — 99214 OFFICE O/P EST MOD 30 MIN: CPT | Performed by: FAMILY MEDICINE

## 2025-04-21 PROCEDURE — 3078F DIAST BP <80 MM HG: CPT | Performed by: FAMILY MEDICINE

## 2025-04-21 PROCEDURE — 3074F SYST BP LT 130 MM HG: CPT | Performed by: FAMILY MEDICINE

## 2025-04-21 NOTE — PROGRESS NOTES
Verbal consent was acquired by the patient to use [employer] ambient listening note generation during this visit     Subjective:     HPI:   History of Present Illness  The patient presents for evaluation of diabetes mellitus, costochondritis, and health maintenance.    Diabetes Management  - The chief complaint is diabetes management.  - He has been contemplating the use of a continuous glucose monitor, inspired by a friend from his triathlon club who utilizes it.  - Despite maintaining a food journal, dietary intake has not been documented in recent days.  - A penchant for sweets is acknowledged, often consuming them pre or during workouts.  - His diet includes snacks such as oatmeal raisin cookies, Aussie Bites, Key lime pie, chocolate shortbread cookies, and chocolate cake.  - Advice is sought on suitable snacks for consumption before bedtime or after two daily workouts.  - Current medications include glimepiride 1 mg every morning, Synjardy 12.5-1000 mg twice a day, and pioglitazone 45 mg every day.  - An improvement in A1c from 7.5% in 01/2025 to 7.3% is noted.    Costochondritis  - Intermittent sharp pain between the ribs is reported, attributed to costochondritis.  - The pain is localized next to the sternum and can be alleviated by applying pressure.  - Muscle irritation is suspected.  - No fevers or chills have been experienced.  - Tramadol, previously prescribed for gum surgery, is not currently in use.    Health Maintenance  - Concerns about the need for prostate cancer screening are expressed, given his age.  - A colonoscopy performed a few years ago yielded normal results, with advice to repeat the procedure in 10 years.  - A history of gum recession, surgically corrected in 01/2025, is noted.  - Tobacco cessation includes quitting chewing tobacco in 01/2025 and smoking in 2001.  - Dental visits occur twice a year, and annual ENT evaluations are conducted by Dr. Zurita.  - Three teeth have been lost in  "the last year.    Supplemental information: PAST SURGICAL HISTORY:  - Gum recession surgery: 01/2025    SOCIAL HISTORY  The patient quit chewing tobacco in 2011 and smoking in 2001.    FAMILY HISTORY  The patient's mother has had polyps in the past.    Health Maintenance: Completed    Objective:     Exam:  BP 98/68 (BP Location: Right arm, Patient Position: Sitting)   Pulse 73   Ht 1.88 m (6' 2\")   Wt 96 kg (211 lb 9.6 oz)   SpO2 94%   BMI 27.17 kg/m²  Body mass index is 27.17 kg/m².    Physical Exam  Constitutional:       Appearance: Normal appearance.   Cardiovascular:      Rate and Rhythm: Normal rate and regular rhythm.      Heart sounds: Normal heart sounds.   Pulmonary:      Effort: Pulmonary effort is normal.      Breath sounds: Normal breath sounds.   Musculoskeletal:      Cervical back: Normal range of motion and neck supple.   Lymphadenopathy:      Cervical: No cervical adenopathy.   Neurological:      Mental Status: He is alert.             Results  Labs   - A1c: 7.3%    Assessment & Plan:     1. Type 2 diabetes mellitus without complication, without long-term current use of insulin ([facility])  POCT Hemoglobin A1C          Assessment & Plan  1. Diabetes mellitus: Chronic, uncontrolled. A1c 7.3% (previously 7.5% in 01/2025).  - Continue glimepiride 1 mg every morning, Synjardy 12.5-1000 mg twice a day, and pioglitazone 45 mg every day.  - Consider using the [employer] continuous glucose monitor to better understand the impact of certain foods on blood sugar levels.  - Consult with Stefani registered dietician, regarding appropriate snacks to fuel the body for the level of exercise.  - If A1c levels continue to struggle, consider adding Januvia to the regimen.    2. Costochondritis: Chronic, stable.   - Apply ice or heat, massage the area, and take ibuprofen as needed for pain management.  - Symptoms may be exacerbated by prolonged bike rides and training.    3. Health maintenance:   - All cancer screenings are " up to date.  - Discussed potential benefits and drawbacks of PSA testing for prostate cancer screening. Decided not to pursue at this time.  - Colon cancer screening is current, with the last colonoscopy showing no issues, clear for another 10 years.      Referral for genetic research was offered. Patient is a participant.    Return in about 3 months (around 7/21/2025) for f/u DM, get A1c.    Please note that this dictation was created using voice recognition software. I have made every reasonable attempt to correct obvious errors, but I expect that there are errors of grammar and possibly content that I did not discover before finalizing the note.

## 2025-04-21 NOTE — PROGRESS NOTES
"RN-CDE Note    Subjective:     HPI:  José Bray is a 54 y.o. old patient who is seen by the Diabetes Nurse Specialist today for review off his type 2 diabetes.    Changes in Health:  denies any changes.     Diabetes Medications:   Glimepiride 1 mg daily  Pioglitazone 45 mg daily  Synjardy 12.5/1000 mg bid    Exercise: very active  Diet: \"healthy\" diet  in general  Patient's body mass index is 27.17 kg/m². Exercise and nutrition counseling were performed at this visit.      Health Maintenance:   Health Maintenance Due   Topic Date Due    HIV Screening  Never done    COVID-19 Vaccine (3 - 2024-25 season) 09/01/2024    A1c Screening  04/13/2025         DM:   Last A1c:   Lab Results   Component Value Date/Time    HBA1C 7.3 (A) 04/21/2025 02:38 PM      Previous A1c was 7.5 on 1/13/2025  A1C GOAL: < 7    Glucose monitoring frequency: not testing.  Interested in [employer] CGM    Hypoglycemic episodes: no    Last Retinal Exam: on file and up-to-date       Exam:  Monofilament: current.     Lab Results   Component Value Date/Time    MICROALBCALC 3.4 11/28/2016 08:13 AM    MALBCRT see below 02/04/2025 08:22 AM    MICROALBUR <1.2 02/04/2025 08:22 AM    MICRALB 9.0 11/28/2016 08:13 AM        ACR Albumin/Creatinine Ratio goal <30     HTN:   Blood pressure goal <130/<80 .   Currently Rx ACE/ARB:  no    Dyslipidemia:    Lab Results   Component Value Date/Time    CHOLSTRLTOT 151 02/04/2025 08:22 AM    LDL 87 02/04/2025 08:22 AM    HDL 47 02/04/2025 08:22 AM    TRIGLYCERIDE 87 02/04/2025 08:22 AM         Currently Rx Statin:  no    He  reports that he quit smoking about 23 years ago. His smoking use included cigarettes. He quit smokeless tobacco use about 14 years ago.      Plan:     Discussed and educated on:   - All medications, side effects and compliance (discussed carefully)  - HbA1C: target  - Home glucose monitoring emphasized  - Weight control and daily exercise    Recommended medication changes: none    "

## 2025-05-20 DIAGNOSIS — D68.51 FACTOR V LEIDEN (HCC): ICD-10-CM

## 2025-05-20 NOTE — TELEPHONE ENCOUNTER
Received request via: Pharmacy    Was the patient seen in the last year in this department? Yes    Does the patient have an active prescription (recently filled or refills available) for medication(s) requested? No    [facility]    Does the patient have [facility] Plus and need 100-day supply? (This applies to ALL medications) Patient does not have SCP

## 2025-05-21 RX ORDER — RIVAROXABAN 20 MG/1
20 TABLET, FILM COATED ORAL
Qty: 90 TABLET | Refills: 3 | Status: SHIPPED | OUTPATIENT
Start: 2025-05-21

## 2025-06-12 ENCOUNTER — APPOINTMENT (OUTPATIENT)
Dept: URBAN - METROPOLITAN AREA CLINIC 22 | Facility: CLINIC | Age: 55
Setting detail: DERMATOLOGY
End: 2025-06-12

## 2025-06-12 DIAGNOSIS — Z87.2 PERSONAL HISTORY OF DISEASES OF THE SKIN AND SUBCUTANEOUS TISSUE: ICD-10-CM

## 2025-06-12 DIAGNOSIS — D18.0 HEMANGIOMA: ICD-10-CM

## 2025-06-12 DIAGNOSIS — Z80.8 FAMILY HISTORY OF MALIGNANT NEOPLASM OF OTHER ORGANS OR SYSTEMS: ICD-10-CM

## 2025-06-12 DIAGNOSIS — D22 MELANOCYTIC NEVI: ICD-10-CM

## 2025-06-12 DIAGNOSIS — L81.4 OTHER MELANIN HYPERPIGMENTATION: ICD-10-CM

## 2025-06-12 DIAGNOSIS — Z71.89 OTHER SPECIFIED COUNSELING: ICD-10-CM

## 2025-06-12 PROBLEM — D22.5 MELANOCYTIC NEVI OF TRUNK: Status: ACTIVE | Noted: 2025-06-12

## 2025-06-12 PROBLEM — D18.01 HEMANGIOMA OF SKIN AND SUBCUTANEOUS TISSUE: Status: ACTIVE | Noted: 2025-06-12

## 2025-06-12 PROCEDURE — ? COUNSELING

## 2025-06-12 PROCEDURE — ? SUNSCREEN RECOMMENDATIONS

## 2025-06-12 PROCEDURE — ? DIAGNOSIS COMMENT

## 2025-06-12 ASSESSMENT — LOCATION ZONE DERM
LOCATION ZONE: TRUNK
LOCATION ZONE: ARM

## 2025-06-12 ASSESSMENT — LOCATION SIMPLE DESCRIPTION DERM
LOCATION SIMPLE: LEFT SHOULDER
LOCATION SIMPLE: LEFT UPPER BACK
LOCATION SIMPLE: ABDOMEN

## 2025-06-12 ASSESSMENT — LOCATION DETAILED DESCRIPTION DERM
LOCATION DETAILED: LEFT ANTERIOR SHOULDER
LOCATION DETAILED: LEFT SUPERIOR MEDIAL UPPER BACK
LOCATION DETAILED: EPIGASTRIC SKIN

## 2025-07-23 ENCOUNTER — OFFICE VISIT (OUTPATIENT)
Dept: MEDICAL GROUP | Facility: PHYSICIAN GROUP | Age: 55
End: 2025-07-23
Payer: COMMERCIAL

## 2025-07-23 VITALS
WEIGHT: 207.2 LBS | BODY MASS INDEX: 26.59 KG/M2 | HEIGHT: 74 IN | HEART RATE: 65 BPM | DIASTOLIC BLOOD PRESSURE: 66 MMHG | SYSTOLIC BLOOD PRESSURE: 108 MMHG | OXYGEN SATURATION: 94 % | TEMPERATURE: 98.3 F

## 2025-07-23 DIAGNOSIS — R25.2 MUSCLE CRAMPS: ICD-10-CM

## 2025-07-23 DIAGNOSIS — M79.671 RIGHT FOOT PAIN: ICD-10-CM

## 2025-07-23 DIAGNOSIS — E11.9 TYPE 2 DIABETES MELLITUS WITHOUT COMPLICATION, WITHOUT LONG-TERM CURRENT USE OF INSULIN (HCC): Primary | ICD-10-CM

## 2025-07-23 DIAGNOSIS — Z11.3 SCREENING EXAMINATION FOR SEXUALLY TRANSMITTED DISEASE: ICD-10-CM

## 2025-07-23 DIAGNOSIS — R22.31 NODULE OF RIGHT PALM: ICD-10-CM

## 2025-07-23 PROBLEM — M54.9 BACK PAIN: Status: RESOLVED | Noted: 2019-10-01 | Resolved: 2025-07-23

## 2025-07-23 PROCEDURE — 3074F SYST BP LT 130 MM HG: CPT | Performed by: FAMILY MEDICINE

## 2025-07-23 PROCEDURE — 3078F DIAST BP <80 MM HG: CPT | Performed by: FAMILY MEDICINE

## 2025-07-23 PROCEDURE — 99214 OFFICE O/P EST MOD 30 MIN: CPT | Performed by: FAMILY MEDICINE

## 2025-07-23 ASSESSMENT — PATIENT HEALTH QUESTIONNAIRE - PHQ9: CLINICAL INTERPRETATION OF PHQ2 SCORE: 0

## 2025-07-23 NOTE — PROGRESS NOTES
Verbal consent was acquired by the patient to use [employer] ambient listening note generation during this visit     Subjective:     HPI:   History of Present Illness  The patient presents for evaluation of type 2 diabetes, muscle cramps, right foot pain, and nodules on the right palm.    Type 2 Diabetes  - He has been managing his diabetes with a continuous glucose monitor, which has helped him identify certain foods that elevate his blood sugar levels.  - His target blood sugar range is between 70 and 180, with an average of 158 over the past 30 days.  - He has noticed that even small portions of white rice can cause his blood sugar to spike above 200.  - He has not yet tried brown rice.  - He consumes protein and popcorn, which do not significantly affect his blood sugar.  - He has observed that strenuous activities like hiking can keep his blood sugar above 200 for extended periods, while swimming tends to lower it immediately.  - He is curious about how his upcoming half [employer] event will impact his blood sugar levels.  - He is making efforts to consume carrots, protein, and fiber before carbohydrates to help manage his blood sugar.  - He is currently taking Synjardy twice daily, glimepiride once every morning, and pioglitazone once daily.    Muscle Cramps  - He reports experiencing leg soreness and cramping, which he attributes to his training for a half [employer] event.  - He is considering increasing his magnesium intake or taking salt tablets during workouts to alleviate the cramps.  - He has previously used pickle juice as a remedy.    Right Foot Pain  - He has been experiencing pain on the lateral edge of his right foot since he started participating in [employer] events.  - He does not believe it is a stress fracture as he can still run on it.  - The pain, described as a hot flash or electric shock, occurs when he steps on uneven surfaces and persists with each step until it subsides.  - This issue is  "exclusive to his right foot.  - He recently ran with a new insole and did not experience any pain.    Nodules on Right Palm  - He has noticed cysts on his right palm, which have been present for about a year.  - Initially, there was only one cyst, but now there are more.  - One of the cysts is painful.  - He has had similar cysts in his arms in the past, which have since disappeared.    He had an HIV test during his first divorce due to infidelity but has not had one since then. He is open to repeating the test if recommended. He reports no chest pain, breathing difficulties, fevers, or chills.    Social History:  Hobbies: Hiking, [employer] events  Diet: Consumes protein, popcorn, carrots, and fiber before carbohydrates    FAMILY HISTORY  - Father had a severe heart attack and a stroke    Health Maintenance: Completed    Objective:     Exam:  /66 (BP Location: Left arm, Patient Position: Sitting, BP Cuff Size: Adult)   Pulse 65   Temp 36.8 °C (98.3 °F) (Temporal)   Ht 1.88 m (6' 2\")   Wt 94 kg (207 lb 3.2 oz)   SpO2 94%   BMI 26.60 kg/m²  Body mass index is 26.6 kg/m².    Physical Exam  Constitutional:       Appearance: Normal appearance.   Cardiovascular:      Rate and Rhythm: Normal rate and regular rhythm.      Heart sounds: Normal heart sounds.   Pulmonary:      Effort: Pulmonary effort is normal.      Breath sounds: Normal breath sounds.   Musculoskeletal:      Cervical back: Normal range of motion and neck supple.   Lymphadenopathy:      Cervical: No cervical adenopathy.   Neurological:      Mental Status: He is alert.           Results  Labs   - A1c: 7.4%    Assessment & Plan:     1. Type 2 diabetes mellitus without complication, without long-term current use of insulin ([facility])  CBC WITH DIFFERENTIAL    HEMOGLOBIN A1C      2. Muscle cramps        3. Right foot pain        4. Nodule of right palm        5. Screening examination for sexually transmitted disease  HIV AG/AB COMBO ASSAY SCREENING    "       Assessment & Plan  1. Type 2 diabetes without complication: Chronic and status unknown. Last A1c was 7.4%. A1c machine is broken today, so it could not be checked in the office.  - Diagnostic plan: Labs have been ordered to check it with an outpatient lab, and his medication regimen will be evaluated after the results are available.  - Treatment plan: Continue Synjardy 12.5-1000 mg twice daily, glimepiride 1 mg every morning, and pioglitazone 45 mg daily.    2. Muscle cramps: Chronic and stable. He experiences severe muscle cramps in his legs. Etiology is not currently known, making treatment difficult.  - Treatment plan: Remedies discussed include pickle juice, electrolytes, staying well hydrated, tonic water with quinine, Tums, and vitamin K. No new medications or therapies prescribed at this time.    3. Right foot pain: Chronic and stable for 5 years since he started running triathlons. Intermittent pain on the lateral edge of his right foot, noticeable only when running on an uneven surface. Recently ran with a new insole without any pain.  - Treatment plan: It will be monitored to see if this insole resolves the pain. If pain persists, a referral to sports medicine will be placed.    4. Nodules of the right palm: Chronic and progressing. He has had them for about a year. Initially, there was one nodule, but now there are two or three nodules in the palm, which may be somewhat painful. They could be a ganglion cyst or the beginning of Dupuytren's contracture. If it is the latter, no treatment is needed at this time.  - Treatment plan: These will be monitored for now.    5. Healthcare maintenance: Due for his once-in-a-lifetime HIV screen, which was ordered today.        Referral for genetic research was offered. Patient is a participant.      Return in about 3 months (around 10/23/2025) for f/u DM, get A1c.    Please note that this dictation was created using voice recognition software. I have made every  reasonable attempt to correct obvious errors, but I expect that there are errors of grammar and possibly content that I did not discover before finalizing the note.

## 2025-08-04 ENCOUNTER — APPOINTMENT (OUTPATIENT)
Dept: LAB | Facility: MEDICAL CENTER | Age: 55
End: 2025-08-04
Payer: COMMERCIAL

## 2025-08-05 ENCOUNTER — RESULTS FOLLOW-UP (OUTPATIENT)
Dept: MEDICAL GROUP | Facility: PHYSICIAN GROUP | Age: 55
End: 2025-08-05
Payer: COMMERCIAL